# Patient Record
Sex: MALE | Race: WHITE | NOT HISPANIC OR LATINO | URBAN - METROPOLITAN AREA
[De-identification: names, ages, dates, MRNs, and addresses within clinical notes are randomized per-mention and may not be internally consistent; named-entity substitution may affect disease eponyms.]

---

## 2023-01-29 PROBLEM — E11.69 HYPERLIPIDEMIA ASSOCIATED WITH TYPE 2 DIABETES MELLITUS (MULTI): Status: ACTIVE | Noted: 2023-01-29

## 2023-01-29 PROBLEM — I50.30 DIASTOLIC CHF (MULTI): Status: ACTIVE | Noted: 2023-01-29

## 2023-01-29 PROBLEM — J02.9 PHARYNGITIS: Status: ACTIVE | Noted: 2023-01-29

## 2023-01-29 PROBLEM — R05.9 COUGH: Status: ACTIVE | Noted: 2023-01-29

## 2023-01-29 PROBLEM — J45.909 ASTHMA (HHS-HCC): Status: ACTIVE | Noted: 2023-01-29

## 2023-01-29 PROBLEM — K52.9 COLITIS: Status: ACTIVE | Noted: 2023-01-29

## 2023-01-29 PROBLEM — R10.9 UNSPECIFIED ABDOMINAL PAIN: Status: ACTIVE | Noted: 2023-01-29

## 2023-01-29 PROBLEM — I10 HYPERTENSION: Status: ACTIVE | Noted: 2023-01-29

## 2023-01-29 PROBLEM — Z95.5 PRESENCE OF STENT IN CORONARY ARTERY: Status: ACTIVE | Noted: 2023-01-29

## 2023-01-29 PROBLEM — D50.8: Status: ACTIVE | Noted: 2023-01-29

## 2023-01-29 PROBLEM — E78.00 PURE HYPERCHOLESTEROLEMIA: Status: ACTIVE | Noted: 2023-01-29

## 2023-01-29 PROBLEM — I31.39 PERICARDIAL EFFUSION (HHS-HCC): Status: ACTIVE | Noted: 2023-01-29

## 2023-01-29 PROBLEM — F41.9 ANXIETY DISORDER: Status: ACTIVE | Noted: 2023-01-29

## 2023-01-29 PROBLEM — I25.10 ATHSCL HEART DISEASE OF NATIVE CORONARY ARTERY W/O ANG PCTRS: Status: ACTIVE | Noted: 2023-01-29

## 2023-01-29 PROBLEM — R09.1 PLEURISY: Status: ACTIVE | Noted: 2023-01-29

## 2023-01-29 PROBLEM — I31.9 PERICARDITIS (HHS-HCC): Status: ACTIVE | Noted: 2023-01-29

## 2023-01-29 PROBLEM — E78.5 HYPERLIPIDEMIA ASSOCIATED WITH TYPE 2 DIABETES MELLITUS (MULTI): Status: ACTIVE | Noted: 2023-01-29

## 2023-01-29 PROBLEM — E11.9 DIABETES (MULTI): Status: ACTIVE | Noted: 2023-01-29

## 2023-01-29 PROBLEM — J31.0 RHINITIS: Status: ACTIVE | Noted: 2023-01-29

## 2023-01-29 PROBLEM — I35.0 AS (AORTIC STENOSIS): Status: ACTIVE | Noted: 2023-01-29

## 2023-01-29 PROBLEM — E03.9 HYPOTHYROID: Status: ACTIVE | Noted: 2023-01-29

## 2023-01-29 PROBLEM — I73.9 PVD (PERIPHERAL VASCULAR DISEASE) (CMS-HCC): Status: ACTIVE | Noted: 2023-01-29

## 2023-01-29 RX ORDER — ATORVASTATIN CALCIUM 40 MG/1
1 TABLET, FILM COATED ORAL DAILY
COMMUNITY
Start: 2018-09-19 | End: 2023-04-26 | Stop reason: ALTCHOICE

## 2023-01-29 RX ORDER — LYSINE HCL 500 MG
TABLET ORAL
COMMUNITY
Start: 2019-12-02

## 2023-01-29 RX ORDER — AMLODIPINE BESYLATE 10 MG/1
1 TABLET ORAL DAILY
COMMUNITY
Start: 2018-09-19 | End: 2023-04-26 | Stop reason: SDUPTHER

## 2023-01-29 RX ORDER — HYDROCHLOROTHIAZIDE 25 MG/1
1 TABLET ORAL DAILY
COMMUNITY
Start: 2022-02-04 | End: 2023-04-26 | Stop reason: SDUPTHER

## 2023-01-29 RX ORDER — POTASSIUM CHLORIDE 1500 MG/1
TABLET, EXTENDED RELEASE ORAL
COMMUNITY

## 2023-01-29 RX ORDER — LOSARTAN POTASSIUM 50 MG/1
1 TABLET ORAL DAILY
COMMUNITY
Start: 2022-02-04

## 2023-01-29 RX ORDER — PSYLLIUM HUSK 3.4 G/12G
POWDER ORAL
COMMUNITY
Start: 2019-12-02 | End: 2023-10-09 | Stop reason: WASHOUT

## 2023-01-29 RX ORDER — NICOTINE POLACRILEX 2 MG
GUM BUCCAL
COMMUNITY
Start: 2019-12-02

## 2023-01-29 RX ORDER — MV-MN/OM3/DHA/EPA/FISH/LUT/ZEA 250-5-1 MG
CAPSULE ORAL
COMMUNITY
Start: 2019-12-02 | End: 2023-10-09 | Stop reason: WASHOUT

## 2023-01-29 RX ORDER — FAMOTIDINE 40 MG/1
1 TABLET, FILM COATED ORAL 2 TIMES DAILY
COMMUNITY
End: 2023-04-12

## 2023-01-29 RX ORDER — FINASTERIDE 5 MG/1
1 TABLET, FILM COATED ORAL DAILY
COMMUNITY
Start: 2020-10-13 | End: 2023-09-13 | Stop reason: SDUPTHER

## 2023-01-29 RX ORDER — GLUCOSAMINE/D3/BOSWELLIA SERRA 1500MG-400
TABLET ORAL
COMMUNITY
Start: 2019-12-02 | End: 2023-10-09 | Stop reason: WASHOUT

## 2023-01-29 RX ORDER — LEVOTHYROXINE SODIUM 50 UG/1
TABLET ORAL
COMMUNITY
End: 2023-10-09 | Stop reason: WASHOUT

## 2023-01-29 RX ORDER — COLCHICINE 0.6 MG/1
0.6 TABLET ORAL 2 TIMES DAILY
COMMUNITY
Start: 2022-09-16 | End: 2024-04-11 | Stop reason: WASHOUT

## 2023-01-29 RX ORDER — ASPIRIN 81 MG/1
1 TABLET ORAL DAILY
COMMUNITY

## 2023-01-29 RX ORDER — METOPROLOL TARTRATE 25 MG/1
1 TABLET, FILM COATED ORAL 2 TIMES DAILY
COMMUNITY
Start: 2013-12-19

## 2023-01-29 RX ORDER — PNV NO.95/FERROUS FUM/FOLIC AC 28MG-0.8MG
TABLET ORAL
COMMUNITY
Start: 2019-12-02

## 2023-01-29 RX ORDER — NITROGLYCERIN 0.4 MG/1
1 TABLET SUBLINGUAL
COMMUNITY
End: 2023-10-09 | Stop reason: SDUPTHER

## 2023-01-29 RX ORDER — ACETAMINOPHEN 500 MG
TABLET ORAL
COMMUNITY
Start: 2019-12-02 | End: 2023-10-09 | Stop reason: WASHOUT

## 2023-01-29 RX ORDER — DIPHENOXYLATE HYDROCHLORIDE AND ATROPINE SULFATE 2.5; .025 MG/1; MG/1
1 TABLET ORAL DAILY
COMMUNITY
Start: 2021-04-22 | End: 2023-03-13 | Stop reason: SDUPTHER

## 2023-01-29 RX ORDER — L.ACID,FERM,PLA,RHA/B.BIF,LONG 126 MG
TABLET, DELAYED AND EXTENDED RELEASE ORAL
COMMUNITY
Start: 2019-12-02 | End: 2023-10-09 | Stop reason: WASHOUT

## 2023-01-29 RX ORDER — GLIMEPIRIDE 1 MG/1
TABLET ORAL
COMMUNITY
Start: 2013-11-22

## 2023-03-13 ENCOUNTER — APPOINTMENT (OUTPATIENT)
Dept: LAB | Facility: LAB | Age: 83
End: 2023-03-13
Payer: MEDICARE

## 2023-03-13 ENCOUNTER — OFFICE VISIT (OUTPATIENT)
Dept: PRIMARY CARE | Facility: CLINIC | Age: 83
End: 2023-03-13
Payer: MEDICARE

## 2023-03-13 VITALS
DIASTOLIC BLOOD PRESSURE: 66 MMHG | HEART RATE: 74 BPM | BODY MASS INDEX: 29.4 KG/M2 | WEIGHT: 194 LBS | HEIGHT: 68 IN | SYSTOLIC BLOOD PRESSURE: 118 MMHG | OXYGEN SATURATION: 98 %

## 2023-03-13 DIAGNOSIS — I31.39 PERICARDIAL EFFUSION (HHS-HCC): Primary | ICD-10-CM

## 2023-03-13 DIAGNOSIS — E11.69 HYPERLIPIDEMIA ASSOCIATED WITH TYPE 2 DIABETES MELLITUS (MULTI): ICD-10-CM

## 2023-03-13 DIAGNOSIS — I73.9 PVD (PERIPHERAL VASCULAR DISEASE) (CMS-HCC): ICD-10-CM

## 2023-03-13 DIAGNOSIS — E78.5 HYPERLIPIDEMIA ASSOCIATED WITH TYPE 2 DIABETES MELLITUS (MULTI): ICD-10-CM

## 2023-03-13 DIAGNOSIS — I15.9 SECONDARY HYPERTENSION: ICD-10-CM

## 2023-03-13 DIAGNOSIS — I50.30 DIASTOLIC CONGESTIVE HEART FAILURE, UNSPECIFIED HF CHRONICITY (MULTI): ICD-10-CM

## 2023-03-13 DIAGNOSIS — K52.832 LYMPHOCYTIC COLITIS: ICD-10-CM

## 2023-03-13 DIAGNOSIS — E03.9 HYPOTHYROIDISM, UNSPECIFIED TYPE: ICD-10-CM

## 2023-03-13 PROBLEM — R05.9 COUGH: Status: RESOLVED | Noted: 2023-01-29 | Resolved: 2023-03-13

## 2023-03-13 PROBLEM — J45.909 ASTHMA (HHS-HCC): Status: RESOLVED | Noted: 2023-01-29 | Resolved: 2023-03-13

## 2023-03-13 PROBLEM — R09.1 PLEURISY: Status: RESOLVED | Noted: 2023-01-29 | Resolved: 2023-03-13

## 2023-03-13 PROCEDURE — 1036F TOBACCO NON-USER: CPT | Performed by: INTERNAL MEDICINE

## 2023-03-13 PROCEDURE — 3078F DIAST BP <80 MM HG: CPT | Performed by: INTERNAL MEDICINE

## 2023-03-13 PROCEDURE — 3074F SYST BP LT 130 MM HG: CPT | Performed by: INTERNAL MEDICINE

## 2023-03-13 PROCEDURE — 99214 OFFICE O/P EST MOD 30 MIN: CPT | Performed by: INTERNAL MEDICINE

## 2023-03-13 RX ORDER — DIPHENOXYLATE HYDROCHLORIDE AND ATROPINE SULFATE 2.5; .025 MG/1; MG/1
1 TABLET ORAL DAILY
Qty: 30 TABLET | Refills: 2 | Status: SHIPPED | OUTPATIENT
Start: 2023-03-13 | End: 2023-06-13 | Stop reason: SDUPTHER

## 2023-03-13 ASSESSMENT — PATIENT HEALTH QUESTIONNAIRE - PHQ9
1. LITTLE INTEREST OR PLEASURE IN DOING THINGS: NOT AT ALL
2. FEELING DOWN, DEPRESSED OR HOPELESS: NOT AT ALL
SUM OF ALL RESPONSES TO PHQ9 QUESTIONS 1 AND 2: 0

## 2023-03-13 ASSESSMENT — ENCOUNTER SYMPTOMS
OCCASIONAL FEELINGS OF UNSTEADINESS: 0
LOSS OF SENSATION IN FEET: 0
DEPRESSION: 0

## 2023-03-13 NOTE — PROGRESS NOTES
Subjective   Patient ID: Curtis Brown is a 82 y.o. male who presents for the following    MEDICARE WELLNESS 10/26/2022     Assessment/Plan   pericardial effusion: Patient no longer on colchicine and has follow up with dr montes      htn: stable, continue metoprolol. slightly elevated but will check at home. improved, continue on hctz to 25mg daily      hld: stable, continue statin. LDL < 70     dm2: very controlled, discussed treatment of low blood sugars     PVD: stable, continue statin and aspirin      diastolic CHF and severe aortic stenosis: stable, patient is very active. stable. getting echo with dr montes soon      lymphocytic colitis diarrhea: stable on diphenoxylate. increase #34         patient does not have children. needs to consider POA outs     I discussed patient's OARRS report as well as the potential concern for overdose on prescribed opioid, sleep aid, gabapentin/Lyrica, and/or benzodiazepines. I explained that Overdose Risk Scores >460, require a Narcan prescription and places the patient at risk for potential death.      Patient understands that the risk of death can occur when using opioid, benzodiazepines, gabapentin, Lyrica, sleep aids, and illegal drugs. The risk of death especially increases when using the above medications and or illegal drugs in combination. I have reviewed with the patient and the patient is in agreement with the terms of the  Controlled Substance Agreement.      At this point in time, patient is in compliance with the above, and has no signs of dependence or addiction to the above prescribed medications.    HPI   male cad s/p stent 11/2013, PVD, htn, hld comes for the following       HX PERICARDITIS AND PERICARDIAL EFFUSION sEPTEMBER 2022: this has since resolved. and he follows with cardiology regularly for this     lymphocytic colitis (biopsy + May 18, 2021)diarrhea: patient is on diphenoxylate/atropine for his diarrhea on a regular basis. he was seen by   Brain (who has just recently retired). he is controlled on dipheoxylate-atropine. he does on occasion during the month require an additional dose. patient continues to do well on his current management      diastolic chf: Patient denies any chest pain, angina or exertional dyspnea. patient denies any swelling to lower extremities. Patient follows with cardiology on a regular basis     echo from feb 2020 ef 60% with moderate to severe aortic valve stenosis and mild tr.      Diabetes Type 2: patient denies any low blood sugars. Patient is following with opthalmology on a yearly basis. Patient is taking glimepiride a1c 5s typically. patient following with dr christina.      hypothyroid: patient denies any hypo or hypothyroid symptoms. patient denies any palpitations, diarrhea or constipation     HLD: Patient denies any muscle aches and is tolerating statin therapy     pvd: denies any previous strokes, no headaches. no claudication     spinal stenosis: pain intermittent, in gluteal region     social: patient denies any smoking, drug or alcohol abuse     surgical history: left hip replacement 11/2017     COLONOSCOPY MAY 2021 AT [facility] with 5 year follow up      HOSPITALIZATIONS   September 3 to September 7, 2022 @[facility] . Patient came in originally to [facility] for shortness of breath and chest pain. Patient was found to have acute pericarditis along with moderate-sized pericardial effusion. He was placed on colchicine twice a day and his symptoms dramatically improved. Cardiology as well as endocrinology was following him and he was discharged on colchicine.     OARRS:  Baudilio Gonzalez,  on 3/13/2023  3:45 PM  I have personally reviewed the OARRS report for Curtis Brown. I have considered the risks of abuse, dependence, addiction and diversion    Is the patient prescribed a combination of a benzodiazepine and opioid?  Yes, I feel it is clincially indicated to  continue the medication and have discussed with the patient risks/benefits/alternatives.    Last Urine Drug Screen / ordered today: Yes  Recent Results (from the past [id number] hour(s))   OPIATE/OPIOID/BENZO PRESCRIPTION COMPLIANCE    Collection Time: 03/09/22 11:03 AM   Result Value Ref Range    DRUG SCREEN COMMENT URINE SEE BELOW     Creatine, Urine 135.3 mg/dL    Amphetamine Screen, Urine PRESUMPTIVE NEGATIVE NEGATIVE    Barbiturate Screen, Urine PRESUMPTIVE NEGATIVE NEGATIVE    Cannabinoid Screen, Urine PRESUMPTIVE NEGATIVE NEGATIVE    Cocaine Screen, Urine PRESUMPTIVE NEGATIVE NEGATIVE    PCP Screen, Urine PRESUMPTIVE NEGATIVE NEGATIVE    7-Aminoclonazepam <25 Cutoff <25 ng/mL    Alpha-Hydroxyalprazolam <25 Cutoff <25 ng/mL    Alpha-Hydroxymidazolam <25 Cutoff <25 ng/mL    Alprazolam <25 Cutoff <25 ng/mL    Chlordiazepoxide <25 Cutoff <25 ng/mL    Clonazepam <25 Cutoff <25 ng/mL    Diazepam <25 Cutoff <25 ng/mL    Lorazepam <25 Cutoff <25 ng/mL    Midazolam <25 Cutoff <25 ng/mL    Nordiazepam <25 Cutoff <25 ng/mL    Oxazepam <25 Cutoff <25 ng/mL    Temazepam <25 Cutoff <25 ng/mL    Zolpidem <25 Cutoff <25 ng/mL    Zolpidem Metabolite (ZCA) <25 Cutoff <25 ng/mL    6-Acetylmorphine <25 Cutoff <25 ng/mL    Codeine <50 Cutoff <50 ng/mL    Hydrocodone <25 Cutoff <25 ng/mL    Hydromorphone <25 Cutoff <25 ng/mL    Morphine Urine <50 Cutoff <50 ng/mL    Norhydrocodone <25 Cutoff <25 ng/mL    Noroxycodone <25 Cutoff <25 ng/mL    Oxycodone <25 Cutoff <25 ng/mL    Oxymorphone <25 Cutoff <25 ng/mL    Tramadol <50 Cutoff <50 ng/mL    O-Desmethyltramadol <50 Cutoff <50 ng/mL    Fentanyl <2.5 Cutoff<2.5 ng/mL    Norfentanyl <2.5 Cutoff<2.5 ng/mL    METHADONE CONFIRMATION,URINE <25 Cutoff <25 ng/mL    EDDP <25 Cutoff <25 ng/mL     Results are as expected.     Controlled Substance Agreement:  Date of the Last Agreement:   3/13/2023     Reviewed Controlled Substance Agreement including but not limited to the benefits, risks, and  "alternatives to treatment with a Controlled Substance medication(s).    Antidiarrheal:   What is the patient's goal of therapy?  Improve diarrhea   Is this being achieved with current treatment? yes  Is the patient currently prescribed an opioid, and/or benzodiazepine?   Yes, I feel it is clincially indicated to continue the medication and have discussed with the patient risks/benefits/alternatives.    Activities of Daily Living:   Is your overall impression that this patient is benefiting (symptom reduction outweighs side effects) from antidiarrheal therapy? No     1. Physical Functioning: Better  2. Family Relationship: Better  3. Social Relationship: Better  4. Mood: Better  5. Sleep Patterns: Better  6. Overall Function: Better      Visit Vitals  /66   Pulse 74   Ht 1.727 m (5' 8\")   Wt 88 kg (194 lb)   SpO2 98%   BMI 29.50 kg/m²   Smoking Status Never   BSA 2.05 m²     PHYSICAL EXAM       REVIEW OF SYSTEMS       Allergies   Allergen Reactions    Penicillins Unknown       Current Outpatient Medications   Medication Sig Dispense Refill    amLODIPine (Norvasc) 10 mg tablet Take 1 tablet (10 mg) by mouth 1 (one) time each day.      ascorbic acid/bioflavonoids (AYO-C PO) Take by mouth.      aspirin 81 mg EC tablet Take 1 tablet (81 mg) by mouth 1 (one) time each day.      atorvastatin (Lipitor) 40 mg tablet Take 1 tablet (40 mg) by mouth 1 (one) time each day.      biotin 1 mg capsule Take by mouth.      calcium carbonate-vit D3-min 600 mg calcium- 400 unit tablet Take by mouth.      cholecalciferol (Vitamin D-3) 50 mcg (2,000 unit) capsule Take by mouth.      coenzyme Q-10 300 mg capsule capsule Take by mouth.      colchicine 0.6 mg tablet Take 1 tablet (0.6 mg) by mouth in the morning and 1 tablet (0.6 mg) in the evening.      cyanocobalamin (Vitamin B-12) 100 mcg tablet Take by mouth.      diphenoxylate-atropine (Lomotil) 2.5-0.025 mg tablet Take 1 tablet by mouth 1 (one) time each day. With additional " tablet in evening if needed      famotidine (Pepcid) 40 mg tablet Take 1 tablet (40 mg) by mouth in the morning and at bedtime.      finasteride (Proscar) 5 mg tablet Take 1 tablet (5 mg) by mouth 1 (one) time each day.      glimepiride (Amaryl) 1 mg tablet Take by mouth.      glucosamine-D3-Boswellia serr (Osteo Bi-Flex, 5-Loxin,) 1,500-400-100 mg-unit-mg tablet Take by mouth.      hydroCHLOROthiazide (HYDRODiuril) 25 mg tablet Take 1 tablet (25 mg) by mouth 1 (one) time each day.      L.acid,ferm,aleksander,rha-B.bif,long (Controlled Delivery Probiotic) 126 mg (2 billion cell) tablet,delayed and ext.release Take by mouth.      levothyroxine (Synthroid, Levoxyl) 50 mcg tablet Take by mouth.      losartan (Cozaar) 50 mg tablet Take 1 tablet (50 mg) by mouth 1 (one) time each day.      metoprolol tartrate (Lopressor) 25 mg tablet Take 1 tablet (25 mg) by mouth in the morning and at bedtime.      multivit-min/folic/vit K/lycop (ONE-A-DAY MEN'S MULTIVITAMIN ORAL) Take by mouth. VitaCraves Oral Tablet Chewable      hy-xk-gj2-dha-epa-fish-lut-livan (Ocuvite Adult 50 Plus) 250 mg (90 mg-160 mg) capsule Take by mouth.      nitroglycerin (Nitrostat) 0.4 mg SL tablet Place 1 tablet (0.4 mg) under the tongue. Under the tongue as needed for chest pain      potassium chloride CR (K-Tab) 20 mEq ER tablet Take by mouth.      psyllium (Metamucil, with sugar,) 3.4 gram packet Take by mouth.       No current facility-administered medications for this visit.       Objective     No visits with results within 4 Month(s) from this visit.   Latest known visit with results is:   Legacy Encounter on 03/09/2022   Component Date Value Ref Range Status    DRUG SCREEN COMMENT URINE 03/09/2022 SEE BELOW   Final    Creatine, Urine 03/09/2022 135.3  mg/dL Final    Amphetamine Screen, Urine 03/09/2022 PRESUMPTIVE NEGATIVE  NEGATIVE Final    Barbiturate Screen, Urine 03/09/2022 PRESUMPTIVE NEGATIVE  NEGATIVE Final    Cannabinoid Screen, Urine 03/09/2022  PRESUMPTIVE NEGATIVE  NEGATIVE Final    Cocaine Screen, Urine 03/09/2022 PRESUMPTIVE NEGATIVE  NEGATIVE Final    PCP Screen, Urine 03/09/2022 PRESUMPTIVE NEGATIVE  NEGATIVE Final    7-Aminoclonazepam 03/09/2022 <25  Cutoff <25 ng/mL Final    Alpha-Hydroxyalprazolam 03/09/2022 <25  Cutoff <25 ng/mL Final    Alpha-Hydroxymidazolam 03/09/2022 <25  Cutoff <25 ng/mL Final    Alprazolam 03/09/2022 <25  Cutoff <25 ng/mL Final    Chlordiazepoxide 03/09/2022 <25  Cutoff <25 ng/mL Final    Clonazepam 03/09/2022 <25  Cutoff <25 ng/mL Final    Diazepam 03/09/2022 <25  Cutoff <25 ng/mL Final    Lorazepam 03/09/2022 <25  Cutoff <25 ng/mL Final    Midazolam 03/09/2022 <25  Cutoff <25 ng/mL Final    Nordiazepam 03/09/2022 <25  Cutoff <25 ng/mL Final    Oxazepam 03/09/2022 <25  Cutoff <25 ng/mL Final    Temazepam 03/09/2022 <25  Cutoff <25 ng/mL Final    Zolpidem 03/09/2022 <25  Cutoff <25 ng/mL Final    Zolpidem Metabolite (ZCA) 03/09/2022 <25  Cutoff <25 ng/mL Final    6-Acetylmorphine 03/09/2022 <25  Cutoff <25 ng/mL Final    Codeine 03/09/2022 <50  Cutoff <50 ng/mL Final    Hydrocodone 03/09/2022 <25  Cutoff <25 ng/mL Final    Hydromorphone 03/09/2022 <25  Cutoff <25 ng/mL Final    Morphine Urine 03/09/2022 <50  Cutoff <50 ng/mL Final    Norhydrocodone 03/09/2022 <25  Cutoff <25 ng/mL Final    Noroxycodone 03/09/2022 <25  Cutoff <25 ng/mL Final    Oxycodone 03/09/2022 <25  Cutoff <25 ng/mL Final    Oxymorphone 03/09/2022 <25  Cutoff <25 ng/mL Final    Tramadol 03/09/2022 <50  Cutoff <50 ng/mL Final    O-Desmethyltramadol 03/09/2022 <50  Cutoff <50 ng/mL Final    Fentanyl 03/09/2022 <2.5  Cutoff<2.5 ng/mL Final    Norfentanyl 03/09/2022 <2.5  Cutoff<2.5 ng/mL Final    METHADONE CONFIRMATION,URINE 03/09/2022 <25  Cutoff <25 ng/mL Final    EDDP 03/09/2022 <25  Cutoff <25 ng/mL Final       Radiology: Reviewed imaging in powerchart.  No results found.    Family History   Problem Relation Name Age of Onset    Stroke Mother      Heart  failure Mother      Other (hepatic cirrhosis) Father      Parkinsonism Brother       Social History     Socioeconomic History    Marital status:      Spouse name: None    Number of children: None    Years of education: None    Highest education level: None   Occupational History    None   Tobacco Use    Smoking status: Never    Smokeless tobacco: Never   Vaping Use    Vaping status: None   Substance and Sexual Activity    Alcohol use: Never    Drug use: Never    Sexual activity: None   Other Topics Concern    None   Social History Narrative    None     Social Determinants of Health     Financial Resource Strain: Not on file   Food Insecurity: Not on file   Transportation Needs: Not on file   Physical Activity: Not on file   Stress: Not on file   Social Connections: Not on file   Intimate Partner Violence: Not on file   Housing Stability: Not on file     Past Medical History:   Diagnosis Date    Abnormal result of other cardiovascular function study     Abnormal nuclear stress test    Acute ischemic heart disease, unspecified (CMS/HCC)     ACS (acute coronary syndrome)    Atherosclerotic heart disease of native coronary artery without angina pectoris     Arteriosclerotic coronary artery disease    Chronic sinusitis, unspecified     Chronic congestion of paranasal sinus    Coronary angioplasty status     History of percutaneous coronary intervention    Encounter for preprocedural cardiovascular examination     Pre-operative cardiovascular examination    Encounter for screening for malignant neoplasm of prostate     Screening PSA (prostate specific antigen)    Lyme disease, unspecified 12/02/2013    Lyme disease    Other cervical disc displacement, unspecified cervical region     Herniated cervical disc without myelopathy    Other specified postprocedural states     History of carpal tunnel surgery    Other systemic sclerosis (CMS/HCC)     Cutaneous scleroderma    Personal history of malignant neoplasm,  unspecified     History of malignant neoplasm    Personal history of other diseases of the circulatory system     History of hypertension    Personal history of other diseases of the circulatory system     History of abnormal electrocardiography    Personal history of other diseases of the digestive system     History of gastroesophageal reflux (GERD)    Personal history of other diseases of the musculoskeletal system and connective tissue     History of low back pain    Personal history of other diseases of the musculoskeletal system and connective tissue     History of spinal stenosis    Personal history of other diseases of the musculoskeletal system and connective tissue     History of arthritis    Personal history of other endocrine, nutritional and metabolic disease     History of hypothyroidism    Personal history of other endocrine, nutritional and metabolic disease     History of hyperlipidemia    Personal history of other endocrine, nutritional and metabolic disease     History of type 2 diabetes mellitus    Personal history of other endocrine, nutritional and metabolic disease     History of hypoglycemia    Personal history of other specified conditions     History of chest pain    Polyp of stomach and duodenum     Gastric polyp     Past Surgical History:   Procedure Laterality Date    CORONARY ANGIOPLASTY WITH STENT PLACEMENT  07/27/2018    Cath Placement Of Stent 1    HAND SURGERY  07/27/2018    Hand Surgery                                                                                                                                                          OTHER SURGICAL HISTORY  07/27/2018    Surgery    OTHER SURGICAL HISTORY  07/27/2018    Total Hip Replacement Left       Charting was completed using voice recognition technology and may include unintended errors.

## 2023-03-16 LAB
6-ACETYLMORPHINE: <25 NG/ML
7-AMINOCLONAZEPAM: <25 NG/ML
ALPHA-HYDROXYALPRAZOLAM: <25 NG/ML
ALPHA-HYDROXYMIDAZOLAM: <25 NG/ML
ALPRAZOLAM: <25 NG/ML
AMPHETAMINE (PRESENCE) IN URINE BY SCREEN METHOD: NORMAL
BARBITURATES PRESENCE IN URINE BY SCREEN METHOD: NORMAL
CANNABINOIDS IN URINE BY SCREEN METHOD: NORMAL
CHLORDIAZEPOXIDE: <25 NG/ML
CLONAZEPAM: <25 NG/ML
COCAINE (PRESENCE) IN URINE BY SCREEN METHOD: NORMAL
CODEINE: <50 NG/ML
CREATINE, URINE FOR DRUG: 127.8 MG/DL
DIAZEPAM: <25 NG/ML
DRUG SCREEN COMMENT URINE: NORMAL
EDDP: <25 NG/ML
FENTANYL CONFIRMATION, URINE: <2.5 NG/ML
HYDROCODONE: <25 NG/ML
HYDROMORPHONE: <25 NG/ML
LORAZEPAM: <25 NG/ML
METHADONE CONFIRMATION,URINE: <25 NG/ML
MIDAZOLAM: <25 NG/ML
MORPHINE URINE: <50 NG/ML
NORDIAZEPAM: <25 NG/ML
NORFENTANYL: <2.5 NG/ML
NORHYDROCODONE: <25 NG/ML
NOROXYCODONE: <25 NG/ML
O-DESMETHYLTRAMADOL: <50 NG/ML
OXAZEPAM: <25 NG/ML
OXYCODONE: <25 NG/ML
OXYMORPHONE: <25 NG/ML
PHENCYCLIDINE (PRESENCE) IN URINE BY SCREEN METHOD: NORMAL
TEMAZEPAM: <25 NG/ML
TRAMADOL: <50 NG/ML
ZOLPIDEM METABOLITE (ZCA): <25 NG/ML
ZOLPIDEM: <25 NG/ML

## 2023-04-12 DIAGNOSIS — K21.9 GASTROESOPHAGEAL REFLUX DISEASE, UNSPECIFIED WHETHER ESOPHAGITIS PRESENT: ICD-10-CM

## 2023-04-12 RX ORDER — FAMOTIDINE 40 MG/1
TABLET, FILM COATED ORAL
Qty: 180 TABLET | Refills: 3 | Status: SHIPPED | OUTPATIENT
Start: 2023-04-12 | End: 2023-04-26 | Stop reason: SDUPTHER

## 2023-04-26 ENCOUNTER — OFFICE VISIT (OUTPATIENT)
Dept: PRIMARY CARE | Facility: CLINIC | Age: 83
End: 2023-04-26
Payer: MEDICARE

## 2023-04-26 VITALS
OXYGEN SATURATION: 96 % | WEIGHT: 193.6 LBS | SYSTOLIC BLOOD PRESSURE: 126 MMHG | BODY MASS INDEX: 29.44 KG/M2 | HEART RATE: 63 BPM | DIASTOLIC BLOOD PRESSURE: 76 MMHG

## 2023-04-26 DIAGNOSIS — E78.5 HYPERLIPIDEMIA ASSOCIATED WITH TYPE 2 DIABETES MELLITUS (MULTI): ICD-10-CM

## 2023-04-26 DIAGNOSIS — K21.9 GASTROESOPHAGEAL REFLUX DISEASE, UNSPECIFIED WHETHER ESOPHAGITIS PRESENT: ICD-10-CM

## 2023-04-26 DIAGNOSIS — I15.9 SECONDARY HYPERTENSION: Primary | ICD-10-CM

## 2023-04-26 DIAGNOSIS — E11.69 HYPERLIPIDEMIA ASSOCIATED WITH TYPE 2 DIABETES MELLITUS (MULTI): ICD-10-CM

## 2023-04-26 PROCEDURE — 99214 OFFICE O/P EST MOD 30 MIN: CPT | Performed by: INTERNAL MEDICINE

## 2023-04-26 PROCEDURE — 3074F SYST BP LT 130 MM HG: CPT | Performed by: INTERNAL MEDICINE

## 2023-04-26 PROCEDURE — 3078F DIAST BP <80 MM HG: CPT | Performed by: INTERNAL MEDICINE

## 2023-04-26 PROCEDURE — 1036F TOBACCO NON-USER: CPT | Performed by: INTERNAL MEDICINE

## 2023-04-26 RX ORDER — HYDROCHLOROTHIAZIDE 25 MG/1
25 TABLET ORAL DAILY
Qty: 90 TABLET | Refills: 3 | Status: SHIPPED | OUTPATIENT
Start: 2023-04-26 | End: 2023-10-25 | Stop reason: SDUPTHER

## 2023-04-26 RX ORDER — FAMOTIDINE 40 MG/1
40 TABLET, FILM COATED ORAL 2 TIMES DAILY
Qty: 180 TABLET | Refills: 3 | Status: SHIPPED | OUTPATIENT
Start: 2023-04-26 | End: 2023-10-25 | Stop reason: SDUPTHER

## 2023-04-26 RX ORDER — AMLODIPINE BESYLATE 10 MG/1
10 TABLET ORAL DAILY
Qty: 90 TABLET | Refills: 3 | Status: SHIPPED | OUTPATIENT
Start: 2023-04-26 | End: 2023-10-25 | Stop reason: SDUPTHER

## 2023-04-26 RX ORDER — ROSUVASTATIN CALCIUM 20 MG/1
20 TABLET, COATED ORAL DAILY
Qty: 90 TABLET | Refills: 3 | Status: SHIPPED | OUTPATIENT
Start: 2023-04-26 | End: 2023-04-26 | Stop reason: SDUPTHER

## 2023-04-26 RX ORDER — FAMOTIDINE 40 MG/1
40 TABLET, FILM COATED ORAL 2 TIMES DAILY
Qty: 180 TABLET | Refills: 3 | Status: SHIPPED | OUTPATIENT
Start: 2023-04-26 | End: 2023-04-26 | Stop reason: SDUPTHER

## 2023-04-26 RX ORDER — ROSUVASTATIN CALCIUM 20 MG/1
20 TABLET, COATED ORAL DAILY
Qty: 90 TABLET | Refills: 3 | Status: SHIPPED | OUTPATIENT
Start: 2023-04-26 | End: 2023-10-09 | Stop reason: WASHOUT

## 2023-04-26 RX ORDER — AMLODIPINE BESYLATE 10 MG/1
10 TABLET ORAL DAILY
Qty: 90 TABLET | Refills: 3 | Status: SHIPPED | OUTPATIENT
Start: 2023-04-26 | End: 2023-04-26 | Stop reason: SDUPTHER

## 2023-04-26 RX ORDER — NAPROXEN SODIUM 220 MG/1
TABLET ORAL
COMMUNITY
End: 2023-10-09 | Stop reason: WASHOUT

## 2023-04-26 RX ORDER — HYDROCHLOROTHIAZIDE 25 MG/1
25 TABLET ORAL DAILY
Qty: 90 TABLET | Refills: 3 | Status: SHIPPED | OUTPATIENT
Start: 2023-04-26 | End: 2023-04-26 | Stop reason: SDUPTHER

## 2023-04-26 NOTE — PROGRESS NOTES
Subjective   Patient ID: Curtis Brown is a 82 y.o. male who presents for the following    MEDICARE WELLNESS 10/26/2022     Assessment/Plan   pericardial effusion: Patient no longer on colchicine and has follow up with dr montes      htn: stable, continue metoprolol. slightly elevated but will check at home. improved, continue on hctz to 25mg daily , amlodipine 10mg daily.      hld: stable, continue statin. LDL < 70. Patient wants to change Lipitor to Crestor per patient's request. (Patient tolerating medication at this time, but prefers to try crestor)      dm2: very controlled, discussed treatment of low blood sugars. Patient follows with dr christina     PVD: stable, continue statin and aspirin      diastolic CHF and severe aortic stenosis: stable, patient is very active. stable. getting echo with dr montes soon      lymphocytic colitis diarrhea: stable on diphenoxylate. increase #34       patient does not have children. needs to consider POA outs         HPI   male cad s/p stent 11/2013, PVD, htn, hld comes for the following       HX PERICARDITIS AND PERICARDIAL EFFUSION sEPTEMBER 2022: this has since resolved. and he follows with cardiology regularly for this     lymphocytic colitis (biopsy + May 18, 2021)diarrhea: patient is on diphenoxylate/atropine for his diarrhea on a regular basis. he was seen by Dr De La Paz (who has just recently retired). he is controlled on dipheoxylate-atropine. he does on occasion during the month require an additional dose. patient continues to do well on his current management      diastolic chf: Patient denies any chest pain, angina or exertional dyspnea. patient denies any swelling to lower extremities. Patient follows with cardiology on a regular basis     echo from feb 2020 ef 60% with moderate to severe aortic valve stenosis and mild tr.      Diabetes Type 2: patient denies any low blood sugars. Patient is following with opthalmology on a yearly basis. Patient is taking  glimepiride a1c 5s typically. patient following with dr christina.      hypothyroid: patient denies any hypo or hypothyroid symptoms. patient denies any palpitations, diarrhea or constipation     HLD: Patient denies any muscle aches and is tolerating statin therapy     pvd: denies any previous strokes, no headaches. no claudication     spinal stenosis: pain intermittent, in gluteal region     social: patient denies any smoking, drug or alcohol abuse     surgical history: left hip replacement 11/2017     [facility]COPY MAY 2021 [facility] with 5 year follow up      HOSPITALIZATIONS   September 3 to September 7, 2022 @[facility] . Patient came in originally to [facility] for shortness of breath and chest pain. Patient was found to have acute pericarditis along with moderate-sized pericardial effusion. He was placed on colchicine twice a day and his symptoms dramatically improved. Cardiology as well as endocrinology was following him and he was discharged on colchicine.     OARRS:  No data recorded  I have personally reviewed the OARRS report for Curtis Brown. I have considered the risks of abuse, dependence, addiction and diversion    Is the patient prescribed a combination of a benzodiazepine and opioid?  Yes, I feel it is clincially indicated to continue the medication and have discussed with the patient risks/benefits/alternatives.    Last Urine Drug Screen / ordered today: Yes  Recent Results (from the past [id number] hour(s))   OPIATE/OPIOID/BENZO PRESCRIPTION COMPLIANCE    Collection Time: 03/13/23  4:02 PM   Result Value Ref Range    DRUG SCREEN COMMENT URINE SEE BELOW     Creatine, Urine 127.8 mg/dL    Amphetamine Screen, Urine PRESUMPTIVE NEGATIVE NEGATIVE    Barbiturate Screen, Urine PRESUMPTIVE NEGATIVE NEGATIVE    Cannabinoid Screen, Urine PRESUMPTIVE NEGATIVE NEGATIVE    Cocaine Screen, Urine PRESUMPTIVE NEGATIVE NEGATIVE    PCP Screen, Urine PRESUMPTIVE NEGATIVE  NEGATIVE    7-Aminoclonazepam <25 Cutoff <25 ng/mL    Alpha-Hydroxyalprazolam <25 Cutoff <25 ng/mL    Alpha-Hydroxymidazolam <25 Cutoff <25 ng/mL    Alprazolam <25 Cutoff <25 ng/mL    Chlordiazepoxide <25 Cutoff <25 ng/mL    Clonazepam <25 Cutoff <25 ng/mL    Diazepam <25 Cutoff <25 ng/mL    Lorazepam <25 Cutoff <25 ng/mL    Midazolam <25 Cutoff <25 ng/mL    Nordiazepam <25 Cutoff <25 ng/mL    Oxazepam <25 Cutoff <25 ng/mL    Temazepam <25 Cutoff <25 ng/mL    Zolpidem <25 Cutoff <25 ng/mL    Zolpidem Metabolite (ZCA) <25 Cutoff <25 ng/mL    6-Acetylmorphine <25 Cutoff <25 ng/mL    Codeine <50 Cutoff <50 ng/mL    Hydrocodone <25 Cutoff <25 ng/mL    Hydromorphone <25 Cutoff <25 ng/mL    Morphine Urine <50 Cutoff <50 ng/mL    Norhydrocodone <25 Cutoff <25 ng/mL    Noroxycodone <25 Cutoff <25 ng/mL    Oxycodone <25 Cutoff <25 ng/mL    Oxymorphone <25 Cutoff <25 ng/mL    Tramadol <50 Cutoff <50 ng/mL    O-Desmethyltramadol <50 Cutoff <50 ng/mL    Fentanyl <2.5 Cutoff<2.5 ng/mL    Norfentanyl <2.5 Cutoff<2.5 ng/mL    METHADONE CONFIRMATION,URINE <25 Cutoff <25 ng/mL    EDDP <25 Cutoff <25 ng/mL     Results are as expected.     Controlled Substance Agreement:  Date of the Last Agreement:   3/13/2023     Reviewed Controlled Substance Agreement including but not limited to the benefits, risks, and alternatives to treatment with a Controlled Substance medication(s).    Antidiarrheal:   What is the patient's goal of therapy?  Improve diarrhea   Is this being achieved with current treatment? yes  Is the patient currently prescribed an opioid, and/or benzodiazepine?   Yes, I feel it is clincially indicated to continue the medication and have discussed with the patient risks/benefits/alternatives.    Activities of Daily Living:   Is your overall impression that this patient is benefiting (symptom reduction outweighs side effects) from antidiarrheal therapy? No     1. Physical Functioning: Better  2. Family Relationship:  Better  3. Social Relationship: Better  4. Mood: Better  5. Sleep Patterns: Better  6. Overall Function: Better      Visit Vitals  /76   Pulse 63   Wt 87.8 kg (193 lb 9.6 oz)   SpO2 96%   BMI 29.44 kg/m²   Smoking Status Never   BSA 2.05 m²     PHYSICAL EXAM       REVIEW OF SYSTEMS       Allergies   Allergen Reactions    Penicillins Unknown    Valacyclovir Rash       Current Outpatient Medications   Medication Sig Dispense Refill    amLODIPine (Norvasc) 10 mg tablet Take 1 tablet (10 mg) by mouth once daily.      ascorbic acid/bioflavonoids (AYO-C PO) Take by mouth.      aspirin 81 mg EC tablet Take 1 tablet (81 mg) by mouth once daily.      atorvastatin (Lipitor) 40 mg tablet Take 1 tablet (40 mg) by mouth once daily.      biotin 1 mg capsule Take by mouth.      calcium carbonate-vit D3-min 600 mg calcium- 400 unit tablet Take by mouth.      cholecalciferol (Vitamin D-3) 50 mcg (2,000 unit) capsule Take by mouth.      coenzyme Q-10 300 mg capsule capsule Take by mouth.      cyanocobalamin (Vitamin B-12) 100 mcg tablet Take by mouth.      diphenoxylate-atropine (Lomotil) 2.5-0.025 mg tablet Take 1 tablet by mouth once daily. With additional tablet in evening if needed 30 tablet 2    famotidine (Pepcid) 40 mg tablet TAKE 1 TABLET BY MOUTH  TWICE DAILY 180 tablet 3    finasteride (Proscar) 5 mg tablet Take 1 tablet (5 mg) by mouth once daily.      glimepiride (Amaryl) 1 mg tablet Take by mouth.      glucosamine-D3-Boswellia serr 1,500-400-100 mg-unit-mg tablet Take by mouth.      hydroCHLOROthiazide (HYDRODiuril) 25 mg tablet Take 1 tablet (25 mg) by mouth once daily.      L.acid,ferm,aleksander,rha-B.bif,long (Controlled Delivery Probiotic) 126 mg (2 billion cell) tablet,delayed and ext.release Take by mouth.      levothyroxine (Synthroid, Levoxyl) 50 mcg tablet Take by mouth.      losartan (Cozaar) 50 mg tablet Take 1 tablet (50 mg) by mouth once daily.      metoprolol tartrate (Lopressor) 25 mg tablet Take 1  tablet (25 mg) by mouth 2 times a day.      multivit-min/folic/vit K/lycop (ONE-A-DAY MEN'S MULTIVITAMIN ORAL) Take by mouth. VitaCraves Oral Tablet Chewable      lo-nm-xv1-dha-epa-fish-lut-livan (Ocuvite Adult 50 Plus) 250 mg (90 mg-160 mg) capsule Take by mouth.      nitroglycerin (Nitrostat) 0.4 mg SL tablet Place 1 tablet (0.4 mg) under the tongue. Under the tongue as needed for chest pain      omega 3-dha-epa-fish oil (Fish OiL) 1,200 (144-216) mg capsule Take by mouth.      potassium chloride CR (K-Tab) 20 mEq ER tablet Take by mouth.      psyllium (Metamucil, with sugar,) 3.4 gram packet Take by mouth.      colchicine 0.6 mg tablet Take 1 tablet (0.6 mg) by mouth twice a day.       No current facility-administered medications for this visit.       Objective     Orders Only on 03/13/2023   Component Date Value Ref Range Status    DRUG SCREEN COMMENT URINE 03/13/2023 SEE BELOW   Final    Creatine, Urine 03/13/2023 127.8  mg/dL Final    Amphetamine Screen, Urine 03/13/2023 PRESUMPTIVE NEGATIVE  NEGATIVE Final    Barbiturate Screen, Urine 03/13/2023 PRESUMPTIVE NEGATIVE  NEGATIVE Final    Cannabinoid Screen, Urine 03/13/2023 PRESUMPTIVE NEGATIVE  NEGATIVE Final    Cocaine Screen, Urine 03/13/2023 PRESUMPTIVE NEGATIVE  NEGATIVE Final    PCP Screen, Urine 03/13/2023 PRESUMPTIVE NEGATIVE  NEGATIVE Final    7-Aminoclonazepam 03/13/2023 <25  Cutoff <25 ng/mL Final    Alpha-Hydroxyalprazolam 03/13/2023 <25  Cutoff <25 ng/mL Final    Alpha-Hydroxymidazolam 03/13/2023 <25  Cutoff <25 ng/mL Final    Alprazolam 03/13/2023 <25  Cutoff <25 ng/mL Final    Chlordiazepoxide 03/13/2023 <25  Cutoff <25 ng/mL Final    Clonazepam 03/13/2023 <25  Cutoff <25 ng/mL Final    Diazepam 03/13/2023 <25  Cutoff <25 ng/mL Final    Lorazepam 03/13/2023 <25  Cutoff <25 ng/mL Final    Midazolam 03/13/2023 <25  Cutoff <25 ng/mL Final    Nordiazepam 03/13/2023 <25  Cutoff <25 ng/mL Final    Oxazepam 03/13/2023 <25  Cutoff <25 ng/mL Final     Temazepam 03/13/2023 <25  Cutoff <25 ng/mL Final    Zolpidem 03/13/2023 <25  Cutoff <25 ng/mL Final    Zolpidem Metabolite (ZCA) 03/13/2023 <25  Cutoff <25 ng/mL Final    6-Acetylmorphine 03/13/2023 <25  Cutoff <25 ng/mL Final    Codeine 03/13/2023 <50  Cutoff <50 ng/mL Final    Hydrocodone 03/13/2023 <25  Cutoff <25 ng/mL Final    Hydromorphone 03/13/2023 <25  Cutoff <25 ng/mL Final    Morphine Urine 03/13/2023 <50  Cutoff <50 ng/mL Final    Norhydrocodone 03/13/2023 <25  Cutoff <25 ng/mL Final    Noroxycodone 03/13/2023 <25  Cutoff <25 ng/mL Final    Oxycodone 03/13/2023 <25  Cutoff <25 ng/mL Final    Oxymorphone 03/13/2023 <25  Cutoff <25 ng/mL Final    Tramadol 03/13/2023 <50  Cutoff <50 ng/mL Final    O-Desmethyltramadol 03/13/2023 <50  Cutoff <50 ng/mL Final    Fentanyl 03/13/2023 <2.5  Cutoff<2.5 ng/mL Final    Norfentanyl 03/13/2023 <2.5  Cutoff<2.5 ng/mL Final    METHADONE CONFIRMATION,URINE 03/13/2023 <25  Cutoff <25 ng/mL Final    EDDP 03/13/2023 <25  Cutoff <25 ng/mL Final       Radiology: Reviewed imaging in powerchart.  No results found.    Family History   Problem Relation Name Age of Onset    Stroke Mother      Heart failure Mother      Other (hepatic cirrhosis) Father      Parkinsonism Brother       Social History     Socioeconomic History    Marital status:      Spouse name: None    Number of children: None    Years of education: None    Highest education level: None   Occupational History    None   Tobacco Use    Smoking status: Never    Smokeless tobacco: Never   Vaping Use    Vaping status: None   Substance and Sexual Activity    Alcohol use: Never    Drug use: Never    Sexual activity: None   Other Topics Concern    None   Social History Narrative    None     Social Determinants of Health     Financial Resource Strain: Not on file   Food Insecurity: Not on file   Transportation Needs: Not on file   Physical Activity: Not on file   Stress: Not on file   Social Connections: Not on file    Intimate Partner Violence: Not on file   Housing Stability: Not on file     Past Medical History:   Diagnosis Date    Abnormal result of other cardiovascular function study     Abnormal nuclear stress test    Acute ischemic heart disease, unspecified (CMS/HCC)     ACS (acute coronary syndrome)    Atherosclerotic heart disease of native coronary artery without angina pectoris     Arteriosclerotic coronary artery disease    Chronic sinusitis, unspecified     Chronic congestion of paranasal sinus    Coronary angioplasty status     History of percutaneous coronary intervention    Encounter for preprocedural cardiovascular examination     Pre-operative cardiovascular examination    Encounter for screening for malignant neoplasm of prostate     Screening PSA (prostate specific antigen)    Lyme disease, unspecified 12/02/2013    Lyme disease    Other cervical disc displacement, unspecified cervical region     Herniated cervical disc without myelopathy    Other specified postprocedural states     History of carpal tunnel surgery    Other systemic sclerosis (CMS/HCC)     Cutaneous scleroderma    Personal history of malignant neoplasm, unspecified     History of malignant neoplasm    Personal history of other diseases of the circulatory system     History of hypertension    Personal history of other diseases of the circulatory system     History of abnormal electrocardiography    Personal history of other diseases of the digestive system     History of gastroesophageal reflux (GERD)    Personal history of other diseases of the musculoskeletal system and connective tissue     History of low back pain    Personal history of other diseases of the musculoskeletal system and connective tissue     History of spinal stenosis    Personal history of other diseases of the musculoskeletal system and connective tissue     History of arthritis    Personal history of other endocrine, nutritional and metabolic disease     History of  hypothyroidism    Personal history of other endocrine, nutritional and metabolic disease     History of hyperlipidemia    Personal history of other endocrine, nutritional and metabolic disease     History of type 2 diabetes mellitus    Personal history of other endocrine, nutritional and metabolic disease     History of hypoglycemia    Personal history of other specified conditions     History of chest pain    Polyp of stomach and duodenum     Gastric polyp     Past Surgical History:   Procedure Laterality Date    CORONARY ANGIOPLASTY WITH STENT PLACEMENT  07/27/2018    Cath Placement Of Stent 1    HAND SURGERY  07/27/2018    Hand Surgery                                                                                                                                                          OTHER SURGICAL HISTORY  07/27/2018    Surgery    OTHER SURGICAL HISTORY  07/27/2018    Total Hip Replacement Left       Charting was completed using voice recognition technology and may include unintended errors.

## 2023-06-13 ENCOUNTER — OFFICE VISIT (OUTPATIENT)
Dept: PRIMARY CARE | Facility: CLINIC | Age: 83
End: 2023-06-13
Payer: MEDICARE

## 2023-06-13 VITALS
BODY MASS INDEX: 28.95 KG/M2 | HEIGHT: 68 IN | DIASTOLIC BLOOD PRESSURE: 80 MMHG | SYSTOLIC BLOOD PRESSURE: 140 MMHG | OXYGEN SATURATION: 96 % | HEART RATE: 71 BPM | WEIGHT: 191 LBS

## 2023-06-13 DIAGNOSIS — I15.9 SECONDARY HYPERTENSION: Primary | ICD-10-CM

## 2023-06-13 DIAGNOSIS — K52.832 LYMPHOCYTIC COLITIS: ICD-10-CM

## 2023-06-13 DIAGNOSIS — K52.9 COLITIS: ICD-10-CM

## 2023-06-13 DIAGNOSIS — E11.9 TYPE 2 DIABETES MELLITUS WITHOUT COMPLICATION, WITHOUT LONG-TERM CURRENT USE OF INSULIN (MULTI): ICD-10-CM

## 2023-06-13 PROBLEM — R10.9 UNSPECIFIED ABDOMINAL PAIN: Status: RESOLVED | Noted: 2023-01-29 | Resolved: 2023-06-13

## 2023-06-13 PROCEDURE — 99214 OFFICE O/P EST MOD 30 MIN: CPT | Performed by: INTERNAL MEDICINE

## 2023-06-13 PROCEDURE — 3079F DIAST BP 80-89 MM HG: CPT | Performed by: INTERNAL MEDICINE

## 2023-06-13 PROCEDURE — 1159F MED LIST DOCD IN RCRD: CPT | Performed by: INTERNAL MEDICINE

## 2023-06-13 PROCEDURE — 3077F SYST BP >= 140 MM HG: CPT | Performed by: INTERNAL MEDICINE

## 2023-06-13 PROCEDURE — 1036F TOBACCO NON-USER: CPT | Performed by: INTERNAL MEDICINE

## 2023-06-13 RX ORDER — DIPHENOXYLATE HYDROCHLORIDE AND ATROPINE SULFATE 2.5; .025 MG/1; MG/1
1 TABLET ORAL DAILY
Qty: 30 TABLET | Refills: 2 | Status: SHIPPED | OUTPATIENT
Start: 2023-06-13 | End: 2023-09-13 | Stop reason: SDUPTHER

## 2023-06-13 RX ORDER — PITAVASTATIN CALCIUM 2.09 MG/1
TABLET, FILM COATED ORAL
COMMUNITY
Start: 2023-06-09 | End: 2024-04-11 | Stop reason: WASHOUT

## 2023-06-13 ASSESSMENT — PATIENT HEALTH QUESTIONNAIRE - PHQ9
SUM OF ALL RESPONSES TO PHQ9 QUESTIONS 1 AND 2: 0
1. LITTLE INTEREST OR PLEASURE IN DOING THINGS: NOT AT ALL
2. FEELING DOWN, DEPRESSED OR HOPELESS: NOT AT ALL

## 2023-06-13 NOTE — PROGRESS NOTES
Subjective   Patient ID: Curtis Brown is a 82 y.o. male who presents for the following    Controlled refill     MEDICARE WELLNESS 10/26/2022     Assessment/Plan   pericardial effusion: Patient no longer on colchicine and has follow up with dr montes     Hypothyroid: tsh wnl      htn: stable, continue metoprolol. slightly elevated but will check at home. improved, continue on hctz to 25mg daily , amlodipine 10mg daily.      hld: stable, continue statin. LDL < 70. Patient wants to change Lipitor to Crestor per patient's request. (Patient went to Dr Rodrigues and will get for another cholesterol medication)     dm2: very controlled, discussed treatment of low blood sugar. Patient follows with dr rodrigues, A1c 5.7 , 5 mg tablet Amaryl daily . Watch for low sugars. Follow up with endo      PVD: stable, continue statin and aspirin      diastolic CHF and severe aortic stenosis: stable, patient is very active. stable. getting echo with dr montes soon      lymphocytic colitis diarrhea: stable on diphenoxylate. increase #34       patient does not have children. needs to consider POA outs         hpi     male cad s/p stent 11/2013, PVD, htn, hld comes for the following       HX PERICARDITIS AND PERICARDIAL EFFUSION sEPTEMBER 2022: this has since resolved. and he follows with cardiology regularly for this     lymphocytic colitis (biopsy + May 18, 2021)diarrhea: patient is on diphenoxylate/atropine for his diarrhea on a regular basis. he was seen by Dr De La Paz (who has just recently retired). he is controlled on dipheoxylate-atropine. he does on occasion during the month require an additional dose. patient continues to do well on his current management      diastolic chf: Patient denies any chest pain, angina or exertional dyspnea. patient denies any swelling to lower extremities. Patient follows with cardiology on a regular basis     echo from feb 2020 ef 60% with moderate to severe aortic valve stenosis and mild tr.       Diabetes Type 2: patient denies any low blood sugars. Patient is following with opthalmology on a yearly basis. Patient is taking glimepiride a1c 5s typically. patient following with dr christina.      hypothyroid: patient denies any hypo or hypothyroid symptoms. patient denies any palpitations, diarrhea or constipation     HLD: Patient denies any muscle aches and is tolerating statin therapy     pvd: denies any previous strokes, no headaches. no claudication     spinal stenosis: pain intermittent, in gluteal region     social: patient denies any smoking, drug or alcohol abuse     surgical history: left hip replacement 11/2017     COL[facility]OSCOPY MAY 2021 AT [facility] with 5 year follow up      HOSPITALIZATIONS   September 3 to September 7, 2022 @[facility] . Patient came in originally to [facility] [place] for shortness of breath and chest pain. Patient was found to have acute pericarditis along with moderate-sized pericardial effusion. He was placed on colchicine twice a day and his symptoms dramatically improved. Cardiology as well as endocrinology was following him and he was discharged on colchicine.     OARRS:  No data recorded  I have personally reviewed the OARRS report for Curtis Brown. I have considered the risks of abuse, dependence, addiction and diversion    Is the patient prescribed a combination of a benzodiazepine and opioid?  Yes, I feel it is clincially indicated to continue the medication and have discussed with the patient risks/benefits/alternatives.    Last Urine Drug Screen / ordered today: Yes  Recent Results (from the past [id number] hour(s))   OPIATE/OPIOID/BENZO PRESCRIPTION COMPLIANCE    Collection Time: 03/13/23  4:02 PM   Result Value Ref Range    DRUG SCREEN COMMENT URINE SEE BELOW     Creatine, Urine 127.8 mg/dL    Amphetamine Screen, Urine PRESUMPTIVE NEGATIVE NEGATIVE    Barbiturate Screen, Urine PRESUMPTIVE NEGATIVE NEGATIVE    Cannabinoid Screen, Urine  PRESUMPTIVE NEGATIVE NEGATIVE    Cocaine Screen, Urine PRESUMPTIVE NEGATIVE NEGATIVE    PCP Screen, Urine PRESUMPTIVE NEGATIVE NEGATIVE    7-Aminoclonazepam <25 Cutoff <25 ng/mL    Alpha-Hydroxyalprazolam <25 Cutoff <25 ng/mL    Alpha-Hydroxymidazolam <25 Cutoff <25 ng/mL    Alprazolam <25 Cutoff <25 ng/mL    Chlordiazepoxide <25 Cutoff <25 ng/mL    Clonazepam <25 Cutoff <25 ng/mL    Diazepam <25 Cutoff <25 ng/mL    Lorazepam <25 Cutoff <25 ng/mL    Midazolam <25 Cutoff <25 ng/mL    Nordiazepam <25 Cutoff <25 ng/mL    Oxazepam <25 Cutoff <25 ng/mL    Temazepam <25 Cutoff <25 ng/mL    Zolpidem <25 Cutoff <25 ng/mL    Zolpidem Metabolite (ZCA) <25 Cutoff <25 ng/mL    6-Acetylmorphine <25 Cutoff <25 ng/mL    Codeine <50 Cutoff <50 ng/mL    Hydrocodone <25 Cutoff <25 ng/mL    Hydromorphone <25 Cutoff <25 ng/mL    Morphine Urine <50 Cutoff <50 ng/mL    Norhydrocodone <25 Cutoff <25 ng/mL    Noroxycodone <25 Cutoff <25 ng/mL    Oxycodone <25 Cutoff <25 ng/mL    Oxymorphone <25 Cutoff <25 ng/mL    Tramadol <50 Cutoff <50 ng/mL    O-Desmethyltramadol <50 Cutoff <50 ng/mL    Fentanyl <2.5 Cutoff<2.5 ng/mL    Norfentanyl <2.5 Cutoff<2.5 ng/mL    METHADONE CONFIRMATION,URINE <25 Cutoff <25 ng/mL    EDDP <25 Cutoff <25 ng/mL     Results are as expected.     Controlled Substance Agreement:  Date of the Last Agreement:   3/13/2023     Reviewed Controlled Substance Agreement including but not limited to the benefits, risks, and alternatives to treatment with a Controlled Substance medication(s).    Antidiarrheal:   What is the patient's goal of therapy?  Improve diarrhea   Is this being achieved with current treatment? yes  Is the patient currently prescribed an opioid, and/or benzodiazepine?   Yes, I feel it is clincially indicated to continue the medication and have discussed with the patient risks/benefits/alternatives.    Activities of Daily Living:   Is your overall impression that this patient is benefiting (symptom  "reduction outweighs side effects) from antidiarrheal therapy? No     1. Physical Functioning: Better  2. Family Relationship: Better  3. Social Relationship: Better  4. Mood: Better  5. Sleep Patterns: Better  6. Overall Function: Better      Visit Vitals  /80   Pulse 71   Ht 1.727 m (5' 8\")   Wt 86.6 kg (191 lb)   SpO2 96%   BMI 29.04 kg/m²   Smoking Status Never   BSA 2.04 m²     PHYSICAL EXAM   General appearance: Alert and in no acute distress. speech is clear and coherent  No head trauma  Neck: no carotid bruits or thyromegaly. no lymphadenopathy   Respiratory : No respiratory distress, normal respiratory rhythm and effort. Clear bilateral breath sounds. No wheezing or rhonchi.   Cardiovascular: heart rate regular, S1, S2. no murmurs. no Lower extremity edema  Skin inspection: Normal skin color and pigmentation, normal skin turgor and no visible rash, induration, or cellulitis  MSK: 5/5 strength upper and lower extremities without gait abnormalities. no loss of muscle mass   Neuro: 2-12 CN grossly intact.  no slurred speech. no lateralizing deficit  Psychiatric Orientation: Oriented to person, place, and time. no depression, homicidal or suicidal thoughts, normal affect     REVIEW OF SYSTEMS     Constitutional: not feeling tired and no fever, chills or sweats. Denies weight loss  no headache  Cardiovascular: no exertional chest pain, no palpitations, no lower extremity edema and no intermittent leg claudication.   Lungs: Denies shortness of breath, exertional dyspnea, wheezing  Gastrointestinal: no change in bowel habits, no diarrhea, no nausea, no vomiting and no abdominal pain. Denies Melena, brbpr or dark stool  Musculoskeletal: no myalgias, no muscle weakness and no limb swelling.   Skin: no rashes, no change in skin color and pigmentation and no skin lumps.   Neurological: no headaches, no seizures, no numbness, no lateralizing deficits and no fainting.   Psychiatric: no depression and no anxiety. "   Urine: denies polyuria, hematuria, dysuria      Allergies   Allergen Reactions    Penicillins Unknown    Valacyclovir Rash       Current Outpatient Medications   Medication Sig Dispense Refill    amLODIPine (Norvasc) 10 mg tablet Take 1 tablet (10 mg) by mouth once daily. 90 tablet 3    ascorbic acid/bioflavonoids (AYO-C PO) Take by mouth.      aspirin 81 mg EC tablet Take 1 tablet (81 mg) by mouth once daily.      biotin 1 mg capsule Take by mouth.      calcium carbonate-vit D3-min 600 mg calcium- 400 unit tablet Take by mouth.      cholecalciferol (Vitamin D-3) 50 mcg (2,000 unit) capsule Take by mouth.      coenzyme Q-10 300 mg capsule capsule Take by mouth.      colchicine 0.6 mg tablet Take 1 tablet (0.6 mg) by mouth twice a day.      cyanocobalamin (Vitamin B-12) 100 mcg tablet Take by mouth.      diphenoxylate-atropine (Lomotil) 2.5-0.025 mg tablet Take 1 tablet by mouth once daily. With additional tablet in evening if needed 30 tablet 2    famotidine (Pepcid) 40 mg tablet Take 1 tablet (40 mg) by mouth 2 times a day. 180 tablet 3    finasteride (Proscar) 5 mg tablet Take 1 tablet (5 mg) by mouth once daily.      glimepiride (Amaryl) 1 mg tablet Take by mouth.      glucosamine-D3-Boswellia serr 1,500-400-100 mg-unit-mg tablet Take by mouth.      hydroCHLOROthiazide (HYDRODiuril) 25 mg tablet Take 1 tablet (25 mg) by mouth once daily. 90 tablet 3    L.acid,ferm,aleksander,rha-B.bif,long (Controlled Delivery Probiotic) 126 mg (2 billion cell) tablet,delayed and ext.release Take by mouth.      levothyroxine (Synthroid, Levoxyl) 50 mcg tablet Take by mouth.      Livalo 2 mg tablet       losartan (Cozaar) 50 mg tablet Take 1 tablet (50 mg) by mouth once daily.      metoprolol tartrate (Lopressor) 25 mg tablet Take 1 tablet (25 mg) by mouth 2 times a day.      multivit-min/folic/vit K/lycop (ONE-A-DAY MEN'S MULTIVITAMIN ORAL) Take by mouth. VitaCraves Oral Tablet Chewable      qf-fz-ar8-dha-epa-fish-lut-livan (Ocuvite  Adult 50 Plus) 250 mg (90 mg-160 mg) capsule Take by mouth.      nitroglycerin (Nitrostat) 0.4 mg SL tablet Place 1 tablet (0.4 mg) under the tongue. Under the tongue as needed for chest pain      omega 3-dha-epa-fish oil (Fish OiL) 1,200 (144-216) mg capsule Take by mouth.      potassium chloride CR (K-Tab) 20 mEq ER tablet Take by mouth.      psyllium (Metamucil, with sugar,) 3.4 gram packet Take by mouth.      rosuvastatin (Crestor) 20 mg tablet Take 1 tablet (20 mg) by mouth once daily. (Patient not taking: Reported on 6/13/2023) 90 tablet 3     No current facility-administered medications for this visit.       Objective     Orders Only on 03/13/2023   Component Date Value Ref Range Status    DRUG SCREEN COMMENT URINE 03/13/2023 SEE BELOW   Final    Creatine, Urine 03/13/2023 127.8  mg/dL Final    Amphetamine Screen, Urine 03/13/2023 PRESUMPTIVE NEGATIVE  NEGATIVE Final    Barbiturate Screen, Urine 03/13/2023 PRESUMPTIVE NEGATIVE  NEGATIVE Final    Cannabinoid Screen, Urine 03/13/2023 PRESUMPTIVE NEGATIVE  NEGATIVE Final    Cocaine Screen, Urine 03/13/2023 PRESUMPTIVE NEGATIVE  NEGATIVE Final    PCP Screen, Urine 03/13/2023 PRESUMPTIVE NEGATIVE  NEGATIVE Final    7-Aminoclonazepam 03/13/2023 <25  Cutoff <25 ng/mL Final    Alpha-Hydroxyalprazolam 03/13/2023 <25  Cutoff <25 ng/mL Final    Alpha-Hydroxymidazolam 03/13/2023 <25  Cutoff <25 ng/mL Final    Alprazolam 03/13/2023 <25  Cutoff <25 ng/mL Final    Chlordiazepoxide 03/13/2023 <25  Cutoff <25 ng/mL Final    Clonazepam 03/13/2023 <25  Cutoff <25 ng/mL Final    Diazepam 03/13/2023 <25  Cutoff <25 ng/mL Final    Lorazepam 03/13/2023 <25  Cutoff <25 ng/mL Final    Midazolam 03/13/2023 <25  Cutoff <25 ng/mL Final    Nordiazepam 03/13/2023 <25  Cutoff <25 ng/mL Final    Oxazepam 03/13/2023 <25  Cutoff <25 ng/mL Final    Temazepam 03/13/2023 <25  Cutoff <25 ng/mL Final    Zolpidem 03/13/2023 <25  Cutoff <25 ng/mL Final    Zolpidem Metabolite (ZCA) 03/13/2023 <25   Cutoff <25 ng/mL Final    6-Acetylmorphine 03/13/2023 <25  Cutoff <25 ng/mL Final    Codeine 03/13/2023 <50  Cutoff <50 ng/mL Final    Hydrocodone 03/13/2023 <25  Cutoff <25 ng/mL Final    Hydromorphone 03/13/2023 <25  Cutoff <25 ng/mL Final    Morphine Urine 03/13/2023 <50  Cutoff <50 ng/mL Final    Norhydrocodone 03/13/2023 <25  Cutoff <25 ng/mL Final    Noroxycodone 03/13/2023 <25  Cutoff <25 ng/mL Final    Oxycodone 03/13/2023 <25  Cutoff <25 ng/mL Final    Oxymorphone 03/13/2023 <25  Cutoff <25 ng/mL Final    Tramadol 03/13/2023 <50  Cutoff <50 ng/mL Final    O-Desmethyltramadol 03/13/2023 <50  Cutoff <50 ng/mL Final    Fentanyl 03/13/2023 <2.5  Cutoff<2.5 ng/mL Final    Norfentanyl 03/13/2023 <2.5  Cutoff<2.5 ng/mL Final    METHADONE CONFIRMATION,URINE 03/13/2023 <25  Cutoff <25 ng/mL Final    EDDP 03/13/2023 <25  Cutoff <25 ng/mL Final       Radiology: Reviewed imaging in powerchart.  No results found.    Family History   Problem Relation Name Age of Onset    Stroke Mother      Heart failure Mother      Other (hepatic cirrhosis) Father      Parkinsonism Brother       Social History     Socioeconomic History    Marital status:      Spouse name: None    Number of children: None    Years of education: None    Highest education level: None   Occupational History    None   Tobacco Use    Smoking status: Never    Smokeless tobacco: Never   Substance and Sexual Activity    Alcohol use: Never    Drug use: Never    Sexual activity: None   Other Topics Concern    None   Social History Narrative    None     Social Determinants of Health     Financial Resource Strain: Not on file   Food Insecurity: Not on file   Transportation Needs: Not on file   Physical Activity: Not on file   Stress: Not on file   Social Connections: Not on file   Intimate Partner Violence: Not on file   Housing Stability: Not on file     Past Medical History:   Diagnosis Date    Abnormal result of other cardiovascular function study      Abnormal nuclear stress test    Acute ischemic heart disease, unspecified (CMS/HCC)     ACS (acute coronary syndrome)    Atherosclerotic heart disease of native coronary artery without angina pectoris     Arteriosclerotic coronary artery disease    Chronic sinusitis, unspecified     Chronic congestion of paranasal sinus    Coronary angioplasty status     History of percutaneous coronary intervention    Encounter for preprocedural cardiovascular examination     Pre-operative cardiovascular examination    Encounter for screening for malignant neoplasm of prostate     Screening PSA (prostate specific antigen)    Lyme disease, unspecified 12/02/2013    Lyme disease    Other cervical disc displacement, unspecified cervical region     Herniated cervical disc without myelopathy    Other specified postprocedural states     History of carpal tunnel surgery    Other systemic sclerosis (CMS/HCC)     Cutaneous scleroderma    Personal history of malignant neoplasm, unspecified     History of malignant neoplasm    Personal history of other diseases of the circulatory system     History of hypertension    Personal history of other diseases of the circulatory system     History of abnormal electrocardiography    Personal history of other diseases of the digestive system     History of gastroesophageal reflux (GERD)    Personal history of other diseases of the musculoskeletal system and connective tissue     History of low back pain    Personal history of other diseases of the musculoskeletal system and connective tissue     History of spinal stenosis    Personal history of other diseases of the musculoskeletal system and connective tissue     History of arthritis    Personal history of other endocrine, nutritional and metabolic disease     History of hypothyroidism    Personal history of other endocrine, nutritional and metabolic disease     History of hyperlipidemia    Personal history of other endocrine, nutritional and metabolic  disease     History of type 2 diabetes mellitus    Personal history of other endocrine, nutritional and metabolic disease     History of hypoglycemia    Personal history of other specified conditions     History of chest pain    Polyp of stomach and duodenum     Gastric polyp     Past Surgical History:   Procedure Laterality Date    CORONARY ANGIOPLASTY WITH STENT PLACEMENT  07/27/2018    Cath Placement Of Stent 1    HAND SURGERY  07/27/2018    Hand Surgery                                                                                                                                                          OTHER SURGICAL HISTORY  07/27/2018    Surgery    OTHER SURGICAL HISTORY  07/27/2018    Total Hip Replacement Left       Charting was completed using voice recognition technology and may include unintended errors.

## 2023-08-31 PROBLEM — K31.7 GASTRIC POLYP: Status: ACTIVE | Noted: 2017-02-03

## 2023-08-31 PROBLEM — K21.9 GASTROESOPHAGEAL REFLUX DISEASE WITHOUT ESOPHAGITIS: Status: ACTIVE | Noted: 2017-02-03

## 2023-08-31 PROBLEM — M15.9 PRIMARY OSTEOARTHRITIS INVOLVING MULTIPLE JOINTS: Status: ACTIVE | Noted: 2017-02-03

## 2023-08-31 PROBLEM — M16.9 OSTEOARTHRITIS OF HIP: Status: ACTIVE | Noted: 2023-08-31

## 2023-08-31 PROBLEM — K21.9 ACID REFLUX: Status: ACTIVE | Noted: 2023-08-31

## 2023-08-31 PROBLEM — M48.10 DISH (DIFFUSE IDIOPATHIC SKELETAL HYPEROSTOSIS): Status: ACTIVE | Noted: 2017-08-23

## 2023-08-31 PROBLEM — M15.0 PRIMARY OSTEOARTHRITIS INVOLVING MULTIPLE JOINTS: Status: ACTIVE | Noted: 2017-02-03

## 2023-08-31 PROBLEM — B35.1 ONYCHOMYCOSIS: Status: ACTIVE | Noted: 2023-08-31

## 2023-08-31 PROBLEM — D23.111: Status: ACTIVE | Noted: 2019-01-09

## 2023-08-31 RX ORDER — SODIUM CHLORIDE 0.65 %
2 AEROSOL, SPRAY (ML) NASAL 4 TIMES DAILY PRN
COMMUNITY
Start: 2013-09-27 | End: 2023-10-09 | Stop reason: WASHOUT

## 2023-08-31 RX ORDER — BLOOD SUGAR DIAGNOSTIC
STRIP MISCELLANEOUS
COMMUNITY
Start: 2023-04-11

## 2023-08-31 RX ORDER — MECLIZINE HYDROCHLORIDE 25 MG/1
25 TABLET ORAL 3 TIMES DAILY
COMMUNITY
Start: 2019-03-01 | End: 2023-10-09 | Stop reason: WASHOUT

## 2023-08-31 RX ORDER — SELENIUM 50 MCG
1 TABLET ORAL DAILY
COMMUNITY
Start: 2022-08-27

## 2023-08-31 RX ORDER — ATORVASTATIN CALCIUM 40 MG/1
40 TABLET, FILM COATED ORAL
COMMUNITY
Start: 2016-06-15 | End: 2023-10-25 | Stop reason: ALTCHOICE

## 2023-08-31 RX ORDER — CYCLOBENZAPRINE HCL 10 MG
10 TABLET ORAL 3 TIMES DAILY PRN
COMMUNITY
Start: 2022-08-27 | End: 2023-10-09 | Stop reason: WASHOUT

## 2023-08-31 RX ORDER — ALBUTEROL SULFATE 90 UG/1
2 AEROSOL, METERED RESPIRATORY (INHALATION) EVERY 4 HOURS PRN
COMMUNITY
Start: 2018-03-21 | End: 2023-10-09 | Stop reason: WASHOUT

## 2023-08-31 RX ORDER — ACETAMINOPHEN 500 MG
1 TABLET ORAL DAILY
COMMUNITY
Start: 2017-11-01 | End: 2024-04-11 | Stop reason: WASHOUT

## 2023-08-31 RX ORDER — PREDNISONE 5 MG/1
5 TABLET ORAL
COMMUNITY
Start: 2023-01-31 | End: 2023-10-09 | Stop reason: WASHOUT

## 2023-08-31 RX ORDER — LOSARTAN POTASSIUM AND HYDROCHLOROTHIAZIDE 12.5; 5 MG/1; MG/1
TABLET ORAL
COMMUNITY
Start: 2016-06-15 | End: 2023-10-09 | Stop reason: WASHOUT

## 2023-08-31 RX ORDER — BENZONATATE 200 MG/1
1 CAPSULE ORAL 3 TIMES DAILY PRN
COMMUNITY
Start: 2022-09-16 | End: 2023-10-09 | Stop reason: WASHOUT

## 2023-08-31 RX ORDER — LANCETS
1 EACH MISCELLANEOUS DAILY
COMMUNITY
Start: 2023-04-11

## 2023-08-31 RX ORDER — PSYLLIUM HUSK 3.4 G/5.8G
3.4 POWDER ORAL
COMMUNITY
Start: 2022-08-27 | End: 2023-10-09 | Stop reason: WASHOUT

## 2023-08-31 RX ORDER — NAPROXEN 500 MG/1
500 TABLET ORAL 2 TIMES DAILY PRN
COMMUNITY
Start: 2022-08-27 | End: 2023-10-09 | Stop reason: WASHOUT

## 2023-08-31 RX ORDER — CLINDAMYCIN HYDROCHLORIDE 300 MG/1
300 CAPSULE ORAL
COMMUNITY
Start: 2022-11-01

## 2023-09-13 ENCOUNTER — OFFICE VISIT (OUTPATIENT)
Dept: PRIMARY CARE | Facility: CLINIC | Age: 83
End: 2023-09-13
Payer: MEDICARE

## 2023-09-13 VITALS
BODY MASS INDEX: 29.13 KG/M2 | WEIGHT: 192.2 LBS | TEMPERATURE: 98.9 F | HEIGHT: 68 IN | DIASTOLIC BLOOD PRESSURE: 60 MMHG | OXYGEN SATURATION: 97 % | HEART RATE: 73 BPM | SYSTOLIC BLOOD PRESSURE: 118 MMHG

## 2023-09-13 DIAGNOSIS — R35.0 BENIGN PROSTATIC HYPERPLASIA WITH URINARY FREQUENCY: ICD-10-CM

## 2023-09-13 DIAGNOSIS — I50.32 CHRONIC DIASTOLIC CONGESTIVE HEART FAILURE (MULTI): ICD-10-CM

## 2023-09-13 DIAGNOSIS — N40.1 BENIGN PROSTATIC HYPERPLASIA WITH URINARY FREQUENCY: ICD-10-CM

## 2023-09-13 DIAGNOSIS — E11.9 TYPE 2 DIABETES MELLITUS WITHOUT COMPLICATION, WITHOUT LONG-TERM CURRENT USE OF INSULIN (MULTI): ICD-10-CM

## 2023-09-13 DIAGNOSIS — K52.832 LYMPHOCYTIC COLITIS: Primary | ICD-10-CM

## 2023-09-13 DIAGNOSIS — I73.9 PVD (PERIPHERAL VASCULAR DISEASE) (CMS-HCC): ICD-10-CM

## 2023-09-13 DIAGNOSIS — K21.9 GASTROESOPHAGEAL REFLUX DISEASE, UNSPECIFIED WHETHER ESOPHAGITIS PRESENT: ICD-10-CM

## 2023-09-13 PROCEDURE — 3074F SYST BP LT 130 MM HG: CPT | Performed by: INTERNAL MEDICINE

## 2023-09-13 PROCEDURE — 99214 OFFICE O/P EST MOD 30 MIN: CPT | Performed by: INTERNAL MEDICINE

## 2023-09-13 PROCEDURE — 1159F MED LIST DOCD IN RCRD: CPT | Performed by: INTERNAL MEDICINE

## 2023-09-13 PROCEDURE — 1036F TOBACCO NON-USER: CPT | Performed by: INTERNAL MEDICINE

## 2023-09-13 PROCEDURE — 3078F DIAST BP <80 MM HG: CPT | Performed by: INTERNAL MEDICINE

## 2023-09-13 RX ORDER — DIPHENOXYLATE HYDROCHLORIDE AND ATROPINE SULFATE 2.5; .025 MG/1; MG/1
1 TABLET ORAL DAILY
Qty: 30 TABLET | Refills: 2 | Status: SHIPPED | OUTPATIENT
Start: 2023-09-13 | End: 2023-12-13 | Stop reason: SDUPTHER

## 2023-09-13 RX ORDER — PITAVASTATIN CALCIUM 2.09 MG/1
TABLET, FILM COATED ORAL
Qty: 30 TABLET | Refills: 0 | Status: CANCELLED | OUTPATIENT
Start: 2023-09-13

## 2023-09-13 RX ORDER — FINASTERIDE 5 MG/1
5 TABLET, FILM COATED ORAL DAILY
Qty: 90 TABLET | Refills: 3 | Status: SHIPPED | OUTPATIENT
Start: 2023-09-13 | End: 2023-10-25 | Stop reason: SDUPTHER

## 2023-09-13 NOTE — ASSESSMENT & PLAN NOTE
very controlled, discussed treatment of low blood sugar. Patient follows with dr christina, A1c 5.7 , 5 mg tablet Amaryl daily . Watch for low sugars. Follow up with endo

## 2023-09-13 NOTE — ASSESSMENT & PLAN NOTE
[facility]  [place] [address] [facility], [address] [facility]  Drs: Bekah Valderrama, Peggy Cameron    Plan:  1. Re-order MRI after speaking the patient    2. Continue IV antimicrobials per ID    3. Nurses to do daily dressing changes on both feet. 4. I spoke with physical therapy and they will do cam boot on left foot and scooter on the right lower extremity. S/  Patient says he no longer has foot pain or leg pain, nausea or vomiting. \    O/  Alert & oriented x 3, in NAD  Constitutional:wnl    Patient is status post right foot: incision and drainage, resection of infected bone (about 1/3 of 1st metartarsal and total metatarsal phalangeal joint, bone biopsy, secondary closure of heel ulcer). + decreased right foot arch redness compared to yesterday, however there is continued, but not as vibrant redness dorsal medial right foot. Negative drainage, +calor, negative foul odor. + sutures intact submet 1 and heel. + ulcer proximal to submet 1 with iodoform packing. Left foot submet 1 ulceration with maceration about 1.5-2cm x 1.5-2cm and 3mm depth. No apparent signs of infection left foot. Results for Megan Gutierrez (MRN [id number]) as of 3/27/2018 16:26   Ref.  Range 3/27/2018 06:35 3/27/2018 07:20   GLUCOSE,FAST - POC Latest Ref Range: 70 - 110 mg/dL  131 (H)   Sodium Latest Ref Range: 136 - 145 mmol/L 145    Potassium Latest Ref Range: 3.5 - 5.5 mmol/L 3.7    Chloride Latest Ref Range: 100 - 108 mmol/L 110 (H)    CO2 Latest Ref Range: 21 - 32 mmol/L 26    Anion gap Latest Ref Range: 3.0 - 18 mmol/L 9    Glucose Latest Ref Range: 74 - 99 mg/dL 136 (H)    BUN Latest Ref Range: 7.0 - 18 MG/DL 9    Creatinine Latest Ref Range: 0.6 - 1.3 MG/DL 0.82    BUN/Creatinine ratio Latest Ref Range: 12 - 20   11 (L) stable, patient is very active. stable. getting echo with dr montes soon

## 2023-09-13 NOTE — PROGRESS NOTES
Subjective   Patient ID: Curtis Brown is a 82 y.o. male who presents for the following    Controlled refill     MEDICARE WELLNESS 10/26/2022     Assessment/Plan   lymphocytic colitis diarrhea: stable on diphenoxylate. increase #30     pericardial effusion: Patient no longer on colchicine and has follow up with dr montes     Hypothyroid: tsh wnl      htn: stable, continue metoprolol. slightly elevated but will check at home. improved, continue on hctz to 25mg daily , amlodipine 10mg daily.      hld: stable, continue statin. LDL < 70. Patient wants to change Lipitor to Crestor per patient's request. (Patient went to Dr Rodrigues and will get for another cholesterol medication)     dm2: very controlled, discussed treatment of low blood sugar. Patient follows with dr rodrigues, A1c 5.7 , 5 mg tablet Amaryl daily . Watch for low sugars. Follow up with endo      PVD: stable, continue statin and aspirin      diastolic CHF and severe aortic stenosis: stable, patient is very active. stable. getting echo with dr montes soon        BPH: patient is taking finasteride. He denies any complaints and he needs a refill       patient does not have children. needs to consider POA outs         hpi  male cad s/p stent 11/2013, PVD, htn, hld comes for the following     HX PERICARDITIS AND PERICARDIAL EFFUSION sEPTEMBER 2022: this has since resolved. and he follows with cardiology regularly for this     lymphocytic colitis (biopsy + May 18, 2021)diarrhea: patient is on diphenoxylate/atropine for his diarrhea on a regular basis. he was seen by Dr De La Paz (who has just recently retired). he is controlled on dipheoxylate-atropine. he does on occasion during the month require an additional dose. patient continues to do well on his current management      diastolic chf: Patient denies any chest pain, angina or exertional dyspnea. patient denies any swelling to lower extremities. Patient follows with cardiology on a regular basis     echo from  feb 2020 ef 60% with moderate to severe aortic valve stenosis and mild tr.      Diabetes Type 2: patient denies any low blood sugars. Patient is following with opthalmology on a yearly basis. Patient is taking glimepiride a1c 5s typically. patient following with dr christina.      hypothyroid: patient denies any hypo or hypothyroid symptoms. patient denies any palpitations, diarrhea or constipation     HLD: Patient denies any muscle aches and is tolerating statin therapy     pvd: denies any previous strokes, no headaches. no claudication     spinal stenosis: pain intermittent, in gluteal region     social: patient denies any smoking, drug or alcohol abuse     surgical history: left hip replacement 11/2017     COL[facility]OSCOPY MAY 2021 AT [facility] with 5 year follow up      HOSPITALIZATIONS   September 3 to September 7, 2022 @[facility] . Patient came in originally to [facility] for shortness of breath and chest pain. Patient was found to have acute pericarditis along with moderate-sized pericardial effusion. He was placed on colchicine twice a day and his symptoms dramatically improved. Cardiology as well as endocrinology was following him and he was discharged on colchicine.     OARRS:  No data recorded  I have personally reviewed the OARRS report for Curtis Brown. I have considered the risks of abuse, dependence, addiction and diversion    Is the patient prescribed a combination of a benzodiazepine and opioid?  Yes, I feel it is clincially indicated to continue the medication and have discussed with the patient risks/benefits/alternatives.    Last Urine Drug Screen / ordered today: Yes  Recent Results (from the past 8760 hour(s))   OPIATE/OPIOID/BENZO PRESCRIPTION COMPLIANCE    Collection Time: 03/13/23  4:02 PM   Result Value Ref Range    DRUG SCREEN COMMENT URINE SEE BELOW     Creatine, Urine 127.8 mg/dL    Amphetamine Screen, Urine PRESUMPTIVE NEGATIVE NEGATIVE     Barbiturate Screen, Urine PRESUMPTIVE NEGATIVE NEGATIVE    Cannabinoid Screen, Urine PRESUMPTIVE NEGATIVE NEGATIVE    Cocaine Screen, Urine PRESUMPTIVE NEGATIVE NEGATIVE    PCP Screen, Urine PRESUMPTIVE NEGATIVE NEGATIVE    7-Aminoclonazepam <25 Cutoff <25 ng/mL    Alpha-Hydroxyalprazolam <25 Cutoff <25 ng/mL    Alpha-Hydroxymidazolam <25 Cutoff <25 ng/mL    Alprazolam <25 Cutoff <25 ng/mL    Chlordiazepoxide <25 Cutoff <25 ng/mL    Clonazepam <25 Cutoff <25 ng/mL    Diazepam <25 Cutoff <25 ng/mL    Lorazepam <25 Cutoff <25 ng/mL    Midazolam <25 Cutoff <25 ng/mL    Nordiazepam <25 Cutoff <25 ng/mL    Oxazepam <25 Cutoff <25 ng/mL    Temazepam <25 Cutoff <25 ng/mL    Zolpidem <25 Cutoff <25 ng/mL    Zolpidem Metabolite (ZCA) <25 Cutoff <25 ng/mL    6-Acetylmorphine <25 Cutoff <25 ng/mL    Codeine <50 Cutoff <50 ng/mL    Hydrocodone <25 Cutoff <25 ng/mL    Hydromorphone <25 Cutoff <25 ng/mL    Morphine Urine <50 Cutoff <50 ng/mL    Norhydrocodone <25 Cutoff <25 ng/mL    Noroxycodone <25 Cutoff <25 ng/mL    Oxycodone <25 Cutoff <25 ng/mL    Oxymorphone <25 Cutoff <25 ng/mL    Tramadol <50 Cutoff <50 ng/mL    O-Desmethyltramadol <50 Cutoff <50 ng/mL    Fentanyl <2.5 Cutoff<2.5 ng/mL    Norfentanyl <2.5 Cutoff<2.5 ng/mL    METHADONE CONFIRMATION,URINE <25 Cutoff <25 ng/mL    EDDP <25 Cutoff <25 ng/mL     Results are as expected.     Controlled Substance Agreement:  Date of the Last Agreement:   3/13/2023     Reviewed Controlled Substance Agreement including but not limited to the benefits, risks, and alternatives to treatment with a Controlled Substance medication(s).    Antidiarrheal:   What is the patient's goal of therapy?  Improve diarrhea   Is this being achieved with current treatment? yes  Is the patient currently prescribed an opioid, and/or benzodiazepine?   Yes, I feel it is clincially indicated to continue the medication and have discussed with the patient risks/benefits/alternatives.    Activities of Daily  "Living:   Is your overall impression that this patient is benefiting (symptom reduction outweighs side effects) from antidiarrheal therapy? No     1. Physical Functioning: Better  2. Family Relationship: Better  3. Social Relationship: Better  4. Mood: Better  5. Sleep Patterns: Better  6. Overall Function: Better      Visit Vitals  /60   Pulse 73   Temp 37.2 °C (98.9 °F)   Ht 1.727 m (5' 8\")   Wt 87.2 kg (192 lb 3.2 oz)   SpO2 97%   BMI 29.22 kg/m²   Smoking Status Never   BSA 2.05 m²     PHYSICAL EXAM   General appearance: Alert and in no acute distress. speech is clear and coherent  No head trauma  Neck: no carotid bruits or thyromegaly. no lymphadenopathy   Respiratory : No respiratory distress, normal respiratory rhythm and effort. Clear bilateral breath sounds. No wheezing or rhonchi.   Cardiovascular: heart rate regular, S1, S2. no murmurs. no Lower extremity edema  Skin inspection: Normal skin color and pigmentation, normal skin turgor and no visible rash, induration, or cellulitis  MSK: 5/5 strength upper and lower extremities without gait abnormalities. no loss of muscle mass   Neuro: 2-12 CN grossly intact.  no slurred speech. no lateralizing deficit  Psychiatric Orientation: Oriented to person, place, and time. no depression, homicidal or suicidal thoughts, normal affect     REVIEW OF SYSTEMS     Constitutional: not feeling tired and no fever, chills or sweats. Denies weight loss  no headache  Cardiovascular: no exertional chest pain, no palpitations, no lower extremity edema and no intermittent leg claudication.   Lungs: Denies shortness of breath, exertional dyspnea, wheezing  Gastrointestinal: no change in bowel habits, no diarrhea, no nausea, no vomiting and no abdominal pain. Denies Melena, brbpr or dark stool  Musculoskeletal: no myalgias, no muscle weakness and no limb swelling.   Skin: no rashes, no change in skin color and pigmentation and no skin lumps.   Neurological: no headaches, no " seizures, no numbness, no lateralizing deficits and no fainting.   Psychiatric: no depression and no anxiety.   Urine: denies polyuria, hematuria, dysuria      Allergies   Allergen Reactions    Finasteride Unknown    Morphine Unknown    Penicillins Unknown    Valacyclovir Rash       Current Outpatient Medications   Medication Sig Dispense Refill    Accu-Chek Pippa Plus test strp strip USE TO TEST ONCE DAILY.      Accu-Chek Fastclix Lancet Drum misc 1 Lancet early in the morning..      albuterol 90 mcg/actuation inhaler Inhale 2 puffs every 4 hours if needed.      amLODIPine (Norvasc) 10 mg tablet Take 1 tablet (10 mg) by mouth once daily. 90 tablet 3    ascorbic acid/bioflavonoids (AYO-C PO) Take by mouth.      aspirin 81 mg EC tablet Take 1 tablet (81 mg) by mouth once daily.      atorvastatin (Lipitor) 40 mg tablet 1 tablet (40 mg).      benzonatate (Tessalon) 200 mg capsule Take 1 capsule (200 mg) by mouth 3 times a day as needed.      biotin 1 mg capsule Take by mouth.      calcium carbonate-vit D3-min 600 mg calcium- 400 unit tablet Take by mouth.      calcium carbonate-vitamin D3 (Caltrate with Vitamin D3) 600 mg-20 mcg (800 unit) tablet Take 1 tablet by mouth once daily. With breakfast      cholecalciferol (Vitamin D-3) 50 mcg (2,000 unit) capsule Take by mouth.      clindamycin (Cleocin) 300 mg capsule Take 1 capsule (300 mg) by mouth. 1 HOUR BEFORE PROCEDURE AND 6 HOURS AFTER PROCEDURE AS DIRECTED      coenzyme Q-10 300 mg capsule capsule Take by mouth.      colchicine 0.6 mg tablet Take 1 tablet (0.6 mg) by mouth twice a day.      cyanocobalamin (Vitamin B-12) 100 mcg tablet Take by mouth.      cyclobenzaprine (Flexeril) 10 mg tablet Take 1 tablet (10 mg) by mouth 3 times a day as needed.      diphenoxylate-atropine (Lomotil) 2.5-0.025 mg tablet Take 1 tablet by mouth once daily. With additional tablet in evening if needed 30 tablet 2    eye vitamin supplement (Ocuvite Eye Health Formula) capsule Take 1  tablet by mouth once daily.      famotidine (Pepcid) 40 mg tablet Take 1 tablet (40 mg) by mouth 2 times a day. 180 tablet 3    finasteride (Proscar) 5 mg tablet Take 1 tablet (5 mg) by mouth once daily.      glimepiride (Amaryl) 1 mg tablet Take by mouth.      glucosamine-D3-Boswellia serr 1,500-400-100 mg-unit-mg tablet Take by mouth.      glucosamine/chondr staley A sod (OSTEO BI-FLEX ORAL) Take 2 tablets by mouth once daily with a meal.      hydroCHLOROthiazide (HYDRODiuril) 25 mg tablet Take 1 tablet (25 mg) by mouth once daily. 90 tablet 3    L.acid,ferm,aleksander,rha-B.bif,long (Controlled Delivery Probiotic) 126 mg (2 billion cell) tablet,delayed and ext.release Take by mouth.      lactobacillus acidophilus capsule Take 1 capsule by mouth once daily. With breakfast      levothyroxine (Synthroid, Levoxyl) 50 mcg tablet Take by mouth.      Livalo 2 mg tablet       losartan (Cozaar) 50 mg tablet Take 1 tablet (50 mg) by mouth once daily.      losartan-hydrochlorothiazide (Hyzaar) 50-12.5 mg tablet       meclizine (Antivert) 25 mg tablet Take 1 tablet (25 mg) by mouth 3 times a day.      metoprolol tartrate (Lopressor) 25 mg tablet Take 1 tablet (25 mg) by mouth 2 times a day.      multivit-min/folic/vit K/lycop (ONE-A-DAY MEN'S MULTIVITAMIN ORAL) Take by mouth. VitaCraves Oral Tablet Chewable      multivitamin capsule Take by mouth.      qj-oj-iy9-dha-epa-fish-lut-livan (Ocuvite Adult 50 Plus) 250 mg (90 mg-160 mg) capsule Take by mouth.      naproxen (Naprosyn) 500 mg tablet Take 1 tablet (500 mg) by mouth 2 times a day as needed.      nitroglycerin (Nitrostat) 0.4 mg SL tablet Place 1 tablet (0.4 mg) under the tongue. Under the tongue as needed for chest pain      omega 3-dha-epa-fish oil (Fish OiL) 1,200 (144-216) mg capsule Take by mouth.      potassium chloride CR (K-Tab) 20 mEq ER tablet Take by mouth.      predniSONE (Deltasone) 5 mg tablet 1 tablet (5 mg).      psyllium (Metamucil) powder Take by mouth.       psyllium (Metamucil, with sugar,) 3.4 gram packet Take by mouth.      psyllium husk, aspartame, (Metamucil Sugar-Free, aspart,) 3.4 gram/5.8 gram powder Take 3.4 g by mouth.      rosuvastatin (Crestor) 20 mg tablet Take 1 tablet (20 mg) by mouth once daily. 90 tablet 3    sodium chloride (Ocean Nasal) 0.65 % nasal spray Administer 2 sprays into each nostril 4 times a day as needed (dry nasal passages).      TURMERIC ORAL Take 500 mg by mouth once daily with a meal.      ubidecarenone/vitamin E (COENZYME Q10-VITAMIN E ORAL) Take by mouth.      vits A,C,E/lutein/minerals (OCUVITE WITH LUTEIN ORAL) Take 1 tablet by mouth once daily with a meal.       No current facility-administered medications for this visit.       Objective     No visits with results within 4 Month(s) from this visit.   Latest known visit with results is:   Orders Only on 03/13/2023   Component Date Value Ref Range Status    DRUG SCREEN COMMENT URINE 03/13/2023 SEE BELOW   Final    Creatine, Urine 03/13/2023 127.8  mg/dL Final    Amphetamine Screen, Urine 03/13/2023 PRESUMPTIVE NEGATIVE  NEGATIVE Final    Barbiturate Screen, Urine 03/13/2023 PRESUMPTIVE NEGATIVE  NEGATIVE Final    Cannabinoid Screen, Urine 03/13/2023 PRESUMPTIVE NEGATIVE  NEGATIVE Final    Cocaine Screen, Urine 03/13/2023 PRESUMPTIVE NEGATIVE  NEGATIVE Final    PCP Screen, Urine 03/13/2023 PRESUMPTIVE NEGATIVE  NEGATIVE Final    7-Aminoclonazepam 03/13/2023 <25  Cutoff <25 ng/mL Final    Alpha-Hydroxyalprazolam 03/13/2023 <25  Cutoff <25 ng/mL Final    Alpha-Hydroxymidazolam 03/13/2023 <25  Cutoff <25 ng/mL Final    Alprazolam 03/13/2023 <25  Cutoff <25 ng/mL Final    Chlordiazepoxide 03/13/2023 <25  Cutoff <25 ng/mL Final    Clonazepam 03/13/2023 <25  Cutoff <25 ng/mL Final    Diazepam 03/13/2023 <25  Cutoff <25 ng/mL Final    Lorazepam 03/13/2023 <25  Cutoff <25 ng/mL Final    Midazolam 03/13/2023 <25  Cutoff <25 ng/mL Final    Nordiazepam 03/13/2023 <25  Cutoff <25 ng/mL Final     Oxazepam 03/13/2023 <25  Cutoff <25 ng/mL Final    Temazepam 03/13/2023 <25  Cutoff <25 ng/mL Final    Zolpidem 03/13/2023 <25  Cutoff <25 ng/mL Final    Zolpidem Metabolite (ZCA) 03/13/2023 <25  Cutoff <25 ng/mL Final    6-Acetylmorphine 03/13/2023 <25  Cutoff <25 ng/mL Final    Codeine 03/13/2023 <50  Cutoff <50 ng/mL Final    Hydrocodone 03/13/2023 <25  Cutoff <25 ng/mL Final    Hydromorphone 03/13/2023 <25  Cutoff <25 ng/mL Final    Morphine Urine 03/13/2023 <50  Cutoff <50 ng/mL Final    Norhydrocodone 03/13/2023 <25  Cutoff <25 ng/mL Final    Noroxycodone 03/13/2023 <25  Cutoff <25 ng/mL Final    Oxycodone 03/13/2023 <25  Cutoff <25 ng/mL Final    Oxymorphone 03/13/2023 <25  Cutoff <25 ng/mL Final    Tramadol 03/13/2023 <50  Cutoff <50 ng/mL Final    O-Desmethyltramadol 03/13/2023 <50  Cutoff <50 ng/mL Final    Fentanyl 03/13/2023 <2.5  Cutoff<2.5 ng/mL Final    Norfentanyl 03/13/2023 <2.5  Cutoff<2.5 ng/mL Final    METHADONE CONFIRMATION,URINE 03/13/2023 <25  Cutoff <25 ng/mL Final    EDDP 03/13/2023 <25  Cutoff <25 ng/mL Final       Radiology: Reviewed imaging in powerchart.  No results found.    Family History   Problem Relation Name Age of Onset    Stroke Mother      Heart failure Mother      Other (hepatic cirrhosis) Father      Parkinsonism Brother       Social History     Socioeconomic History    Marital status:      Spouse name: None    Number of children: None    Years of education: None    Highest education level: None   Occupational History    None   Tobacco Use    Smoking status: Never    Smokeless tobacco: Never   Substance and Sexual Activity    Alcohol use: Never    Drug use: Never    Sexual activity: None   Other Topics Concern    None   Social History Narrative    None     Social Determinants of Health     Financial Resource Strain: Not on file   Food Insecurity: Not on file   Transportation Needs: Not on file   Physical Activity: Not on file   Stress: Not on file   Social Connections:  Not on file   Intimate Partner Violence: Not on file   Housing Stability: Not on file     Past Medical History:   Diagnosis Date    Abnormal result of other cardiovascular function study     Abnormal nuclear stress test    Acute ischemic heart disease, unspecified (CMS/HCC)     ACS (acute coronary syndrome)    Atherosclerotic heart disease of native coronary artery without angina pectoris     Arteriosclerotic coronary artery disease    Chronic sinusitis, unspecified     Chronic congestion of paranasal sinus    Coronary angioplasty status     History of percutaneous coronary intervention    Encounter for preprocedural cardiovascular examination     Pre-operative cardiovascular examination    Encounter for screening for malignant neoplasm of prostate     Screening PSA (prostate specific antigen)    Lyme disease, unspecified 12/02/2013    Lyme disease    Other cervical disc displacement, unspecified cervical region     Herniated cervical disc without myelopathy    Other specified postprocedural states     History of carpal tunnel surgery    Other systemic sclerosis (CMS/HCC)     Cutaneous scleroderma    Personal history of malignant neoplasm, unspecified     History of malignant neoplasm    Personal history of other diseases of the circulatory system     History of hypertension    Personal history of other diseases of the circulatory system     History of abnormal electrocardiography    Personal history of other diseases of the digestive system     History of gastroesophageal reflux (GERD)    Personal history of other diseases of the musculoskeletal system and connective tissue     History of low back pain    Personal history of other diseases of the musculoskeletal system and connective tissue     History of spinal stenosis    Personal history of other diseases of the musculoskeletal system and connective tissue     History of arthritis    Personal history of other endocrine, nutritional and metabolic disease      "History of hypothyroidism    Personal history of other endocrine, nutritional and metabolic disease     History of hyperlipidemia    Personal history of other endocrine, nutritional and metabolic disease     History of type 2 diabetes mellitus    Personal history of other endocrine, nutritional and metabolic disease     History of hypoglycemia    Personal history of other specified conditions     History of chest pain    Polyp of stomach and duodenum     Gastric polyp     Past Surgical History:   Procedure Laterality Date    CORONARY ANGIOPLASTY WITH STENT PLACEMENT  07/27/2018    Cath Placement Of Stent 1    HAND SURGERY  07/27/2018    Hand Surgery                                                                                                                                                          OTHER SURGICAL HISTORY  07/27/2018    Surgery    OTHER SURGICAL HISTORY  07/27/2018    Total Hip Replacement Left       Charting was completed using voice recognition technology and may include unintended errors.     Subjective   Patient ID: Curtis Brown is a 82 y.o. male who presents for the following    Assessment/Plan       HPI      Visit Vitals  /60   Pulse 73   Temp 37.2 °C (98.9 °F)   Ht 1.727 m (5' 8\")   Wt 87.2 kg (192 lb 3.2 oz)   SpO2 97%   BMI 29.22 kg/m²   Smoking Status Never   BSA 2.05 m²     PHYSICAL EXAM       REVIEW OF SYSTEMS       Allergies   Allergen Reactions    Finasteride Unknown    Morphine Unknown    Penicillins Unknown    Valacyclovir Rash       Current Outpatient Medications   Medication Sig Dispense Refill    Accu-Chek Pippa Plus test strp strip USE TO TEST ONCE DAILY.      Accu-Chek Fastclix Lancet Drum misc 1 Lancet early in the morning..      albuterol 90 mcg/actuation inhaler Inhale 2 puffs every 4 hours if needed.      amLODIPine (Norvasc) 10 mg tablet Take 1 tablet (10 mg) by mouth once daily. 90 tablet 3    ascorbic acid/bioflavonoids (AYO-C PO) Take by mouth.      aspirin " 81 mg EC tablet Take 1 tablet (81 mg) by mouth once daily.      atorvastatin (Lipitor) 40 mg tablet 1 tablet (40 mg).      benzonatate (Tessalon) 200 mg capsule Take 1 capsule (200 mg) by mouth 3 times a day as needed.      biotin 1 mg capsule Take by mouth.      calcium carbonate-vit D3-min 600 mg calcium- 400 unit tablet Take by mouth.      calcium carbonate-vitamin D3 (Caltrate with Vitamin D3) 600 mg-20 mcg (800 unit) tablet Take 1 tablet by mouth once daily. With breakfast      cholecalciferol (Vitamin D-3) 50 mcg (2,000 unit) capsule Take by mouth.      clindamycin (Cleocin) 300 mg capsule Take 1 capsule (300 mg) by mouth. 1 HOUR BEFORE PROCEDURE AND 6 HOURS AFTER PROCEDURE AS DIRECTED      coenzyme Q-10 300 mg capsule capsule Take by mouth.      colchicine 0.6 mg tablet Take 1 tablet (0.6 mg) by mouth twice a day.      cyanocobalamin (Vitamin B-12) 100 mcg tablet Take by mouth.      cyclobenzaprine (Flexeril) 10 mg tablet Take 1 tablet (10 mg) by mouth 3 times a day as needed.      diphenoxylate-atropine (Lomotil) 2.5-0.025 mg tablet Take 1 tablet by mouth once daily. With additional tablet in evening if needed 30 tablet 2    eye vitamin supplement (Ocuvite Eye Health Formula) capsule Take 1 tablet by mouth once daily.      famotidine (Pepcid) 40 mg tablet Take 1 tablet (40 mg) by mouth 2 times a day. 180 tablet 3    finasteride (Proscar) 5 mg tablet Take 1 tablet (5 mg) by mouth once daily.      glimepiride (Amaryl) 1 mg tablet Take by mouth.      glucosamine-D3-Boswellia serr 1,500-400-100 mg-unit-mg tablet Take by mouth.      glucosamine/chondr staley A sod (OSTEO BI-FLEX ORAL) Take 2 tablets by mouth once daily with a meal.      hydroCHLOROthiazide (HYDRODiuril) 25 mg tablet Take 1 tablet (25 mg) by mouth once daily. 90 tablet 3    L.acid,ferm,aleksander,rha-B.bif,long (Controlled Delivery Probiotic) 126 mg (2 billion cell) tablet,delayed and ext.release Take by mouth.      lactobacillus acidophilus capsule Take 1  capsule by mouth once daily. With breakfast      levothyroxine (Synthroid, Levoxyl) 50 mcg tablet Take by mouth.      Livalo 2 mg tablet       losartan (Cozaar) 50 mg tablet Take 1 tablet (50 mg) by mouth once daily.      losartan-hydrochlorothiazide (Hyzaar) 50-12.5 mg tablet       meclizine (Antivert) 25 mg tablet Take 1 tablet (25 mg) by mouth 3 times a day.      metoprolol tartrate (Lopressor) 25 mg tablet Take 1 tablet (25 mg) by mouth 2 times a day.      multivit-min/folic/vit K/lycop (ONE-A-DAY MEN'S MULTIVITAMIN ORAL) Take by mouth. VitaCraves Oral Tablet Chewable      multivitamin capsule Take by mouth.      hm-gd-tq2-dha-epa-fish-lut-livan (Ocuvite Adult 50 Plus) 250 mg (90 mg-160 mg) capsule Take by mouth.      naproxen (Naprosyn) 500 mg tablet Take 1 tablet (500 mg) by mouth 2 times a day as needed.      nitroglycerin (Nitrostat) 0.4 mg SL tablet Place 1 tablet (0.4 mg) under the tongue. Under the tongue as needed for chest pain      omega 3-dha-epa-fish oil (Fish OiL) 1,200 (144-216) mg capsule Take by mouth.      potassium chloride CR (K-Tab) 20 mEq ER tablet Take by mouth.      predniSONE (Deltasone) 5 mg tablet 1 tablet (5 mg).      psyllium (Metamucil) powder Take by mouth.      psyllium (Metamucil, with sugar,) 3.4 gram packet Take by mouth.      psyllium husk, aspartame, (Metamucil Sugar-Free, aspart,) 3.4 gram/5.8 gram powder Take 3.4 g by mouth.      rosuvastatin (Crestor) 20 mg tablet Take 1 tablet (20 mg) by mouth once daily. 90 tablet 3    sodium chloride (Ocean Nasal) 0.65 % nasal spray Administer 2 sprays into each nostril 4 times a day as needed (dry nasal passages).      TURMERIC ORAL Take 500 mg by mouth once daily with a meal.      ubidecarenone/vitamin E (COENZYME Q10-VITAMIN E ORAL) Take by mouth.      vits A,C,E/lutein/minerals (OCUVITE WITH LUTEIN ORAL) Take 1 tablet by mouth once daily with a meal.       No current facility-administered medications for this visit.       Objective      No visits with results within 4 Month(s) from this visit.   Latest known visit with results is:   Orders Only on 03/13/2023   Component Date Value Ref Range Status    DRUG SCREEN COMMENT URINE 03/13/2023 SEE BELOW   Final    Creatine, Urine 03/13/2023 127.8  mg/dL Final    Amphetamine Screen, Urine 03/13/2023 PRESUMPTIVE NEGATIVE  NEGATIVE Final    Barbiturate Screen, Urine 03/13/2023 PRESUMPTIVE NEGATIVE  NEGATIVE Final    Cannabinoid Screen, Urine 03/13/2023 PRESUMPTIVE NEGATIVE  NEGATIVE Final    Cocaine Screen, Urine 03/13/2023 PRESUMPTIVE NEGATIVE  NEGATIVE Final    PCP Screen, Urine 03/13/2023 PRESUMPTIVE NEGATIVE  NEGATIVE Final    7-Aminoclonazepam 03/13/2023 <25  Cutoff <25 ng/mL Final    Alpha-Hydroxyalprazolam 03/13/2023 <25  Cutoff <25 ng/mL Final    Alpha-Hydroxymidazolam 03/13/2023 <25  Cutoff <25 ng/mL Final    Alprazolam 03/13/2023 <25  Cutoff <25 ng/mL Final    Chlordiazepoxide 03/13/2023 <25  Cutoff <25 ng/mL Final    Clonazepam 03/13/2023 <25  Cutoff <25 ng/mL Final    Diazepam 03/13/2023 <25  Cutoff <25 ng/mL Final    Lorazepam 03/13/2023 <25  Cutoff <25 ng/mL Final    Midazolam 03/13/2023 <25  Cutoff <25 ng/mL Final    Nordiazepam 03/13/2023 <25  Cutoff <25 ng/mL Final    Oxazepam 03/13/2023 <25  Cutoff <25 ng/mL Final    Temazepam 03/13/2023 <25  Cutoff <25 ng/mL Final    Zolpidem 03/13/2023 <25  Cutoff <25 ng/mL Final    Zolpidem Metabolite (ZCA) 03/13/2023 <25  Cutoff <25 ng/mL Final    6-Acetylmorphine 03/13/2023 <25  Cutoff <25 ng/mL Final    Codeine 03/13/2023 <50  Cutoff <50 ng/mL Final    Hydrocodone 03/13/2023 <25  Cutoff <25 ng/mL Final    Hydromorphone 03/13/2023 <25  Cutoff <25 ng/mL Final    Morphine Urine 03/13/2023 <50  Cutoff <50 ng/mL Final    Norhydrocodone 03/13/2023 <25  Cutoff <25 ng/mL Final    Noroxycodone 03/13/2023 <25  Cutoff <25 ng/mL Final    Oxycodone 03/13/2023 <25  Cutoff <25 ng/mL Final    Oxymorphone 03/13/2023 <25  Cutoff <25 ng/mL Final    Tramadol  03/13/2023 <50  Cutoff <50 ng/mL Final    O-Desmethyltramadol 03/13/2023 <50  Cutoff <50 ng/mL Final    Fentanyl 03/13/2023 <2.5  Cutoff<2.5 ng/mL Final    Norfentanyl 03/13/2023 <2.5  Cutoff<2.5 ng/mL Final    METHADONE CONFIRMATION,URINE 03/13/2023 <25  Cutoff <25 ng/mL Final    EDDP 03/13/2023 <25  Cutoff <25 ng/mL Final       Radiology: Reviewed imaging in powerchart.  No results found.    Family History   Problem Relation Name Age of Onset    Stroke Mother      Heart failure Mother      Other (hepatic cirrhosis) Father      Parkinsonism Brother       Social History     Socioeconomic History    Marital status:      Spouse name: None    Number of children: None    Years of education: None    Highest education level: None   Occupational History    None   Tobacco Use    Smoking status: Never    Smokeless tobacco: Never   Substance and Sexual Activity    Alcohol use: Never    Drug use: Never    Sexual activity: None   Other Topics Concern    None   Social History Narrative    None     Social Determinants of Health     Financial Resource Strain: Not on file   Food Insecurity: Not on file   Transportation Needs: Not on file   Physical Activity: Not on file   Stress: Not on file   Social Connections: Not on file   Intimate Partner Violence: Not on file   Housing Stability: Not on file     Past Medical History:   Diagnosis Date    Abnormal result of other cardiovascular function study     Abnormal nuclear stress test    Acute ischemic heart disease, unspecified (CMS/HCC)     ACS (acute coronary syndrome)    Atherosclerotic heart disease of native coronary artery without angina pectoris     Arteriosclerotic coronary artery disease    Chronic sinusitis, unspecified     Chronic congestion of paranasal sinus    Coronary angioplasty status     History of percutaneous coronary intervention    Encounter for preprocedural cardiovascular examination     Pre-operative cardiovascular examination    Encounter for screening  for malignant neoplasm of prostate     Screening PSA (prostate specific antigen)    Lyme disease, unspecified 12/02/2013    Lyme disease    Other cervical disc displacement, unspecified cervical region     Herniated cervical disc without myelopathy    Other specified postprocedural states     History of carpal tunnel surgery    Other systemic sclerosis (CMS/HCC)     Cutaneous scleroderma    Personal history of malignant neoplasm, unspecified     History of malignant neoplasm    Personal history of other diseases of the circulatory system     History of hypertension    Personal history of other diseases of the circulatory system     History of abnormal electrocardiography    Personal history of other diseases of the digestive system     History of gastroesophageal reflux (GERD)    Personal history of other diseases of the musculoskeletal system and connective tissue     History of low back pain    Personal history of other diseases of the musculoskeletal system and connective tissue     History of spinal stenosis    Personal history of other diseases of the musculoskeletal system and connective tissue     History of arthritis    Personal history of other endocrine, nutritional and metabolic disease     History of hypothyroidism    Personal history of other endocrine, nutritional and metabolic disease     History of hyperlipidemia    Personal history of other endocrine, nutritional and metabolic disease     History of type 2 diabetes mellitus    Personal history of other endocrine, nutritional and metabolic disease     History of hypoglycemia    Personal history of other specified conditions     History of chest pain    Polyp of stomach and duodenum     Gastric polyp     Past Surgical History:   Procedure Laterality Date    CORONARY ANGIOPLASTY WITH STENT PLACEMENT  07/27/2018    Cath Placement Of Stent 1    HAND SURGERY  07/27/2018    Hand Surgery                                                                                                                                                           OTHER SURGICAL HISTORY  07/27/2018    Surgery    OTHER SURGICAL HISTORY  07/27/2018    Total Hip Replacement Left       Charting was completed using voice recognition technology and may include unintended errors.

## 2023-10-09 ENCOUNTER — OFFICE VISIT (OUTPATIENT)
Dept: CARDIOLOGY | Facility: CLINIC | Age: 83
End: 2023-10-09
Payer: MEDICARE

## 2023-10-09 ENCOUNTER — HOSPITAL ENCOUNTER (OUTPATIENT)
Dept: CARDIOLOGY | Facility: CLINIC | Age: 83
Discharge: HOME | End: 2023-10-09
Payer: MEDICARE

## 2023-10-09 VITALS
SYSTOLIC BLOOD PRESSURE: 144 MMHG | DIASTOLIC BLOOD PRESSURE: 70 MMHG | HEIGHT: 68 IN | HEART RATE: 68 BPM | OXYGEN SATURATION: 98 % | BODY MASS INDEX: 30.13 KG/M2 | WEIGHT: 198.8 LBS

## 2023-10-09 DIAGNOSIS — Z95.5 PRESENCE OF STENT IN CORONARY ARTERY: ICD-10-CM

## 2023-10-09 DIAGNOSIS — I25.10 ATHSCL HEART DISEASE OF NATIVE CORONARY ARTERY W/O ANG PCTRS: ICD-10-CM

## 2023-10-09 DIAGNOSIS — I31.9 PERICARDITIS, UNSPECIFIED CHRONICITY, UNSPECIFIED TYPE (HHS-HCC): ICD-10-CM

## 2023-10-09 DIAGNOSIS — I35.0 NONRHEUMATIC AORTIC (VALVE) STENOSIS: ICD-10-CM

## 2023-10-09 DIAGNOSIS — Z95.5 PRESENCE OF STENT IN CORONARY ARTERY: Primary | ICD-10-CM

## 2023-10-09 DIAGNOSIS — I10 PRIMARY HYPERTENSION: ICD-10-CM

## 2023-10-09 DIAGNOSIS — I35.0 NONRHEUMATIC AORTIC VALVE STENOSIS: Primary | ICD-10-CM

## 2023-10-09 DIAGNOSIS — E78.00 PURE HYPERCHOLESTEROLEMIA: ICD-10-CM

## 2023-10-09 LAB — EJECTION FRACTION APICAL 4 CHAMBER: 57.5

## 2023-10-09 PROCEDURE — 99215 OFFICE O/P EST HI 40 MIN: CPT | Performed by: INTERNAL MEDICINE

## 2023-10-09 PROCEDURE — 93306 TTE W/DOPPLER COMPLETE: CPT

## 2023-10-09 PROCEDURE — 1160F RVW MEDS BY RX/DR IN RCRD: CPT | Performed by: INTERNAL MEDICINE

## 2023-10-09 PROCEDURE — 3077F SYST BP >= 140 MM HG: CPT | Performed by: INTERNAL MEDICINE

## 2023-10-09 PROCEDURE — 3078F DIAST BP <80 MM HG: CPT | Performed by: INTERNAL MEDICINE

## 2023-10-09 PROCEDURE — 1159F MED LIST DOCD IN RCRD: CPT | Performed by: INTERNAL MEDICINE

## 2023-10-09 PROCEDURE — 93306 TTE W/DOPPLER COMPLETE: CPT | Performed by: INTERNAL MEDICINE

## 2023-10-09 PROCEDURE — 93000 ELECTROCARDIOGRAM COMPLETE: CPT | Performed by: INTERNAL MEDICINE

## 2023-10-09 PROCEDURE — 1036F TOBACCO NON-USER: CPT | Performed by: INTERNAL MEDICINE

## 2023-10-09 RX ORDER — NITROGLYCERIN 0.4 MG/1
0.4 TABLET SUBLINGUAL EVERY 5 MIN PRN
Qty: 25 TABLET | Refills: 2 | Status: SHIPPED | OUTPATIENT
Start: 2023-10-09

## 2023-10-09 ASSESSMENT — ENCOUNTER SYMPTOMS: CONSTITUTIONAL NEGATIVE: 1

## 2023-10-09 NOTE — PROGRESS NOTES
No dyspnea, no chest pain, no palpitiatons, no edema.  He has dry macular dgeneration      Review of Systems   Constitutional: Negative.         Physical Exam  Constitutional:       Appearance: Normal appearance.   HENT:      Head: Normocephalic.   Eyes:      Pupils: Pupils are equal, round, and reactive to light.   Cardiovascular:      Rate and Rhythm: Regular rhythm.      Heart sounds: Murmur heard.   Pulmonary:      Breath sounds: Normal breath sounds.   Musculoskeletal:         General: No swelling.          Problem List Items Addressed This Visit          Cardiac and Vasculature    AS (aortic stenosis) - Primary    Current Assessment & Plan     10/9/23 echocardiogram moderate to severe aortic stenosis, mild to moderate AI, LVEF=60-65%, TR with normal RVSP (reveiwed tdoay)         Athscl heart disease of native coronary artery w/o ang pctrs    Pericarditis    Current Assessment & Plan     Admitted [facility]  9/4-/7 22 resolved with treatment         Presence of stent in coronary artery    Pure hypercholesterolemia    Current Assessment & Plan     Remote PCI of RCA with 10/18/17 catheterizations showing patent stent         Hypertension

## 2023-10-25 ENCOUNTER — OFFICE VISIT (OUTPATIENT)
Dept: PRIMARY CARE | Facility: CLINIC | Age: 83
End: 2023-10-25
Payer: MEDICARE

## 2023-10-25 VITALS
HEART RATE: 82 BPM | HEIGHT: 68 IN | TEMPERATURE: 97 F | DIASTOLIC BLOOD PRESSURE: 58 MMHG | SYSTOLIC BLOOD PRESSURE: 108 MMHG | BODY MASS INDEX: 30.23 KG/M2 | OXYGEN SATURATION: 97 %

## 2023-10-25 DIAGNOSIS — R97.20 ELEVATED PROSTATE SPECIFIC ANTIGEN LESS THAN 10 NG/ML: ICD-10-CM

## 2023-10-25 DIAGNOSIS — Z00.00 WELLNESS EXAMINATION: Primary | ICD-10-CM

## 2023-10-25 DIAGNOSIS — I50.32 CHRONIC DIASTOLIC CONGESTIVE HEART FAILURE (MULTI): ICD-10-CM

## 2023-10-25 DIAGNOSIS — K21.9 GASTROESOPHAGEAL REFLUX DISEASE, UNSPECIFIED WHETHER ESOPHAGITIS PRESENT: ICD-10-CM

## 2023-10-25 DIAGNOSIS — I15.9 SECONDARY HYPERTENSION: ICD-10-CM

## 2023-10-25 DIAGNOSIS — N40.1 BENIGN PROSTATIC HYPERPLASIA WITH URINARY FREQUENCY: ICD-10-CM

## 2023-10-25 DIAGNOSIS — E11.9 TYPE 2 DIABETES MELLITUS WITHOUT COMPLICATION, WITHOUT LONG-TERM CURRENT USE OF INSULIN (MULTI): ICD-10-CM

## 2023-10-25 DIAGNOSIS — R35.0 BENIGN PROSTATIC HYPERPLASIA WITH URINARY FREQUENCY: ICD-10-CM

## 2023-10-25 PROCEDURE — 1170F FXNL STATUS ASSESSED: CPT | Performed by: INTERNAL MEDICINE

## 2023-10-25 PROCEDURE — 99214 OFFICE O/P EST MOD 30 MIN: CPT | Performed by: INTERNAL MEDICINE

## 2023-10-25 PROCEDURE — G0439 PPPS, SUBSEQ VISIT: HCPCS | Performed by: INTERNAL MEDICINE

## 2023-10-25 PROCEDURE — 1160F RVW MEDS BY RX/DR IN RCRD: CPT | Performed by: INTERNAL MEDICINE

## 2023-10-25 PROCEDURE — 3074F SYST BP LT 130 MM HG: CPT | Performed by: INTERNAL MEDICINE

## 2023-10-25 PROCEDURE — 1159F MED LIST DOCD IN RCRD: CPT | Performed by: INTERNAL MEDICINE

## 2023-10-25 PROCEDURE — 1036F TOBACCO NON-USER: CPT | Performed by: INTERNAL MEDICINE

## 2023-10-25 PROCEDURE — 3078F DIAST BP <80 MM HG: CPT | Performed by: INTERNAL MEDICINE

## 2023-10-25 RX ORDER — HYDROCHLOROTHIAZIDE 25 MG/1
25 TABLET ORAL DAILY
Qty: 90 TABLET | Refills: 3 | Status: SHIPPED | OUTPATIENT
Start: 2023-10-25 | End: 2023-10-27 | Stop reason: SDUPTHER

## 2023-10-25 RX ORDER — PRAVASTATIN SODIUM 20 MG/1
20 TABLET ORAL DAILY
COMMUNITY
End: 2024-04-11 | Stop reason: WASHOUT

## 2023-10-25 RX ORDER — PREDNISONE 10 MG/1
10 TABLET ORAL DAILY
COMMUNITY
Start: 2023-10-16 | End: 2024-04-11 | Stop reason: WASHOUT

## 2023-10-25 RX ORDER — FAMOTIDINE 40 MG/1
40 TABLET, FILM COATED ORAL 2 TIMES DAILY
Qty: 180 TABLET | Refills: 3 | Status: SHIPPED | OUTPATIENT
Start: 2023-10-25 | End: 2023-10-27 | Stop reason: SDUPTHER

## 2023-10-25 RX ORDER — AMLODIPINE BESYLATE 10 MG/1
10 TABLET ORAL DAILY
Qty: 90 TABLET | Refills: 3 | Status: SHIPPED | OUTPATIENT
Start: 2023-10-25 | End: 2023-10-27 | Stop reason: SDUPTHER

## 2023-10-25 RX ORDER — FINASTERIDE 5 MG/1
5 TABLET, FILM COATED ORAL DAILY
Qty: 90 TABLET | Refills: 3 | Status: SHIPPED | OUTPATIENT
Start: 2023-10-25 | End: 2023-10-25 | Stop reason: SDUPTHER

## 2023-10-25 RX ORDER — FINASTERIDE 5 MG/1
5 TABLET, FILM COATED ORAL DAILY
Qty: 90 TABLET | Refills: 3 | Status: SHIPPED | OUTPATIENT
Start: 2023-10-25

## 2023-10-25 ASSESSMENT — PATIENT HEALTH QUESTIONNAIRE - PHQ9
2. FEELING DOWN, DEPRESSED OR HOPELESS: NOT AT ALL
1. LITTLE INTEREST OR PLEASURE IN DOING THINGS: NOT AT ALL
SUM OF ALL RESPONSES TO PHQ9 QUESTIONS 1 AND 2: 0

## 2023-10-25 ASSESSMENT — ACTIVITIES OF DAILY LIVING (ADL)
DOING_HOUSEWORK: INDEPENDENT
MANAGING_FINANCES: INDEPENDENT
DRESSING: INDEPENDENT
TAKING_MEDICATION: INDEPENDENT
BATHING: INDEPENDENT
GROCERY_SHOPPING: INDEPENDENT

## 2023-10-25 NOTE — PROGRESS NOTES
Subjective   Patient ID: Curtis Brown is a 82 y.o. male who presents for the following    MEDICARE WELLNESS 10/25/2023 and the following    Controlled refill 9/2023  Assessment/Plan   lymphocytic colitis diarrhea: stable on diphenoxylate. increase #30 (he just had this filled on 10/10. He iwll need to come back in a month    pericardial effusion: Patient no longer on colchicine and has follow up with dr montes     Hypothyroid: tsh wnl      htn: stable, continue metoprolol.  improved, continue on   hctz to 25mg daily ,   amlodipine 10mg daily.   Losartan 50mg daily      hld: stable, continue statin. LDL < 70. Patient wants to change Lipitor to Crestor per patient's request. (Patient went to Dr Rodrigues and will get pitavastatin)     Dry macular degeneration: stable following with Dr Andrew @ [facility]     dm2: very controlled, discussed treatment of low blood sugar. Patient follows with dr rodrigues, A1c 5.7 , 5 mg tablet Amaryl daily . Watch for low sugars. Follow up with endo      PVD: stable, continue statin and aspirin      diastolic CHF and severe aortic stenosis: stable, patient is very active. stable. getting echo q6hrs with dr montes soon      BPH: patient is taking finasteride. He denies any complaints and he needs a refill       patient does not have children. needs to consider POA outs      cmp A1c lipid panel ordered with Dr Rodrigues     hpi  male cad s/p stent 11/2013, PVD, htn, hld comes for the following     HX PERICARDITIS AND PERICARDIAL EFFUSION sEPTEMBER 2022: this has since resolved. and he follows with cardiology regularly for this     lymphocytic colitis (biopsy + May 18, 2021)diarrhea: patient is on diphenoxylate/atropine for his diarrhea on a regular basis. he was seen by Dr De La Paz (who has just recently retired). he is controlled on dipheoxylate-atropine. he does on occasion during the month require an additional dose. patient continues to do well on his current management      diastolic chf:  Patient denies any chest pain, angina or exertional dyspnea. patient denies any swelling to lower extremities. Patient follows with cardiology on a regular basis     echo from feb 2020 ef 60% with moderate to severe aortic valve stenosis and mild tr.      Diabetes Type 2: patient denies any low blood sugars. Patient is following with opthalmology on a yearly basis. Patient is taking glimepiride a1c 5s typically. patient following with dr christina.      hypothyroid: patient denies any hypo or hypothyroid symptoms. patient denies any palpitations, diarrhea or constipation     HLD: Patient denies any muscle aches and is tolerating statin therapy     pvd: denies any previous strokes, no headaches. no claudication     spinal stenosis: pain intermittent, in gluteal region     social: patient denies any smoking, drug or alcohol abuse     surgical history: left hip replacement 11/2017     COLONOSCOPY MAY 2021 AT [facility] with 5 year follow up      HOSPITALIZATIONS   September 3 to September 7, 2022 @[facility] . Patient came in originally to [facility] for shortness of breath and chest pain. Patient was found to have acute pericarditis along with moderate-sized pericardial effusion. He was placed on colchicine twice a day and his symptoms dramatically improved. Cardiology as well as endocrinology was following him and he was discharged on colchicine.     OARRS:  No data recorded  I have personally reviewed the OARRS report for Curtis Brown. I have considered the risks of abuse, dependence, addiction and diversion    Is the patient prescribed a combination of a benzodiazepine and opioid?  Yes, I feel it is clincially indicated to continue the medication and have discussed with the patient risks/benefits/alternatives.    Last Urine Drug Screen / ordered today: Yes  Recent Results (from the past 8760 hour(s))   OPIATE/OPIOID/BENZO PRESCRIPTION COMPLIANCE    Collection Time: 03/13/23   4:02 PM   Result Value Ref Range    DRUG SCREEN COMMENT URINE SEE BELOW     Creatine, Urine 127.8 mg/dL    Amphetamine Screen, Urine PRESUMPTIVE NEGATIVE NEGATIVE    Barbiturate Screen, Urine PRESUMPTIVE NEGATIVE NEGATIVE    Cannabinoid Screen, Urine PRESUMPTIVE NEGATIVE NEGATIVE    Cocaine Screen, Urine PRESUMPTIVE NEGATIVE NEGATIVE    PCP Screen, Urine PRESUMPTIVE NEGATIVE NEGATIVE    7-Aminoclonazepam <25 Cutoff <25 ng/mL    Alpha-Hydroxyalprazolam <25 Cutoff <25 ng/mL    Alpha-Hydroxymidazolam <25 Cutoff <25 ng/mL    Alprazolam <25 Cutoff <25 ng/mL    Chlordiazepoxide <25 Cutoff <25 ng/mL    Clonazepam <25 Cutoff <25 ng/mL    Diazepam <25 Cutoff <25 ng/mL    Lorazepam <25 Cutoff <25 ng/mL    Midazolam <25 Cutoff <25 ng/mL    Nordiazepam <25 Cutoff <25 ng/mL    Oxazepam <25 Cutoff <25 ng/mL    Temazepam <25 Cutoff <25 ng/mL    Zolpidem <25 Cutoff <25 ng/mL    Zolpidem Metabolite (ZCA) <25 Cutoff <25 ng/mL    6-Acetylmorphine <25 Cutoff <25 ng/mL    Codeine <50 Cutoff <50 ng/mL    Hydrocodone <25 Cutoff <25 ng/mL    Hydromorphone <25 Cutoff <25 ng/mL    Morphine Urine <50 Cutoff <50 ng/mL    Norhydrocodone <25 Cutoff <25 ng/mL    Noroxycodone <25 Cutoff <25 ng/mL    Oxycodone <25 Cutoff <25 ng/mL    Oxymorphone <25 Cutoff <25 ng/mL    Tramadol <50 Cutoff <50 ng/mL    O-Desmethyltramadol <50 Cutoff <50 ng/mL    Fentanyl <2.5 Cutoff<2.5 ng/mL    Norfentanyl <2.5 Cutoff<2.5 ng/mL    METHADONE CONFIRMATION,URINE <25 Cutoff <25 ng/mL    EDDP <25 Cutoff <25 ng/mL     Results are as expected.     Controlled Substance Agreement:  Date of the Last Agreement:   3/13/2023     Reviewed Controlled Substance Agreement including but not limited to the benefits, risks, and alternatives to treatment with a Controlled Substance medication(s).    Antidiarrheal:   What is the patient's goal of therapy?  Improve diarrhea   Is this being achieved with current treatment? yes  Is the patient currently prescribed an opioid, and/or  "benzodiazepine?   Yes, I feel it is clincially indicated to continue the medication and have discussed with the patient risks/benefits/alternatives.    Activities of Daily Living:   Is your overall impression that this patient is benefiting (symptom reduction outweighs side effects) from antidiarrheal therapy? No     1. Physical Functioning: Better  2. Family Relationship: Better  3. Social Relationship: Better  4. Mood: Better  5. Sleep Patterns: Better  6. Overall Function: Better      Visit Vitals  Pulse 82   Temp 36.1 °C (97 °F)   Ht 1.727 m (5' 8\")   SpO2 97%   BMI 30.23 kg/m²   Smoking Status Never   BSA 2.08 m²     PHYSICAL EXAM   General appearance: Alert and in no acute distress. speech is clear and coherent  No head trauma  Neck: no carotid bruits or thyromegaly. no lymphadenopathy   Respiratory : No respiratory distress, normal respiratory rhythm and effort. Clear bilateral breath sounds. No wheezing or rhonchi.   Cardiovascular: heart rate regular, S1, S2. no murmurs. no Lower extremity edema  Skin inspection: Normal skin color and pigmentation, normal skin turgor and no visible rash, induration, or cellulitis  MSK: 5/5 strength upper and lower extremities without gait abnormalities. no loss of muscle mass   Neuro: 2-12 CN grossly intact.  no slurred speech. no lateralizing deficit  Psychiatric Orientation: Oriented to person, place, and time. no depression, homicidal or suicidal thoughts, normal affect     REVIEW OF SYSTEMS     Constitutional: not feeling tired and no fever, chills or sweats. Denies weight loss  no headache  Cardiovascular: no exertional chest pain, no palpitations, no lower extremity edema and no intermittent leg claudication.   Lungs: Denies shortness of breath, exertional dyspnea, wheezing  Gastrointestinal: no change in bowel habits, no diarrhea, no nausea, no vomiting and no abdominal pain. Denies Melena, brbpr or dark stool  Musculoskeletal: no myalgias, no muscle weakness and no " limb swelling.   Skin: no rashes, no change in skin color and pigmentation and no skin lumps.   Neurological: no headaches, no seizures, no numbness, no lateralizing deficits and no fainting.   Psychiatric: no depression and no anxiety.   Urine: denies polyuria, hematuria, dysuria      Allergies   Allergen Reactions    Finasteride Unknown    Morphine Unknown    Penicillins Unknown    Valacyclovir Rash       Current Outpatient Medications   Medication Sig Dispense Refill    Accu-Chek Pippa Plus test strp strip USE TO TEST ONCE DAILY.      Accu-Chek Fastclix Lancet Drum misc 1 Lancet early in the morning..      amLODIPine (Norvasc) 10 mg tablet Take 1 tablet (10 mg) by mouth once daily. 90 tablet 3    aspirin 81 mg EC tablet Take 1 tablet (81 mg) by mouth once daily.      biotin 1 mg capsule Take by mouth.      calcium carbonate-vit D3-min 600 mg calcium- 400 unit tablet Take by mouth.      calcium carbonate-vitamin D3 (Caltrate with Vitamin D3) 600 mg-20 mcg (800 unit) tablet Take 1 tablet by mouth once daily. With breakfast      clindamycin (Cleocin) 300 mg capsule Take 1 capsule (300 mg) by mouth. 1 HOUR BEFORE PROCEDURE AND 6 HOURS AFTER PROCEDURE AS DIRECTED      coenzyme Q-10 300 mg capsule capsule Take by mouth.      colchicine 0.6 mg tablet Take 1 tablet (0.6 mg) by mouth twice a day.      cyanocobalamin (Vitamin B-12) 100 mcg tablet Take by mouth.      diphenoxylate-atropine (Lomotil) 2.5-0.025 mg tablet Take 1 tablet by mouth once daily. With additional tablet in evening if needed 30 tablet 2    eye vitamin supplement (Ocuvite Eye Health Formula) capsule Take 1 tablet by mouth once daily.      famotidine (Pepcid) 40 mg tablet Take 1 tablet (40 mg) by mouth 2 times a day. 180 tablet 3    finasteride (Proscar) 5 mg tablet Take 1 tablet (5 mg) by mouth once daily. 90 tablet 3    glimepiride (Amaryl) 1 mg tablet Take by mouth.      hydroCHLOROthiazide (HYDRODiuril) 25 mg tablet Take 1 tablet (25 mg) by mouth  once daily. 90 tablet 3    lactobacillus acidophilus capsule Take 1 capsule by mouth once daily. With breakfast      Livalo 2 mg tablet       losartan (Cozaar) 50 mg tablet Take 1 tablet (50 mg) by mouth once daily.      metoprolol tartrate (Lopressor) 25 mg tablet Take 1 tablet (25 mg) by mouth 2 times a day.      multivit-min/folic/vit K/lycop (ONE-A-DAY MEN'S MULTIVITAMIN ORAL) Take by mouth. VitaCraves Oral Tablet Chewable      multivitamin capsule Take by mouth.      nitroglycerin (Nitrostat) 0.4 mg SL tablet Place 1 tablet (0.4 mg) under the tongue every 5 minutes if needed for chest pain. Under the tongue as needed for chest pain 25 tablet 2    potassium chloride CR (K-Tab) 20 mEq ER tablet Take by mouth.      pravastatin (Pravachol) 20 mg tablet Take 1 tablet (20 mg) by mouth once daily.      predniSONE (Deltasone) 10 mg tablet Take 1 tablet (10 mg) by mouth once daily.      psyllium (Metamucil) powder Take by mouth.      ubidecarenone/vitamin E (COENZYME Q10-VITAMIN E ORAL) Take by mouth.      vits A,C,E/lutein/minerals (OCUVITE WITH LUTEIN ORAL) Take 1 tablet by mouth once daily with a meal.       No current facility-administered medications for this visit.       Objective     Hospital Outpatient Visit on 10/09/2023   Component Date Value Ref Range Status    LV A4C EF 10/09/2023 57.5   Final       Radiology: Reviewed imaging in powerchart.  No results found.    Family History   Problem Relation Name Age of Onset    Stroke Mother      Heart failure Mother      Other (hepatic cirrhosis) Father      Parkinsonism Brother       Social History     Socioeconomic History    Marital status:      Spouse name: None    Number of children: None    Years of education: None    Highest education level: None   Occupational History    None   Tobacco Use    Smoking status: Never    Smokeless tobacco: Never   Substance and Sexual Activity    Alcohol use: Never    Drug use: Never    Sexual activity: None   Other Topics  Concern    None   Social History Narrative    None     Social Determinants of Health     Financial Resource Strain: Not on file   Food Insecurity: Not on file   Transportation Needs: Not on file   Physical Activity: Not on file   Stress: Not on file   Social Connections: Not on file   Intimate Partner Violence: Not on file   Housing Stability: Not on file     Past Medical History:   Diagnosis Date    Abnormal result of other cardiovascular function study     Abnormal nuclear stress test    Acute ischemic heart disease, unspecified (CMS/HCC)     ACS (acute coronary syndrome)    Atherosclerotic heart disease of native coronary artery without angina pectoris     Arteriosclerotic coronary artery disease    Chronic sinusitis, unspecified     Chronic congestion of paranasal sinus    Coronary angioplasty status     History of percutaneous coronary intervention    Encounter for preprocedural cardiovascular examination     Pre-operative cardiovascular examination    Encounter for screening for malignant neoplasm of prostate     Screening PSA (prostate specific antigen)    Lyme disease, unspecified 12/02/2013    Lyme disease    Other cervical disc displacement, unspecified cervical region     Herniated cervical disc without myelopathy    Other specified postprocedural states     History of carpal tunnel surgery    Other systemic sclerosis (CMS/HCC)     Cutaneous scleroderma    Personal history of malignant neoplasm, unspecified     History of malignant neoplasm    Personal history of other diseases of the circulatory system     History of hypertension    Personal history of other diseases of the circulatory system     History of abnormal electrocardiography    Personal history of other diseases of the digestive system     History of gastroesophageal reflux (GERD)    Personal history of other diseases of the musculoskeletal system and connective tissue     History of low back pain    Personal history of other diseases of the  "musculoskeletal system and connective tissue     History of spinal stenosis    Personal history of other diseases of the musculoskeletal system and connective tissue     History of arthritis    Personal history of other endocrine, nutritional and metabolic disease     History of hypothyroidism    Personal history of other endocrine, nutritional and metabolic disease     History of hyperlipidemia    Personal history of other endocrine, nutritional and metabolic disease     History of type 2 diabetes mellitus    Personal history of other endocrine, nutritional and metabolic disease     History of hypoglycemia    Personal history of other specified conditions     History of chest pain    Polyp of stomach and duodenum     Gastric polyp     Past Surgical History:   Procedure Laterality Date    CORONARY ANGIOPLASTY WITH STENT PLACEMENT  07/27/2018    Cath Placement Of Stent 1    HAND SURGERY  07/27/2018    Hand Surgery                                                                                                                                                          OTHER SURGICAL HISTORY  07/27/2018    Surgery    OTHER SURGICAL HISTORY  07/27/2018    Total Hip Replacement Left       Charting was completed using voice recognition technology and may include unintended errors.     Subjective   Patient ID: Curtis Brown is a 82 y.o. male who presents for the following    Assessment/Plan       HPI      Visit Vitals  Pulse 82   Temp 36.1 °C (97 °F)   Ht 1.727 m (5' 8\")   SpO2 97%   BMI 30.23 kg/m²   Smoking Status Never   BSA 2.08 m²     PHYSICAL EXAM       REVIEW OF SYSTEMS       Allergies   Allergen Reactions    Finasteride Unknown    Morphine Unknown    Penicillins Unknown    Valacyclovir Rash       Current Outpatient Medications   Medication Sig Dispense Refill    Accu-Chek Pippa Plus test strp strip USE TO TEST ONCE DAILY.      Accu-Chek Fastclix Lancet Drum misc 1 Lancet early in the morning..      amLODIPine " (Norvasc) 10 mg tablet Take 1 tablet (10 mg) by mouth once daily. 90 tablet 3    aspirin 81 mg EC tablet Take 1 tablet (81 mg) by mouth once daily.      biotin 1 mg capsule Take by mouth.      calcium carbonate-vit D3-min 600 mg calcium- 400 unit tablet Take by mouth.      calcium carbonate-vitamin D3 (Caltrate with Vitamin D3) 600 mg-20 mcg (800 unit) tablet Take 1 tablet by mouth once daily. With breakfast      clindamycin (Cleocin) 300 mg capsule Take 1 capsule (300 mg) by mouth. 1 HOUR BEFORE PROCEDURE AND 6 HOURS AFTER PROCEDURE AS DIRECTED      coenzyme Q-10 300 mg capsule capsule Take by mouth.      colchicine 0.6 mg tablet Take 1 tablet (0.6 mg) by mouth twice a day.      cyanocobalamin (Vitamin B-12) 100 mcg tablet Take by mouth.      diphenoxylate-atropine (Lomotil) 2.5-0.025 mg tablet Take 1 tablet by mouth once daily. With additional tablet in evening if needed 30 tablet 2    eye vitamin supplement (Ocuvite Eye Health Formula) capsule Take 1 tablet by mouth once daily.      famotidine (Pepcid) 40 mg tablet Take 1 tablet (40 mg) by mouth 2 times a day. 180 tablet 3    finasteride (Proscar) 5 mg tablet Take 1 tablet (5 mg) by mouth once daily. 90 tablet 3    glimepiride (Amaryl) 1 mg tablet Take by mouth.      hydroCHLOROthiazide (HYDRODiuril) 25 mg tablet Take 1 tablet (25 mg) by mouth once daily. 90 tablet 3    lactobacillus acidophilus capsule Take 1 capsule by mouth once daily. With breakfast      Livalo 2 mg tablet       losartan (Cozaar) 50 mg tablet Take 1 tablet (50 mg) by mouth once daily.      metoprolol tartrate (Lopressor) 25 mg tablet Take 1 tablet (25 mg) by mouth 2 times a day.      multivit-min/folic/vit K/lycop (ONE-A-DAY MEN'S MULTIVITAMIN ORAL) Take by mouth. VitaCraves Oral Tablet Chewable      multivitamin capsule Take by mouth.      nitroglycerin (Nitrostat) 0.4 mg SL tablet Place 1 tablet (0.4 mg) under the tongue every 5 minutes if needed for chest pain. Under the tongue as needed  for chest pain 25 tablet 2    potassium chloride CR (K-Tab) 20 mEq ER tablet Take by mouth.      pravastatin (Pravachol) 20 mg tablet Take 1 tablet (20 mg) by mouth once daily.      predniSONE (Deltasone) 10 mg tablet Take 1 tablet (10 mg) by mouth once daily.      psyllium (Metamucil) powder Take by mouth.      ubidecarenone/vitamin E (COENZYME Q10-VITAMIN E ORAL) Take by mouth.      vits A,C,E/lutein/minerals (OCUVITE WITH LUTEIN ORAL) Take 1 tablet by mouth once daily with a meal.       No current facility-administered medications for this visit.       Objective     Hospital Outpatient Visit on 10/09/2023   Component Date Value Ref Range Status    LV A4C EF 10/09/2023 57.5   Final       Radiology: Reviewed imaging in powerchart.  No results found.    Family History   Problem Relation Name Age of Onset    Stroke Mother      Heart failure Mother      Other (hepatic cirrhosis) Father      Parkinsonism Brother       Social History     Socioeconomic History    Marital status:      Spouse name: None    Number of children: None    Years of education: None    Highest education level: None   Occupational History    None   Tobacco Use    Smoking status: Never    Smokeless tobacco: Never   Substance and Sexual Activity    Alcohol use: Never    Drug use: Never    Sexual activity: None   Other Topics Concern    None   Social History Narrative    None     Social Determinants of Health     Financial Resource Strain: Not on file   Food Insecurity: Not on file   Transportation Needs: Not on file   Physical Activity: Not on file   Stress: Not on file   Social Connections: Not on file   Intimate Partner Violence: Not on file   Housing Stability: Not on file     Past Medical History:   Diagnosis Date    Abnormal result of other cardiovascular function study     Abnormal nuclear stress test    Acute ischemic heart disease, unspecified (CMS/HCC)     ACS (acute coronary syndrome)    Atherosclerotic heart disease of native  coronary artery without angina pectoris     Arteriosclerotic coronary artery disease    Chronic sinusitis, unspecified     Chronic congestion of paranasal sinus    Coronary angioplasty status     History of percutaneous coronary intervention    Encounter for preprocedural cardiovascular examination     Pre-operative cardiovascular examination    Encounter for screening for malignant neoplasm of prostate     Screening PSA (prostate specific antigen)    Lyme disease, unspecified 12/02/2013    Lyme disease    Other cervical disc displacement, unspecified cervical region     Herniated cervical disc without myelopathy    Other specified postprocedural states     History of carpal tunnel surgery    Other systemic sclerosis (CMS/HCC)     Cutaneous scleroderma    Personal history of malignant neoplasm, unspecified     History of malignant neoplasm    Personal history of other diseases of the circulatory system     History of hypertension    Personal history of other diseases of the circulatory system     History of abnormal electrocardiography    Personal history of other diseases of the digestive system     History of gastroesophageal reflux (GERD)    Personal history of other diseases of the musculoskeletal system and connective tissue     History of low back pain    Personal history of other diseases of the musculoskeletal system and connective tissue     History of spinal stenosis    Personal history of other diseases of the musculoskeletal system and connective tissue     History of arthritis    Personal history of other endocrine, nutritional and metabolic disease     History of hypothyroidism    Personal history of other endocrine, nutritional and metabolic disease     History of hyperlipidemia    Personal history of other endocrine, nutritional and metabolic disease     History of type 2 diabetes mellitus    Personal history of other endocrine, nutritional and metabolic disease     History of hypoglycemia     Personal history of other specified conditions     History of chest pain    Polyp of stomach and duodenum     Gastric polyp     Past Surgical History:   Procedure Laterality Date    CORONARY ANGIOPLASTY WITH STENT PLACEMENT  07/27/2018    Cath Placement Of Stent 1    HAND SURGERY  07/27/2018    Hand Surgery                                                                                                                                                          OTHER SURGICAL HISTORY  07/27/2018    Surgery    OTHER SURGICAL HISTORY  07/27/2018    Total Hip Replacement Left       Charting was completed using voice recognition technology and may include unintended errors.

## 2023-10-27 DIAGNOSIS — I15.9 SECONDARY HYPERTENSION: ICD-10-CM

## 2023-10-27 DIAGNOSIS — K21.9 GASTROESOPHAGEAL REFLUX DISEASE, UNSPECIFIED WHETHER ESOPHAGITIS PRESENT: ICD-10-CM

## 2023-10-27 LAB — NON-UH HIE PROSTATIC SPECIFIC ANTIGEN: 6.2 NG/ML (ref 0–4)

## 2023-10-27 RX ORDER — AMLODIPINE BESYLATE 10 MG/1
10 TABLET ORAL DAILY
Qty: 90 TABLET | Refills: 3 | Status: SHIPPED | OUTPATIENT
Start: 2023-10-27

## 2023-10-27 RX ORDER — HYDROCHLOROTHIAZIDE 25 MG/1
25 TABLET ORAL DAILY
Qty: 90 TABLET | Refills: 3 | Status: SHIPPED | OUTPATIENT
Start: 2023-10-27

## 2023-10-27 RX ORDER — FAMOTIDINE 40 MG/1
40 TABLET, FILM COATED ORAL 2 TIMES DAILY
Qty: 180 TABLET | Refills: 3 | Status: SHIPPED | OUTPATIENT
Start: 2023-10-27

## 2023-10-30 ENCOUNTER — TELEPHONE (OUTPATIENT)
Dept: PRIMARY CARE | Facility: CLINIC | Age: 83
End: 2023-10-30
Payer: MEDICARE

## 2023-10-30 NOTE — TELEPHONE ENCOUNTER
I left a general msg for pt to call us back  He has an elevated PSA  Dr Gonzalez wants him to see a urologist  Dr Tripathi's office is [phone]

## 2023-10-30 NOTE — TELEPHONE ENCOUNTER
----- Message from Baudilio Gonzalez DO sent at 10/27/2023  4:34 PM EDT -----  Prostate cancer screen is elevated, recommend follow up with urology for next steps.

## 2023-10-30 NOTE — TELEPHONE ENCOUNTER
PT is on prednisone and believes this is why the PSA is elevated.      He is down to his last pills.

## 2023-10-31 NOTE — TELEPHONE ENCOUNTER
I guess you let PT know Prostate cancer screen is elevated, recommend follow up with urology for next steps.     And his response is what I let you know.  He wasn't going to follow up with urology.

## 2023-11-06 ENCOUNTER — DOCUMENTATION (OUTPATIENT)
Dept: CARE COORDINATION | Facility: CLINIC | Age: 83
End: 2023-11-06
Payer: MEDICARE

## 2023-11-06 ENCOUNTER — TELEPHONE (OUTPATIENT)
Dept: PRIMARY CARE | Facility: CLINIC | Age: 83
End: 2023-11-06
Payer: MEDICARE

## 2023-11-06 NOTE — TELEPHONE ENCOUNTER
TCM, discharged on 11/03/23 , patient did receive their discharge medications. Patient does not require immediate attention of the attending at this time and is scheduled for an appointment on 11/15/23

## 2023-11-15 ENCOUNTER — LAB (OUTPATIENT)
Dept: LAB | Facility: LAB | Age: 83
End: 2023-11-15
Payer: MEDICARE

## 2023-11-15 ENCOUNTER — OFFICE VISIT (OUTPATIENT)
Dept: PRIMARY CARE | Facility: CLINIC | Age: 83
End: 2023-11-15
Payer: MEDICARE

## 2023-11-15 VITALS
DIASTOLIC BLOOD PRESSURE: 68 MMHG | SYSTOLIC BLOOD PRESSURE: 128 MMHG | HEART RATE: 82 BPM | BODY MASS INDEX: 28.43 KG/M2 | OXYGEN SATURATION: 98 % | WEIGHT: 187.6 LBS | TEMPERATURE: 96.8 F | HEIGHT: 68 IN

## 2023-11-15 DIAGNOSIS — D64.9 ANEMIA, UNSPECIFIED TYPE: ICD-10-CM

## 2023-11-15 DIAGNOSIS — N17.9 AKI (ACUTE KIDNEY INJURY) (CMS-HCC): ICD-10-CM

## 2023-11-15 DIAGNOSIS — N10 ACUTE PYELONEPHRITIS: Primary | ICD-10-CM

## 2023-11-15 LAB
ANION GAP SERPL CALC-SCNC: 12 MMOL/L (ref 10–20)
BUN SERPL-MCNC: 21 MG/DL (ref 6–23)
CALCIUM SERPL-MCNC: 9.7 MG/DL (ref 8.6–10.6)
CHLORIDE SERPL-SCNC: 101 MMOL/L (ref 98–107)
CO2 SERPL-SCNC: 31 MMOL/L (ref 21–32)
CREAT SERPL-MCNC: 1.11 MG/DL (ref 0.5–1.3)
ERYTHROCYTE [DISTWIDTH] IN BLOOD BY AUTOMATED COUNT: 14.1 % (ref 11.5–14.5)
FERRITIN SERPL-MCNC: 516 NG/ML (ref 20–300)
FOLATE SERPL-MCNC: >24 NG/ML
GFR SERPL CREATININE-BSD FRML MDRD: 67 ML/MIN/1.73M*2
GLUCOSE SERPL-MCNC: 100 MG/DL (ref 74–99)
HCT VFR BLD AUTO: 43.5 % (ref 41–52)
HGB BLD-MCNC: 13.8 G/DL (ref 13.5–17.5)
IRON SATN MFR SERPL: 22 % (ref 25–45)
IRON SERPL-MCNC: 97 UG/DL (ref 35–150)
MCH RBC QN AUTO: 32.3 PG (ref 26–34)
MCHC RBC AUTO-ENTMCNC: 31.7 G/DL (ref 32–36)
MCV RBC AUTO: 102 FL (ref 80–100)
NRBC BLD-RTO: 0 /100 WBCS (ref 0–0)
PLATELET # BLD AUTO: 308 X10*3/UL (ref 150–450)
POTASSIUM SERPL-SCNC: 4.3 MMOL/L (ref 3.5–5.3)
PROT SERPL-MCNC: 7.1 G/DL (ref 6.4–8.2)
RBC # BLD AUTO: 4.27 X10*6/UL (ref 4.5–5.9)
SODIUM SERPL-SCNC: 140 MMOL/L (ref 136–145)
TIBC SERPL-MCNC: 448 UG/DL (ref 240–445)
UIBC SERPL-MCNC: 351 UG/DL (ref 110–370)
VIT B12 SERPL-MCNC: 1529 PG/ML (ref 211–911)
WBC # BLD AUTO: 7.3 X10*3/UL (ref 4.4–11.3)

## 2023-11-15 PROCEDURE — 86334 IMMUNOFIX E-PHORESIS SERUM: CPT

## 2023-11-15 PROCEDURE — 84165 PROTEIN E-PHORESIS SERUM: CPT | Performed by: INTERNAL MEDICINE

## 2023-11-15 PROCEDURE — 83550 IRON BINDING TEST: CPT

## 2023-11-15 PROCEDURE — 86320 SERUM IMMUNOELECTROPHORESIS: CPT | Performed by: INTERNAL MEDICINE

## 2023-11-15 PROCEDURE — 1036F TOBACCO NON-USER: CPT | Performed by: INTERNAL MEDICINE

## 2023-11-15 PROCEDURE — 1160F RVW MEDS BY RX/DR IN RCRD: CPT | Performed by: INTERNAL MEDICINE

## 2023-11-15 PROCEDURE — 84165 PROTEIN E-PHORESIS SERUM: CPT

## 2023-11-15 PROCEDURE — 86325 OTHER IMMUNOELECTROPHORESIS: CPT

## 2023-11-15 PROCEDURE — 3074F SYST BP LT 130 MM HG: CPT | Performed by: INTERNAL MEDICINE

## 2023-11-15 PROCEDURE — 85027 COMPLETE CBC AUTOMATED: CPT

## 2023-11-15 PROCEDURE — 84155 ASSAY OF PROTEIN SERUM: CPT

## 2023-11-15 PROCEDURE — 80048 BASIC METABOLIC PNL TOTAL CA: CPT

## 2023-11-15 PROCEDURE — 99496 TRANSJ CARE MGMT HIGH F2F 7D: CPT | Performed by: INTERNAL MEDICINE

## 2023-11-15 PROCEDURE — 84166 PROTEIN E-PHORESIS/URINE/CSF: CPT

## 2023-11-15 PROCEDURE — 82607 VITAMIN B-12: CPT

## 2023-11-15 PROCEDURE — 3078F DIAST BP <80 MM HG: CPT | Performed by: INTERNAL MEDICINE

## 2023-11-15 PROCEDURE — 82746 ASSAY OF FOLIC ACID SERUM: CPT

## 2023-11-15 PROCEDURE — 82728 ASSAY OF FERRITIN: CPT

## 2023-11-15 PROCEDURE — 84156 ASSAY OF PROTEIN URINE: CPT

## 2023-11-15 PROCEDURE — 83540 ASSAY OF IRON: CPT

## 2023-11-15 PROCEDURE — 1159F MED LIST DOCD IN RCRD: CPT | Performed by: INTERNAL MEDICINE

## 2023-11-15 PROCEDURE — 36415 COLL VENOUS BLD VENIPUNCTURE: CPT

## 2023-11-15 NOTE — PROGRESS NOTES
Subjective   Patient ID: Curtis Brown is a 82 y.o. male who presents for the following    TRANSITIONS OF CARE     MEDICARE WELLNESS 10/25/2023     Controlled refill 9/2023  Assessment/Plan   SEPSIS ACUTE PYELONEPHROSIS : resolved at this time    Slight anemia hgb trending 11-12s lately but 1 year ago his hgb was 15. He complains of fatigue.     ALEXIA: resolving check bmp    lymphocytic colitis diarrhea: stable on diphenoxylate. increase #30 (he just had this filled on 10/10. He iwll need to come back in a month    pericardial effusion: Patient no longer on colchicine and has follow up with dr montes     Hypothyroid: tsh wnl      htn: stable, continue metoprolol.  improved, continue on   hctz to 25mg daily ,   amlodipine 10mg daily.   Losartan 50mg daily      hld: stable, continue statin. LDL < 70. Patient wants to change Lipitor to Crestor per patient's request. (Patient went to Dr Rodrigues and will get pitavastatin)     Dry macular degeneration: stable following with Dr Andrew @ [facility]     dm2: very controlled, discussed treatment of low blood sugar. Patient follows with dr rodrigues, A1c 5.7 , 5 mg tablet Amaryl daily . Watch for low sugars. Follow up with endo      PVD: stable, continue statin and aspirin      diastolic CHF and severe aortic stenosis: stable, patient is very active. stable. getting echo q6hrs with dr montes soon      BPH: patient is taking finasteride. He denies any complaints and he needs a refill       patient does not have children. needs to consider POA outs      patient was recently seen in the hospital and patient's hospital record has been reviewed with the patient including, but not limited to, discharge summary, labs, imaging, consultation notes (if pertinent). Ambulatory medication list and discharge hospitalization list has been compared and updated for changes.      hpi  male cad s/p stent 11/2013, PVD, htn, hld comes for the following      @[facility] from October 30, 2023 to November 3,  2023 patient's was hospitalized for subjective fevers and sweats.  He was found to have a UTI and left pyelonephritis.  Patient was treated for E. coli growing in his urine sensitive to most antibiotics except ampicillin and Unasyn.  Patient was recommended to continue on Cipro 500 mg twice a day for an additional 10 days posthospitalization.  He is noted to have a left solid renal cyst.  An MRI of the abdomen was done showing pyelonephritis but also a 1.6 cm left renal lesion which is characteristic of a hemorrhagic cyst.  Patient's losartan was still on hold upon discharge.     HX PERICARDITIS AND PERICARDIAL EFFUSION sEPTEMBER 2022: this has since resolved. and he follows with cardiology regularly for this     lymphocytic colitis (biopsy + May 18, 2021)diarrhea: patient is on diphenoxylate/atropine for his diarrhea on a regular basis. he was seen by Dr De La Paz (who has just recently retired). he is controlled on dipheoxylate-atropine. he does on occasion during the month require an additional dose. patient continues to do well on his current management      diastolic chf: Patient denies any chest pain, angina or exertional dyspnea. patient denies any swelling to lower extremities. Patient follows with cardiology on a regular basis     echo from feb 2020 ef 60% with moderate to severe aortic valve stenosis and mild tr.      Diabetes Type 2: patient denies any low blood sugars. Patient is following with opthalmology on a yearly basis. Patient is taking glimepiride a1c 5s typically. patient following with dr christina.      hypothyroid: patient denies any hypo or hypothyroid symptoms. patient denies any palpitations, diarrhea or constipation     HLD: Patient denies any muscle aches and is tolerating statin therapy     pvd: denies any previous strokes, no headaches. no claudication     spinal stenosis: pain intermittent, in gluteal region     social: patient denies any smoking, drug or alcohol abuse     surgical  history: left hip replacement 11/2017     COLONOSCOPY MAY 2021 AT [facility] with 5 year follow up      HOSPITALIZATIONS   September 3 to September 7, 2022 @[facility] . Patient came in originally to [facility] for shortness of breath and chest pain. Patient was found to have acute pericarditis along with moderate-sized pericardial effusion. He was placed on colchicine twice a day and his symptoms dramatically improved. Cardiology as well as endocrinology was following him and he was discharged on colchicine.     OARRS:  No data recorded  I have personally reviewed the OARRS report for Curtis Brown. I have considered the risks of abuse, dependence, addiction and diversion    Is the patient prescribed a combination of a benzodiazepine and opioid?  Yes, I feel it is clincially indicated to continue the medication and have discussed with the patient risks/benefits/alternatives.    Last Urine Drug Screen / ordered today: Yes  Recent Results (from the past 8760 hour(s))   OPIATE/OPIOID/BENZO PRESCRIPTION COMPLIANCE    Collection Time: 03/13/23  4:02 PM   Result Value Ref Range    DRUG SCREEN COMMENT URINE SEE BELOW     Creatine, Urine 127.8 mg/dL    Amphetamine Screen, Urine PRESUMPTIVE NEGATIVE NEGATIVE    Barbiturate Screen, Urine PRESUMPTIVE NEGATIVE NEGATIVE    Cannabinoid Screen, Urine PRESUMPTIVE NEGATIVE NEGATIVE    Cocaine Screen, Urine PRESUMPTIVE NEGATIVE NEGATIVE    PCP Screen, Urine PRESUMPTIVE NEGATIVE NEGATIVE    7-Aminoclonazepam <25 Cutoff <25 ng/mL    Alpha-Hydroxyalprazolam <25 Cutoff <25 ng/mL    Alpha-Hydroxymidazolam <25 Cutoff <25 ng/mL    Alprazolam <25 Cutoff <25 ng/mL    Chlordiazepoxide <25 Cutoff <25 ng/mL    Clonazepam <25 Cutoff <25 ng/mL    Diazepam <25 Cutoff <25 ng/mL    Lorazepam <25 Cutoff <25 ng/mL    Midazolam <25 Cutoff <25 ng/mL    Nordiazepam <25 Cutoff <25 ng/mL    Oxazepam <25 Cutoff <25 ng/mL    Temazepam <25 Cutoff <25 ng/mL     "Zolpidem <25 Cutoff <25 ng/mL    Zolpidem Metabolite (ZCA) <25 Cutoff <25 ng/mL    6-Acetylmorphine <25 Cutoff <25 ng/mL    Codeine <50 Cutoff <50 ng/mL    Hydrocodone <25 Cutoff <25 ng/mL    Hydromorphone <25 Cutoff <25 ng/mL    Morphine Urine <50 Cutoff <50 ng/mL    Norhydrocodone <25 Cutoff <25 ng/mL    Noroxycodone <25 Cutoff <25 ng/mL    Oxycodone <25 Cutoff <25 ng/mL    Oxymorphone <25 Cutoff <25 ng/mL    Tramadol <50 Cutoff <50 ng/mL    O-Desmethyltramadol <50 Cutoff <50 ng/mL    Fentanyl <2.5 Cutoff<2.5 ng/mL    Norfentanyl <2.5 Cutoff<2.5 ng/mL    METHADONE CONFIRMATION,URINE <25 Cutoff <25 ng/mL    EDDP <25 Cutoff <25 ng/mL     Results are as expected.     Controlled Substance Agreement:  Date of the Last Agreement:   3/13/2023     Reviewed Controlled Substance Agreement including but not limited to the benefits, risks, and alternatives to treatment with a Controlled Substance medication(s).    Antidiarrheal:   What is the patient's goal of therapy?  Improve diarrhea   Is this being achieved with current treatment? yes  Is the patient currently prescribed an opioid, and/or benzodiazepine?   Yes, I feel it is clincially indicated to continue the medication and have discussed with the patient risks/benefits/alternatives.    Activities of Daily Living:   Is your overall impression that this patient is benefiting (symptom reduction outweighs side effects) from antidiarrheal therapy? No     1. Physical Functioning: Better  2. Family Relationship: Better  3. Social Relationship: Better  4. Mood: Better  5. Sleep Patterns: Better  6. Overall Function: Better      Visit Vitals  /68   Pulse 82   Temp 36 °C (96.8 °F)   Ht 1.727 m (5' 8\")   Wt 85.1 kg (187 lb 9.6 oz)   SpO2 98%   BMI 28.52 kg/m²   Smoking Status Never   BSA 2.02 m²     PHYSICAL EXAM   General appearance: Alert and in no acute distress. speech is clear and coherent  No head trauma  Neck: no carotid bruits or thyromegaly. no lymphadenopathy   " Respiratory : No respiratory distress, normal respiratory rhythm and effort. Clear bilateral breath sounds. No wheezing or rhonchi.   Cardiovascular: heart rate regular, S1, S2. no murmurs. no Lower extremity edema  Skin inspection: Normal skin color and pigmentation, normal skin turgor and no visible rash, induration, or cellulitis  MSK: 5/5 strength upper and lower extremities without gait abnormalities. no loss of muscle mass   Neuro: 2-12 CN grossly intact.  no slurred speech. no lateralizing deficit  Psychiatric Orientation: Oriented to person, place, and time. no depression, homicidal or suicidal thoughts, normal affect     REVIEW OF SYSTEMS     Constitutional: not feeling tired and no fever, chills or sweats. Denies weight loss  no headache  Cardiovascular: no exertional chest pain, no palpitations, no lower extremity edema and no intermittent leg claudication.   Lungs: Denies shortness of breath, exertional dyspnea, wheezing  Gastrointestinal: no change in bowel habits, no diarrhea, no nausea, no vomiting and no abdominal pain. Denies Melena, brbpr or dark stool  Musculoskeletal: no myalgias, no muscle weakness and no limb swelling.   Skin: no rashes, no change in skin color and pigmentation and no skin lumps.   Neurological: no headaches, no seizures, no numbness, no lateralizing deficits and no fainting.   Psychiatric: no depression and no anxiety.   Urine: denies polyuria, hematuria, dysuria      Allergies   Allergen Reactions    Finasteride Unknown    Morphine Unknown    Penicillins Unknown    Valacyclovir Rash       Current Outpatient Medications   Medication Sig Dispense Refill    Accu-Chek Pippa Plus test strp strip USE TO TEST ONCE DAILY.      Accu-Chek Fastclix Lancet Drum mis 1 Lancet early in the morning..      amLODIPine (Norvasc) 10 mg tablet Take 1 tablet (10 mg) by mouth once daily. 90 tablet 3    aspirin 81 mg EC tablet Take 1 tablet (81 mg) by mouth once daily.      biotin 1 mg capsule Take  by mouth.      calcium carbonate-vit D3-min 600 mg calcium- 400 unit tablet Take by mouth.      calcium carbonate-vitamin D3 (Caltrate with Vitamin D3) 600 mg-20 mcg (800 unit) tablet Take 1 tablet by mouth once daily. With breakfast      clindamycin (Cleocin) 300 mg capsule Take 1 capsule (300 mg) by mouth. 1 HOUR BEFORE PROCEDURE AND 6 HOURS AFTER PROCEDURE AS DIRECTED      coenzyme Q-10 300 mg capsule capsule Take by mouth.      colchicine 0.6 mg tablet Take 1 tablet (0.6 mg) by mouth twice a day.      cyanocobalamin (Vitamin B-12) 100 mcg tablet Take by mouth.      diphenoxylate-atropine (Lomotil) 2.5-0.025 mg tablet Take 1 tablet by mouth once daily. With additional tablet in evening if needed 30 tablet 2    eye vitamin supplement (Ocuvite Eye Health Formula) capsule Take 1 tablet by mouth once daily.      famotidine (Pepcid) 40 mg tablet Take 1 tablet (40 mg) by mouth 2 times a day. 180 tablet 3    finasteride (Proscar) 5 mg tablet Take 1 tablet (5 mg) by mouth once daily. 90 tablet 3    glimepiride (Amaryl) 1 mg tablet Take by mouth.      hydroCHLOROthiazide (HYDRODiuril) 25 mg tablet Take 1 tablet (25 mg) by mouth once daily. 90 tablet 3    lactobacillus acidophilus capsule Take 1 capsule by mouth once daily. With breakfast      Livalo 2 mg tablet       losartan (Cozaar) 50 mg tablet Take 1 tablet (50 mg) by mouth once daily.      metoprolol tartrate (Lopressor) 25 mg tablet Take 1 tablet (25 mg) by mouth 2 times a day.      multivit-min/folic/vit K/lycop (ONE-A-DAY MEN'S MULTIVITAMIN ORAL) Take by mouth. VitaCraves Oral Tablet Chewable      multivitamin capsule Take by mouth.      nitroglycerin (Nitrostat) 0.4 mg SL tablet Place 1 tablet (0.4 mg) under the tongue every 5 minutes if needed for chest pain. Under the tongue as needed for chest pain 25 tablet 2    potassium chloride CR (K-Tab) 20 mEq ER tablet Take by mouth.      pravastatin (Pravachol) 20 mg tablet Take 1 tablet (20 mg) by mouth once daily.       predniSONE (Deltasone) 10 mg tablet Take 1 tablet (10 mg) by mouth once daily.      psyllium (Metamucil) powder Take by mouth.      ubidecarenone/vitamin E (COENZYME Q10-VITAMIN E ORAL) Take by mouth.      vits A,C,E/lutein/minerals (OCUVITE WITH LUTEIN ORAL) Take 1 tablet by mouth once daily with a meal.       No current facility-administered medications for this visit.       Objective     Orders Only on 10/27/2023   Component Date Value Ref Range Status    [facility] HIE Prostatic Specific Anti* 10/27/2023 6.2 (H)  0.0 - 4.0 ng/mL Final   Hospital Outpatient Visit on 10/09/2023   Component Date Value Ref Range Status    LV A4C EF 10/09/2023 57.5   Final       Radiology: Reviewed imaging in powerchart.  No results found.    Family History   Problem Relation Name Age of Onset    Stroke Mother      Heart failure Mother      Other (hepatic cirrhosis) Father      Parkinsonism Brother       Social History     Socioeconomic History    Marital status:      Spouse name: None    Number of children: None    Years of education: None    Highest education level: None   Occupational History    None   Tobacco Use    Smoking status: Never    Smokeless tobacco: Never   Substance and Sexual Activity    Alcohol use: Never    Drug use: Never    Sexual activity: None   Other Topics Concern    None   Social History Narrative    None     Social Determinants of Health     Financial Resource Strain: Not on file   Food Insecurity: Not on file   Transportation Needs: Not on file   Physical Activity: Not on file   Stress: Not on file   Social Connections: Not on file   Intimate Partner Violence: Not on file   Housing Stability: Not on file     Past Medical History:   Diagnosis Date    Abnormal result of other cardiovascular function study     Abnormal nuclear stress test    Acute ischemic heart disease, unspecified (CMS/HCC)     ACS (acute coronary syndrome)    Atherosclerotic heart disease of native coronary artery without angina  pectoris     Arteriosclerotic coronary artery disease    Chronic sinusitis, unspecified     Chronic congestion of paranasal sinus    Coronary angioplasty status     History of percutaneous coronary intervention    Encounter for preprocedural cardiovascular examination     Pre-operative cardiovascular examination    Encounter for screening for malignant neoplasm of prostate     Screening PSA (prostate specific antigen)    Lyme disease, unspecified 12/02/2013    Lyme disease    Other cervical disc displacement, unspecified cervical region     Herniated cervical disc without myelopathy    Other specified postprocedural states     History of carpal tunnel surgery    Other systemic sclerosis (CMS/HCC)     Cutaneous scleroderma    Personal history of malignant neoplasm, unspecified     History of malignant neoplasm    Personal history of other diseases of the circulatory system     History of hypertension    Personal history of other diseases of the circulatory system     History of abnormal electrocardiography    Personal history of other diseases of the digestive system     History of gastroesophageal reflux (GERD)    Personal history of other diseases of the musculoskeletal system and connective tissue     History of low back pain    Personal history of other diseases of the musculoskeletal system and connective tissue     History of spinal stenosis    Personal history of other diseases of the musculoskeletal system and connective tissue     History of arthritis    Personal history of other endocrine, nutritional and metabolic disease     History of hypothyroidism    Personal history of other endocrine, nutritional and metabolic disease     History of hyperlipidemia    Personal history of other endocrine, nutritional and metabolic disease     History of type 2 diabetes mellitus    Personal history of other endocrine, nutritional and metabolic disease     History of hypoglycemia    Personal history of other specified  "conditions     History of chest pain    Polyp of stomach and duodenum     Gastric polyp     Past Surgical History:   Procedure Laterality Date    CORONARY ANGIOPLASTY WITH STENT PLACEMENT  07/27/2018    Cath Placement Of Stent 1    HAND SURGERY  07/27/2018    Hand Surgery                                                                                                                                                          OTHER SURGICAL HISTORY  07/27/2018    Surgery    OTHER SURGICAL HISTORY  07/27/2018    Total Hip Replacement Left       Charting was completed using voice recognition technology and may include unintended errors.     Subjective   Patient ID: Curtis Brown is a 82 y.o. male who presents for the following    Assessment/Plan       HPI      Visit Vitals  /68   Pulse 82   Temp 36 °C (96.8 °F)   Ht 1.727 m (5' 8\")   Wt 85.1 kg (187 lb 9.6 oz)   SpO2 98%   BMI 28.52 kg/m²   Smoking Status Never   BSA 2.02 m²     PHYSICAL EXAM       REVIEW OF SYSTEMS       Allergies   Allergen Reactions    Finasteride Unknown    Morphine Unknown    Penicillins Unknown    Valacyclovir Rash       Current Outpatient Medications   Medication Sig Dispense Refill    Accu-Chek Pippa Plus test strp strip USE TO TEST ONCE DAILY.      Accu-Chek Fastclix Lancet Drum misc 1 Lancet early in the morning..      amLODIPine (Norvasc) 10 mg tablet Take 1 tablet (10 mg) by mouth once daily. 90 tablet 3    aspirin 81 mg EC tablet Take 1 tablet (81 mg) by mouth once daily.      biotin 1 mg capsule Take by mouth.      calcium carbonate-vit D3-min 600 mg calcium- 400 unit tablet Take by mouth.      calcium carbonate-vitamin D3 (Caltrate with Vitamin D3) 600 mg-20 mcg (800 unit) tablet Take 1 tablet by mouth once daily. With breakfast      clindamycin (Cleocin) 300 mg capsule Take 1 capsule (300 mg) by mouth. 1 HOUR BEFORE PROCEDURE AND 6 HOURS AFTER PROCEDURE AS DIRECTED      coenzyme Q-10 300 mg capsule capsule Take by mouth.      " colchicine 0.6 mg tablet Take 1 tablet (0.6 mg) by mouth twice a day.      cyanocobalamin (Vitamin B-12) 100 mcg tablet Take by mouth.      diphenoxylate-atropine (Lomotil) 2.5-0.025 mg tablet Take 1 tablet by mouth once daily. With additional tablet in evening if needed 30 tablet 2    eye vitamin supplement (Ocuvite Eye Health Formula) capsule Take 1 tablet by mouth once daily.      famotidine (Pepcid) 40 mg tablet Take 1 tablet (40 mg) by mouth 2 times a day. 180 tablet 3    finasteride (Proscar) 5 mg tablet Take 1 tablet (5 mg) by mouth once daily. 90 tablet 3    glimepiride (Amaryl) 1 mg tablet Take by mouth.      hydroCHLOROthiazide (HYDRODiuril) 25 mg tablet Take 1 tablet (25 mg) by mouth once daily. 90 tablet 3    lactobacillus acidophilus capsule Take 1 capsule by mouth once daily. With breakfast      Livalo 2 mg tablet       losartan (Cozaar) 50 mg tablet Take 1 tablet (50 mg) by mouth once daily.      metoprolol tartrate (Lopressor) 25 mg tablet Take 1 tablet (25 mg) by mouth 2 times a day.      multivit-min/folic/vit K/lycop (ONE-A-DAY MEN'S MULTIVITAMIN ORAL) Take by mouth. VitaCraves Oral Tablet Chewable      multivitamin capsule Take by mouth.      nitroglycerin (Nitrostat) 0.4 mg SL tablet Place 1 tablet (0.4 mg) under the tongue every 5 minutes if needed for chest pain. Under the tongue as needed for chest pain 25 tablet 2    potassium chloride CR (K-Tab) 20 mEq ER tablet Take by mouth.      pravastatin (Pravachol) 20 mg tablet Take 1 tablet (20 mg) by mouth once daily.      predniSONE (Deltasone) 10 mg tablet Take 1 tablet (10 mg) by mouth once daily.      psyllium (Metamucil) powder Take by mouth.      ubidecarenone/vitamin E (COENZYME Q10-VITAMIN E ORAL) Take by mouth.      vits A,C,E/lutein/minerals (OCUVITE WITH LUTEIN ORAL) Take 1 tablet by mouth once daily with a meal.       No current facility-administered medications for this visit.       Objective     Orders Only on 10/27/2023   Component  Date Value Ref Range Status    [facility] HIE Prostatic Specific Anti* 10/27/2023 6.2 (H)  0.0 - 4.0 ng/mL Final   Hospital Outpatient Visit on 10/09/2023   Component Date Value Ref Range Status    LV A4C EF 10/09/2023 57.5   Final       Radiology: Reviewed imaging in powerchart.  No results found.    Family History   Problem Relation Name Age of Onset    Stroke Mother      Heart failure Mother      Other (hepatic cirrhosis) Father      Parkinsonism Brother       Social History     Socioeconomic History    Marital status:      Spouse name: None    Number of children: None    Years of education: None    Highest education level: None   Occupational History    None   Tobacco Use    Smoking status: Never    Smokeless tobacco: Never   Substance and Sexual Activity    Alcohol use: Never    Drug use: Never    Sexual activity: None   Other Topics Concern    None   Social History Narrative    None     Social Determinants of Health     Financial Resource Strain: Not on file   Food Insecurity: Not on file   Transportation Needs: Not on file   Physical Activity: Not on file   Stress: Not on file   Social Connections: Not on file   Intimate Partner Violence: Not on file   Housing Stability: Not on file     Past Medical History:   Diagnosis Date    Abnormal result of other cardiovascular function study     Abnormal nuclear stress test    Acute ischemic heart disease, unspecified (CMS/HCC)     ACS (acute coronary syndrome)    Atherosclerotic heart disease of native coronary artery without angina pectoris     Arteriosclerotic coronary artery disease    Chronic sinusitis, unspecified     Chronic congestion of paranasal sinus    Coronary angioplasty status     History of percutaneous coronary intervention    Encounter for preprocedural cardiovascular examination     Pre-operative cardiovascular examination    Encounter for screening for malignant neoplasm of prostate     Screening PSA (prostate specific antigen)    Lyme disease,  unspecified 12/02/2013    Lyme disease    Other cervical disc displacement, unspecified cervical region     Herniated cervical disc without myelopathy    Other specified postprocedural states     History of carpal tunnel surgery    Other systemic sclerosis (CMS/HCC)     Cutaneous scleroderma    Personal history of malignant neoplasm, unspecified     History of malignant neoplasm    Personal history of other diseases of the circulatory system     History of hypertension    Personal history of other diseases of the circulatory system     History of abnormal electrocardiography    Personal history of other diseases of the digestive system     History of gastroesophageal reflux (GERD)    Personal history of other diseases of the musculoskeletal system and connective tissue     History of low back pain    Personal history of other diseases of the musculoskeletal system and connective tissue     History of spinal stenosis    Personal history of other diseases of the musculoskeletal system and connective tissue     History of arthritis    Personal history of other endocrine, nutritional and metabolic disease     History of hypothyroidism    Personal history of other endocrine, nutritional and metabolic disease     History of hyperlipidemia    Personal history of other endocrine, nutritional and metabolic disease     History of type 2 diabetes mellitus    Personal history of other endocrine, nutritional and metabolic disease     History of hypoglycemia    Personal history of other specified conditions     History of chest pain    Polyp of stomach and duodenum     Gastric polyp     Past Surgical History:   Procedure Laterality Date    CORONARY ANGIOPLASTY WITH STENT PLACEMENT  07/27/2018    Cath Placement Of Stent 1    HAND SURGERY  07/27/2018    Hand Surgery                                                                                                                                                          OTHER SURGICAL  HISTORY  07/27/2018    Surgery    OTHER SURGICAL HISTORY  07/27/2018    Total Hip Replacement Left       Charting was completed using voice recognition technology and may include unintended errors.

## 2023-11-16 LAB — PROT UR-ACNC: 9 MG/DL (ref 5–25)

## 2023-11-17 ENCOUNTER — TELEPHONE (OUTPATIENT)
Dept: PRIMARY CARE | Facility: CLINIC | Age: 83
End: 2023-11-17
Payer: MEDICARE

## 2023-11-19 LAB
ALBUMIN: 4.1 G/DL (ref 3.4–5)
ALPHA 1 GLOBULIN: 0.3 G/DL (ref 0.2–0.6)
ALPHA 2 GLOBULIN: 1 G/DL (ref 0.4–1.1)
BETA GLOBULIN: 0.8 G/DL (ref 0.5–1.2)
GAMMA GLOBULIN: 0.9 G/DL (ref 0.5–1.4)
IMMUNOFIXATION COMMENT: NORMAL
PATH REVIEW - SERUM IMMUNOFIXATION: NORMAL
PATH REVIEW-SERUM PROTEIN ELECTROPHORESIS: NORMAL
PROTEIN ELECTROPHORESIS COMMENT: NORMAL

## 2023-11-20 LAB
ALBUMIN MFR UR ELPH: 55.8 %
ALPHA1 GLOB MFR UR ELPH: 12.7 %
ALPHA2 GLOB MFR UR ELPH: 8.3 %
B-GLOBULIN MFR UR ELPH: 16.4 %
GAMMA GLOB MFR UR ELPH: 6.8 %
IMMUNOFIXATION COMMENT: NORMAL
PATH REVIEW - URINE IMMUNOFIXATION: NORMAL
PATH REVIEW-URINE PROTEIN ELECTROPHORESIS: NORMAL
URINE ELECTROPHORESIS COMMENT: NORMAL

## 2023-12-13 ENCOUNTER — LAB (OUTPATIENT)
Dept: LAB | Facility: LAB | Age: 83
End: 2023-12-13
Payer: MEDICARE

## 2023-12-13 ENCOUNTER — OFFICE VISIT (OUTPATIENT)
Dept: PRIMARY CARE | Facility: CLINIC | Age: 83
End: 2023-12-13
Payer: MEDICARE

## 2023-12-13 VITALS
DIASTOLIC BLOOD PRESSURE: 73 MMHG | OXYGEN SATURATION: 98 % | HEART RATE: 66 BPM | BODY MASS INDEX: 28.95 KG/M2 | HEIGHT: 68 IN | WEIGHT: 191 LBS | SYSTOLIC BLOOD PRESSURE: 150 MMHG

## 2023-12-13 DIAGNOSIS — K21.9 GASTROESOPHAGEAL REFLUX DISEASE, UNSPECIFIED WHETHER ESOPHAGITIS PRESENT: ICD-10-CM

## 2023-12-13 DIAGNOSIS — N40.1 BENIGN PROSTATIC HYPERPLASIA WITH URINARY FREQUENCY: Primary | ICD-10-CM

## 2023-12-13 DIAGNOSIS — R35.0 BENIGN PROSTATIC HYPERPLASIA WITH URINARY FREQUENCY: ICD-10-CM

## 2023-12-13 DIAGNOSIS — E03.9 HYPOTHYROIDISM, UNSPECIFIED TYPE: ICD-10-CM

## 2023-12-13 DIAGNOSIS — N40.1 BENIGN PROSTATIC HYPERPLASIA WITH URINARY FREQUENCY: ICD-10-CM

## 2023-12-13 DIAGNOSIS — D64.9 ANEMIA, UNSPECIFIED TYPE: ICD-10-CM

## 2023-12-13 DIAGNOSIS — R35.0 BENIGN PROSTATIC HYPERPLASIA WITH URINARY FREQUENCY: Primary | ICD-10-CM

## 2023-12-13 LAB
ERYTHROCYTE [DISTWIDTH] IN BLOOD BY AUTOMATED COUNT: 15.1 % (ref 11.5–14.5)
HCT VFR BLD AUTO: 42.9 % (ref 41–52)
HGB BLD-MCNC: 13.7 G/DL (ref 13.5–17.5)
MCH RBC QN AUTO: 32.3 PG (ref 26–34)
MCHC RBC AUTO-ENTMCNC: 31.9 G/DL (ref 32–36)
MCV RBC AUTO: 101 FL (ref 80–100)
NRBC BLD-RTO: 0 /100 WBCS (ref 0–0)
PLATELET # BLD AUTO: 207 X10*3/UL (ref 150–450)
PROT SERPL-MCNC: 7.5 G/DL (ref 6.4–8.2)
PSA SERPL-MCNC: 0.65 NG/ML
RBC # BLD AUTO: 4.24 X10*6/UL (ref 4.5–5.9)
TSH SERPL-ACNC: 2.42 MIU/L (ref 0.44–3.98)
WBC # BLD AUTO: 6.9 X10*3/UL (ref 4.4–11.3)

## 2023-12-13 PROCEDURE — 84165 PROTEIN E-PHORESIS SERUM: CPT

## 2023-12-13 PROCEDURE — 36415 COLL VENOUS BLD VENIPUNCTURE: CPT

## 2023-12-13 PROCEDURE — 85027 COMPLETE CBC AUTOMATED: CPT

## 2023-12-13 PROCEDURE — 86320 SERUM IMMUNOELECTROPHORESIS: CPT | Performed by: INTERNAL MEDICINE

## 2023-12-13 PROCEDURE — 1160F RVW MEDS BY RX/DR IN RCRD: CPT | Performed by: INTERNAL MEDICINE

## 2023-12-13 PROCEDURE — 1036F TOBACCO NON-USER: CPT | Performed by: INTERNAL MEDICINE

## 2023-12-13 PROCEDURE — 3078F DIAST BP <80 MM HG: CPT | Performed by: INTERNAL MEDICINE

## 2023-12-13 PROCEDURE — 84165 PROTEIN E-PHORESIS SERUM: CPT | Performed by: INTERNAL MEDICINE

## 2023-12-13 PROCEDURE — 84155 ASSAY OF PROTEIN SERUM: CPT

## 2023-12-13 PROCEDURE — 84153 ASSAY OF PSA TOTAL: CPT

## 2023-12-13 PROCEDURE — 1159F MED LIST DOCD IN RCRD: CPT | Performed by: INTERNAL MEDICINE

## 2023-12-13 PROCEDURE — 84443 ASSAY THYROID STIM HORMONE: CPT

## 2023-12-13 PROCEDURE — 3077F SYST BP >= 140 MM HG: CPT | Performed by: INTERNAL MEDICINE

## 2023-12-13 PROCEDURE — 86334 IMMUNOFIX E-PHORESIS SERUM: CPT

## 2023-12-13 PROCEDURE — 99214 OFFICE O/P EST MOD 30 MIN: CPT | Performed by: INTERNAL MEDICINE

## 2023-12-13 RX ORDER — DIPHENOXYLATE HYDROCHLORIDE AND ATROPINE SULFATE 2.5; .025 MG/1; MG/1
1 TABLET ORAL DAILY
Qty: 30 TABLET | Refills: 2 | Status: SHIPPED | OUTPATIENT
Start: 2023-12-13 | End: 2024-03-04 | Stop reason: SDUPTHER

## 2023-12-13 NOTE — PROGRESS NOTES
Subjective   Patient ID: Curtis Brown is a 82 y.o. male who presents for the following     Controlled refill 12/13/2023 and chronic care follow up    MEDICARE WELLNESS 10/25/2023   Assessment/Plan   SEPSIS ACUTE PYELONEPHROSIS 10/2023: resolved at this time    Slight anemia hgb trending 11-12s lately but 1 year ago his hgb was 15. Iron/b12/folte wnl.   Check spep      PSA elevated 6s (October 2023, not normal)  will repeat psa today if still higher recommend follow up to urology     lymphocytic colitis diarrhea: stable on diphenoxylate. increase #30 (he just had this filled on 10/10. He iwll need to come back in a month    pericardial effusion: Patient no longer on colchicine and has follow up with dr montes     Hypothyroid: tsh wnl      htn: stable, continue metoprolol.  improved, continue on   hctz to 25mg daily ,   amlodipine 10mg daily.   Losartan 50mg daily      hld: stable, continue statin. LDL < 70. Patient wants to change Lipitor to Crestor per patient's request. (Patient went to Dr Rodrigues and will get pitavastatin)     Dry macular degeneration: stable following with Dr Andrew @ [facility]     dm2: very controlled, discussed treatment of low blood sugar. Patient follows with dr rodrigues, A1c 5.7 , 5 mg tablet Amaryl daily . Watch for low sugars. Follow up with endo      PVD: stable, continue statin and aspirin      diastolic CHF and severe aortic stenosis: stable, patient is very active. stable. getting echo q6hrs with dr montes soon      BPH: patient is taking finasteride. He denies any complaints and he needs a refill       patient does not have children. needs to consider POA outs       Colonoscopy may 2021 with dr jenkins       hpi  male cad s/p stent 11/2013, PVD, htn, hld comes for the following    HX PERICARDITIS AND PERICARDIAL EFFUSION sEPTEMBER 2022: this has since resolved. and he follows with cardiology regularly for this     lymphocytic colitis (biopsy + May 18, 2021)diarrhea: patient is on  diphenoxylate/atropine for his diarrhea on a regular basis. he was seen by Dr De La Paz (who has just recently retired). he is controlled on dipheoxylate-atropine. he does on occasion during the month require an additional dose. patient continues to do well on his current management      diastolic chf: Patient denies any chest pain, angina or exertional dyspnea. patient denies any swelling to lower extremities. Patient follows with cardiology on a regular basis     echo from feb 2020 ef 60% with moderate to severe aortic valve stenosis and mild tr.      Diabetes Type 2: patient denies any low blood sugars. Patient is following with opthalmology on a yearly basis. Patient is taking glimepiride a1c 5s typically. patient following with dr christina.      hypothyroid: patient denies any hypo or hypothyroid symptoms. patient denies any palpitations, diarrhea or constipation     HLD: Patient denies any muscle aches and is tolerating statin therapy     pvd: denies any previous strokes, no headaches. no claudication     spinal stenosis: pain intermittent, in gluteal region     social: patient denies any smoking, drug or alcohol abuse     surgical history: left hip replacement 11/2017     [facility]OSCOPY MAY 2021 [facility] with 5 year follow up      HOSPITALIZATIONS  @[facility] from October 30, 2023 to November 3, 2023 patient's was hospitalized for subjective fevers and sweats.  He was found to have a UTI and left pyelonephritis.  Patient was treated for E. coli growing in his urine sensitive to most antibiotics except ampicillin and Unasyn.  Patient was recommended to continue on Cipro 500 mg twice a day for an additional 10 days posthospitalization.  He is noted to have a left solid renal cyst.  An MRI of the abdomen was done showing pyelonephritis but also a 1.6 cm left renal lesion which is characteristic of a hemorrhagic cyst.  Patient's losartan was still on hold upon discharge.        September 3  to September 7, 2022 @[facility] . Patient came in originally to [facility] for shortness of breath and chest pain. Patient was found to have acute pericarditis along with moderate-sized pericardial effusion. He was placed on colchicine twice a day and his symptoms dramatically improved. Cardiology as well as endocrinology was following him and he was discharged on colchicine.     OARRS:  No data recorded  I have personally reviewed the OARRS report for Curtis Brown. I have considered the risks of abuse, dependence, addiction and diversion    Is the patient prescribed a combination of a benzodiazepine and opioid?  Yes, I feel it is clincially indicated to continue the medication and have discussed with the patient risks/benefits/alternatives.    Last Urine Drug Screen / ordered today: Yes  Recent Results (from the past 8760 hour(s))   OPIATE/OPIOID/BENZO PRESCRIPTION COMPLIANCE    Collection Time: 03/13/23  4:02 PM   Result Value Ref Range    DRUG SCREEN COMMENT URINE SEE BELOW     Creatine, Urine 127.8 mg/dL    Amphetamine Screen, Urine PRESUMPTIVE NEGATIVE NEGATIVE    Barbiturate Screen, Urine PRESUMPTIVE NEGATIVE NEGATIVE    Cannabinoid Screen, Urine PRESUMPTIVE NEGATIVE NEGATIVE    Cocaine Screen, Urine PRESUMPTIVE NEGATIVE NEGATIVE    PCP Screen, Urine PRESUMPTIVE NEGATIVE NEGATIVE    7-Aminoclonazepam <25 Cutoff <25 ng/mL    Alpha-Hydroxyalprazolam <25 Cutoff <25 ng/mL    Alpha-Hydroxymidazolam <25 Cutoff <25 ng/mL    Alprazolam <25 Cutoff <25 ng/mL    Chlordiazepoxide <25 Cutoff <25 ng/mL    Clonazepam <25 Cutoff <25 ng/mL    Diazepam <25 Cutoff <25 ng/mL    Lorazepam <25 Cutoff <25 ng/mL    Midazolam <25 Cutoff <25 ng/mL    Nordiazepam <25 Cutoff <25 ng/mL    Oxazepam <25 Cutoff <25 ng/mL    Temazepam <25 Cutoff <25 ng/mL    Zolpidem <25 Cutoff <25 ng/mL    Zolpidem Metabolite (ZCA) <25 Cutoff <25 ng/mL    6-Acetylmorphine <25 Cutoff <25 ng/mL    Codeine <50 Cutoff <50 ng/mL    Hydrocodone <25  "Cutoff <25 ng/mL    Hydromorphone <25 Cutoff <25 ng/mL    Morphine Urine <50 Cutoff <50 ng/mL    Norhydrocodone <25 Cutoff <25 ng/mL    Noroxycodone <25 Cutoff <25 ng/mL    Oxycodone <25 Cutoff <25 ng/mL    Oxymorphone <25 Cutoff <25 ng/mL    Tramadol <50 Cutoff <50 ng/mL    O-Desmethyltramadol <50 Cutoff <50 ng/mL    Fentanyl <2.5 Cutoff<2.5 ng/mL    Norfentanyl <2.5 Cutoff<2.5 ng/mL    METHADONE CONFIRMATION,URINE <25 Cutoff <25 ng/mL    EDDP <25 Cutoff <25 ng/mL     Results are as expected.     Controlled Substance Agreement:  Date of the Last Agreement:   3/13/2023     Reviewed Controlled Substance Agreement including but not limited to the benefits, risks, and alternatives to treatment with a Controlled Substance medication(s).    Antidiarrheal:   What is the patient's goal of therapy?  Improve diarrhea   Is this being achieved with current treatment? yes  Is the patient currently prescribed an opioid, and/or benzodiazepine?   Yes, I feel it is clincially indicated to continue the medication and have discussed with the patient risks/benefits/alternatives.    Activities of Daily Living:   Is your overall impression that this patient is benefiting (symptom reduction outweighs side effects) from antidiarrheal therapy? No     1. Physical Functioning: Better  2. Family Relationship: Better  3. Social Relationship: Better  4. Mood: Better  5. Sleep Patterns: Better  6. Overall Function: Better      Visit Vitals  /73   Pulse 66   Ht 1.727 m (5' 8\")   Wt 86.6 kg (191 lb)   SpO2 98% Comment: RA   BMI 29.04 kg/m²   Smoking Status Never   BSA 2.04 m²     PHYSICAL EXAM   General appearance: Alert and in no acute distress. speech is clear and coherent  No head trauma  Neck: no carotid bruits or thyromegaly. no lymphadenopathy   Respiratory : No respiratory distress, normal respiratory rhythm and effort. Clear bilateral breath sounds. No wheezing or rhonchi.   Cardiovascular: heart rate regular, S1, S2. no murmurs. no " Lower extremity edema  Skin inspection: Normal skin color and pigmentation, normal skin turgor and no visible rash, induration, or cellulitis  MSK: 5/5 strength upper and lower extremities without gait abnormalities. no loss of muscle mass   Neuro: 2-12 CN grossly intact.  no slurred speech. no lateralizing deficit  Psychiatric Orientation: Oriented to person, place, and time. no depression, homicidal or suicidal thoughts, normal affect     REVIEW OF SYSTEMS     Constitutional: not feeling tired and no fever, chills or sweats. Denies weight loss  no headache  Cardiovascular: no exertional chest pain, no palpitations, no lower extremity edema and no intermittent leg claudication.   Lungs: Denies shortness of breath, exertional dyspnea, wheezing  Gastrointestinal: no change in bowel habits, no diarrhea, no nausea, no vomiting and no abdominal pain. Denies Melena, brbpr or dark stool  Musculoskeletal: no myalgias, no muscle weakness and no limb swelling.   Skin: no rashes, no change in skin color and pigmentation and no skin lumps.   Neurological: no headaches, no seizures, no numbness, no lateralizing deficits and no fainting.   Psychiatric: no depression and no anxiety.   Urine: denies polyuria, hematuria, dysuria      Allergies   Allergen Reactions    Finasteride Unknown    Morphine Unknown    Penicillins Unknown    Valacyclovir Rash       Current Outpatient Medications   Medication Sig Dispense Refill    Accu-Chek Pippa Plus test strp strip USE TO TEST ONCE DAILY.      Accu-Chek Fastclix Lancet Drum misc 1 Lancet early in the morning..      amLODIPine (Norvasc) 10 mg tablet Take 1 tablet (10 mg) by mouth once daily. 90 tablet 3    aspirin 81 mg EC tablet Take 1 tablet (81 mg) by mouth once daily.      biotin 1 mg capsule Take by mouth.      calcium carbonate-vit D3-min 600 mg calcium- 400 unit tablet Take by mouth.      calcium carbonate-vitamin D3 (Caltrate with Vitamin D3) 600 mg-20 mcg (800 unit) tablet Take 1  tablet by mouth once daily. With breakfast      clindamycin (Cleocin) 300 mg capsule Take 1 capsule (300 mg) by mouth. 1 HOUR BEFORE PROCEDURE AND 6 HOURS AFTER PROCEDURE AS DIRECTED      coenzyme Q-10 300 mg capsule capsule Take by mouth.      colchicine 0.6 mg tablet Take 1 tablet (0.6 mg) by mouth twice a day.      cyanocobalamin (Vitamin B-12) 100 mcg tablet Take by mouth.      diphenoxylate-atropine (Lomotil) 2.5-0.025 mg tablet Take 1 tablet by mouth once daily. With additional tablet in evening if needed 30 tablet 2    eye vitamin supplement (Ocuvite Eye Health Formula) capsule Take 1 tablet by mouth once daily.      famotidine (Pepcid) 40 mg tablet Take 1 tablet (40 mg) by mouth 2 times a day. 180 tablet 3    finasteride (Proscar) 5 mg tablet Take 1 tablet (5 mg) by mouth once daily. 90 tablet 3    glimepiride (Amaryl) 1 mg tablet Take by mouth.      hydroCHLOROthiazide (HYDRODiuril) 25 mg tablet Take 1 tablet (25 mg) by mouth once daily. 90 tablet 3    lactobacillus acidophilus capsule Take 1 capsule by mouth once daily. With breakfast      Livalo 2 mg tablet       losartan (Cozaar) 50 mg tablet Take 1 tablet (50 mg) by mouth once daily.      metoprolol tartrate (Lopressor) 25 mg tablet Take 1 tablet (25 mg) by mouth 2 times a day.      multivit-min/folic/vit K/lycop (ONE-A-DAY MEN'S MULTIVITAMIN ORAL) Take by mouth. VitaCraves Oral Tablet Chewable      multivitamin capsule Take by mouth.      nitroglycerin (Nitrostat) 0.4 mg SL tablet Place 1 tablet (0.4 mg) under the tongue every 5 minutes if needed for chest pain. Under the tongue as needed for chest pain 25 tablet 2    potassium chloride CR (K-Tab) 20 mEq ER tablet Take by mouth.      pravastatin (Pravachol) 20 mg tablet Take 1 tablet (20 mg) by mouth once daily.      predniSONE (Deltasone) 10 mg tablet Take 1 tablet (10 mg) by mouth once daily.      psyllium (Metamucil) powder Take by mouth.      ubidecarenone/vitamin E (COENZYME Q10-VITAMIN E ORAL)  Take by mouth.      vits A,C,E/lutein/minerals (OCUVITE WITH LUTEIN ORAL) Take 1 tablet by mouth once daily with a meal.       No current facility-administered medications for this visit.       Objective     Lab on 11/15/2023   Component Date Value Ref Range Status    WBC 11/15/2023 7.3  4.4 - 11.3 x10*3/uL Final    nRBC 11/15/2023 0.0  0.0 - 0.0 /100 WBCs Final    RBC 11/15/2023 4.27 (L)  4.50 - 5.90 x10*6/uL Final    Hemoglobin 11/15/2023 13.8  13.5 - 17.5 g/dL Final    Hematocrit 11/15/2023 43.5  41.0 - 52.0 % Final    MCV 11/15/2023 102 (H)  80 - 100 fL Final    MCH 11/15/2023 32.3  26.0 - 34.0 pg Final    MCHC 11/15/2023 31.7 (L)  32.0 - 36.0 g/dL Final    RDW 11/15/2023 14.1  11.5 - 14.5 % Final    Platelets 11/15/2023 308  150 - 450 x10*3/uL Final    Ferritin 11/15/2023 516 (H)  20 - 300 ng/mL Final    Folate, Serum 11/15/2023 >24.0  >5.0 ng/mL Final    Iron 11/15/2023 97  35 - 150 ug/dL Final    UIBC 11/15/2023 351  110 - 370 ug/dL Final    TIBC 11/15/2023 448 (H)  240 - 445 ug/dL Final    % Saturation 11/15/2023 22 (L)  25 - 45 % Final    Vitamin B12 11/15/2023 1,529 (H)  211 - 911 pg/mL Final    Glucose 11/15/2023 100 (H)  74 - 99 mg/dL Final    Sodium 11/15/2023 140  136 - 145 mmol/L Final    Potassium 11/15/2023 4.3  3.5 - 5.3 mmol/L Final    Chloride 11/15/2023 101  98 - 107 mmol/L Final    Bicarbonate 11/15/2023 31  21 - 32 mmol/L Final    Anion Gap 11/15/2023 12  10 - 20 mmol/L Final    Urea Nitrogen 11/15/2023 21  6 - 23 mg/dL Final    Creatinine 11/15/2023 1.11  0.50 - 1.30 mg/dL Final    eGFR 11/15/2023 67  >60 mL/min/1.73m*2 Final    Calcium 11/15/2023 9.7  8.6 - 10.6 mg/dL Final    Total Protein 11/15/2023 7.1  6.4 - 8.2 g/dL Final    Albumin 11/15/2023 4.1  3.4 - 5.0 g/dL Final    Alpha 1 Globulin 11/15/2023 0.3  0.2 - 0.6 g/dL Final    Alpha 2 Globulin 11/15/2023 1.0  0.4 - 1.1 g/dL Final    Beta Globulin 11/15/2023 0.8  0.5 - 1.2 g/dL Final    Gamma 11/15/2023 0.9  0.5 - 1.4 g/dL Final     Protein Electrophoresis Comment 11/15/2023 Aberrant band detected. See immunofixation.   Final    Path Review - Serum Protein Electr* 11/15/2023 Reviewed and approved by MARISSA LINDER on 11/19/23 at 9:24 AM.      Final    Total Protein, Urine Random 11/15/2023 9  5 - 25 mg/dL Final    Albumin % 11/15/2023 55.8  % Final    Alpha 1 Globulin % 11/15/2023 12.7  % Final    Alpha 2 Globulin % 11/15/2023 8.3  % Final    Beta Globulin % 11/15/2023 16.4  % Final    Gamma Globulin % 11/15/2023 6.8  % Final    Urine Electrophoresis Comment 11/15/2023 Normal.      Final    Path Review-Urine Protein Electrop* 11/15/2023 Reviewed and approved by MARISSA LINDER on 11/20/23 at 7:33 PM.      Final    Immunofixation Comment 11/15/2023 Vague free lambda light chains in the gamma region; not definitively monoclonal.  Repeat testing is recommended after the resolution of any acute illness to monitor the evolution of this band.         Final    Path Review - Serum Immunofixation 11/15/2023 Reviewed and approved by MARISSA LINDER on 11/19/23 at 9:25 AM.      Final    Path Review - Urine Immunofixation 11/15/2023 Reviewed and approved by MARISSA LINDER on 11/20/23 at 7:33 PM.       Final    Immunofixation Comment 11/15/2023 No monoclonal protein detected by immunofixation.   Final   Orders Only on 10/27/2023   Component Date Value Ref Range Status    NON-[facility] HIE Prostatic Specific Anti* 10/27/2023 6.2 (H)  0.0 - 4.0 ng/mL Final   Hospital Outpatient Visit on 10/09/2023   Component Date Value Ref Range Status    LV A4C EF 10/09/2023 57.5   Final       Radiology: Reviewed imaging in powerchart.  No results found.    Family History   Problem Relation Name Age of Onset    Stroke Mother      Heart failure Mother      Other (hepatic cirrhosis) Father      Parkinsonism Brother       Social History     Socioeconomic History    Marital status:      Spouse name: Not on file    Number of children: Not on file    Years of education: Not on  file    Highest education level: Not on file   Occupational History    Not on file   Tobacco Use    Smoking status: Never    Smokeless tobacco: Never   Substance and Sexual Activity    Alcohol use: Never    Drug use: Never    Sexual activity: Not on file   Other Topics Concern    Not on file   Social History Narrative    Not on file     Social Determinants of Health     Financial Resource Strain: Not on file   Food Insecurity: Not on file   Transportation Needs: Not on file   Physical Activity: Not on file   Stress: Not on file   Social Connections: Not on file   Intimate Partner Violence: Not on file   Housing Stability: Not on file     Past Medical History:   Diagnosis Date    Abnormal result of other cardiovascular function study     Abnormal nuclear stress test    Acute ischemic heart disease, unspecified (CMS/HCC)     ACS (acute coronary syndrome)    Atherosclerotic heart disease of native coronary artery without angina pectoris     Arteriosclerotic coronary artery disease    Chronic sinusitis, unspecified     Chronic congestion of paranasal sinus    Coronary angioplasty status     History of percutaneous coronary intervention    Encounter for preprocedural cardiovascular examination     Pre-operative cardiovascular examination    Encounter for screening for malignant neoplasm of prostate     Screening PSA (prostate specific antigen)    Lyme disease, unspecified 12/02/2013    Lyme disease    Other cervical disc displacement, unspecified cervical region     Herniated cervical disc without myelopathy    Other specified postprocedural states     History of carpal tunnel surgery    Other systemic sclerosis (CMS/HCC)     Cutaneous scleroderma    Personal history of malignant neoplasm, unspecified     History of malignant neoplasm    Personal history of other diseases of the circulatory system     History of hypertension    Personal history of other diseases of the circulatory system     History of abnormal  "electrocardiography    Personal history of other diseases of the digestive system     History of gastroesophageal reflux (GERD)    Personal history of other diseases of the musculoskeletal system and connective tissue     History of low back pain    Personal history of other diseases of the musculoskeletal system and connective tissue     History of spinal stenosis    Personal history of other diseases of the musculoskeletal system and connective tissue     History of arthritis    Personal history of other endocrine, nutritional and metabolic disease     History of hypothyroidism    Personal history of other endocrine, nutritional and metabolic disease     History of hyperlipidemia    Personal history of other endocrine, nutritional and metabolic disease     History of type 2 diabetes mellitus    Personal history of other endocrine, nutritional and metabolic disease     History of hypoglycemia    Personal history of other specified conditions     History of chest pain    Polyp of stomach and duodenum     Gastric polyp     Past Surgical History:   Procedure Laterality Date    CORONARY ANGIOPLASTY WITH STENT PLACEMENT  07/27/2018    Cath Placement Of Stent 1    HAND SURGERY  07/27/2018    Hand Surgery                                                                                                                                                          OTHER SURGICAL HISTORY  07/27/2018    Surgery    OTHER SURGICAL HISTORY  07/27/2018    Total Hip Replacement Left       Charting was completed using voice recognition technology and may include unintended errors.     Subjective   Patient ID: Curtis Brown is a 82 y.o. male who presents for the following    Assessment/Plan       HPI      Visit Vitals  /73   Pulse 66   Ht 1.727 m (5' 8\")   Wt 86.6 kg (191 lb)   SpO2 98% Comment: RA   BMI 29.04 kg/m²   Smoking Status Never   BSA 2.04 m²     PHYSICAL EXAM       REVIEW OF SYSTEMS       Allergies   Allergen Reactions "    Finasteride Unknown    Morphine Unknown    Penicillins Unknown    Valacyclovir Rash       Current Outpatient Medications   Medication Sig Dispense Refill    Accu-Chek Pippa Plus test strp strip USE TO TEST ONCE DAILY.      Accu-Chek Fastclix Lancet Drum misc 1 Lancet early in the morning..      amLODIPine (Norvasc) 10 mg tablet Take 1 tablet (10 mg) by mouth once daily. 90 tablet 3    aspirin 81 mg EC tablet Take 1 tablet (81 mg) by mouth once daily.      biotin 1 mg capsule Take by mouth.      calcium carbonate-vit D3-min 600 mg calcium- 400 unit tablet Take by mouth.      calcium carbonate-vitamin D3 (Caltrate with Vitamin D3) 600 mg-20 mcg (800 unit) tablet Take 1 tablet by mouth once daily. With breakfast      clindamycin (Cleocin) 300 mg capsule Take 1 capsule (300 mg) by mouth. 1 HOUR BEFORE PROCEDURE AND 6 HOURS AFTER PROCEDURE AS DIRECTED      coenzyme Q-10 300 mg capsule capsule Take by mouth.      colchicine 0.6 mg tablet Take 1 tablet (0.6 mg) by mouth twice a day.      cyanocobalamin (Vitamin B-12) 100 mcg tablet Take by mouth.      diphenoxylate-atropine (Lomotil) 2.5-0.025 mg tablet Take 1 tablet by mouth once daily. With additional tablet in evening if needed 30 tablet 2    eye vitamin supplement (Ocuvite Eye Health Formula) capsule Take 1 tablet by mouth once daily.      famotidine (Pepcid) 40 mg tablet Take 1 tablet (40 mg) by mouth 2 times a day. 180 tablet 3    finasteride (Proscar) 5 mg tablet Take 1 tablet (5 mg) by mouth once daily. 90 tablet 3    glimepiride (Amaryl) 1 mg tablet Take by mouth.      hydroCHLOROthiazide (HYDRODiuril) 25 mg tablet Take 1 tablet (25 mg) by mouth once daily. 90 tablet 3    lactobacillus acidophilus capsule Take 1 capsule by mouth once daily. With breakfast      Livalo 2 mg tablet       losartan (Cozaar) 50 mg tablet Take 1 tablet (50 mg) by mouth once daily.      metoprolol tartrate (Lopressor) 25 mg tablet Take 1 tablet (25 mg) by mouth 2 times a day.       multivit-min/folic/vit K/lycop (ONE-A-DAY MEN'S MULTIVITAMIN ORAL) Take by mouth. VitaCraves Oral Tablet Chewable      multivitamin capsule Take by mouth.      nitroglycerin (Nitrostat) 0.4 mg SL tablet Place 1 tablet (0.4 mg) under the tongue every 5 minutes if needed for chest pain. Under the tongue as needed for chest pain 25 tablet 2    potassium chloride CR (K-Tab) 20 mEq ER tablet Take by mouth.      pravastatin (Pravachol) 20 mg tablet Take 1 tablet (20 mg) by mouth once daily.      predniSONE (Deltasone) 10 mg tablet Take 1 tablet (10 mg) by mouth once daily.      psyllium (Metamucil) powder Take by mouth.      ubidecarenone/vitamin E (COENZYME Q10-VITAMIN E ORAL) Take by mouth.      vits A,C,E/lutein/minerals (OCUVITE WITH LUTEIN ORAL) Take 1 tablet by mouth once daily with a meal.       No current facility-administered medications for this visit.       Objective     Lab on 11/15/2023   Component Date Value Ref Range Status    WBC 11/15/2023 7.3  4.4 - 11.3 x10*3/uL Final    nRBC 11/15/2023 0.0  0.0 - 0.0 /100 WBCs Final    RBC 11/15/2023 4.27 (L)  4.50 - 5.90 x10*6/uL Final    Hemoglobin 11/15/2023 13.8  13.5 - 17.5 g/dL Final    Hematocrit 11/15/2023 43.5  41.0 - 52.0 % Final    MCV 11/15/2023 102 (H)  80 - 100 fL Final    MCH 11/15/2023 32.3  26.0 - 34.0 pg Final    MCHC 11/15/2023 31.7 (L)  32.0 - 36.0 g/dL Final    RDW 11/15/2023 14.1  11.5 - 14.5 % Final    Platelets 11/15/2023 308  150 - 450 x10*3/uL Final    Ferritin 11/15/2023 516 (H)  20 - 300 ng/mL Final    Folate, Serum 11/15/2023 >24.0  >5.0 ng/mL Final    Iron 11/15/2023 97  35 - 150 ug/dL Final    UIBC 11/15/2023 351  110 - 370 ug/dL Final    TIBC 11/15/2023 448 (H)  240 - 445 ug/dL Final    % Saturation 11/15/2023 22 (L)  25 - 45 % Final    Vitamin B12 11/15/2023 1,529 (H)  211 - 911 pg/mL Final    Glucose 11/15/2023 100 (H)  74 - 99 mg/dL Final    Sodium 11/15/2023 140  136 - 145 mmol/L Final    Potassium 11/15/2023 4.3  3.5 - 5.3 mmol/L  Final    Chloride 11/15/2023 101  98 - 107 mmol/L Final    Bicarbonate 11/15/2023 31  21 - 32 mmol/L Final    Anion Gap 11/15/2023 12  10 - 20 mmol/L Final    Urea Nitrogen 11/15/2023 21  6 - 23 mg/dL Final    Creatinine 11/15/2023 1.11  0.50 - 1.30 mg/dL Final    eGFR 11/15/2023 67  >60 mL/min/1.73m*2 Final    Calcium 11/15/2023 9.7  8.6 - 10.6 mg/dL Final    Total Protein 11/15/2023 7.1  6.4 - 8.2 g/dL Final    Albumin 11/15/2023 4.1  3.4 - 5.0 g/dL Final    Alpha 1 Globulin 11/15/2023 0.3  0.2 - 0.6 g/dL Final    Alpha 2 Globulin 11/15/2023 1.0  0.4 - 1.1 g/dL Final    Beta Globulin 11/15/2023 0.8  0.5 - 1.2 g/dL Final    Gamma 11/15/2023 0.9  0.5 - 1.4 g/dL Final    Protein Electrophoresis Comment 11/15/2023 Aberrant band detected. See immunofixation.   Final    Path Review - Serum Protein Electr* 11/15/2023 Reviewed and approved by MARISSA LINDER on 11/19/23 at 9:24 AM.      Final    Total Protein, Urine Random 11/15/2023 9  5 - 25 mg/dL Final    Albumin % 11/15/2023 55.8  % Final    Alpha 1 Globulin % 11/15/2023 12.7  % Final    Alpha 2 Globulin % 11/15/2023 8.3  % Final    Beta Globulin % 11/15/2023 16.4  % Final    Gamma Globulin % 11/15/2023 6.8  % Final    Urine Electrophoresis Comment 11/15/2023 Normal.      Final    Path Review-Urine Protein Electrop* 11/15/2023 Reviewed and approved by MARISSA LINDER on 11/20/23 at 7:33 PM.      Final    Immunofixation Comment 11/15/2023 Vague free lambda light chains in the gamma region; not definitively monoclonal.  Repeat testing is recommended after the resolution of any acute illness to monitor the evolution of this band.         Final    Path Review - Serum Immunofixation 11/15/2023 Reviewed and approved by MARISSA LINDER on 11/19/23 at 9:25 AM.      Final    Path Review - Urine Immunofixation 11/15/2023 Reviewed and approved by MARISSA LINDER on 11/20/23 at 7:33 PM.       Final    Immunofixation Comment 11/15/2023 No monoclonal protein detected by  immunofixation.   Final   Orders Only on 10/27/2023   Component Date Value Ref Range Status    NON-[facility] HIE Prostatic Specific Anti* 10/27/2023 6.2 (H)  0.0 - 4.0 ng/mL Final   Hospital Outpatient Visit on 10/09/2023   Component Date Value Ref Range Status    LV A4C EF 10/09/2023 57.5   Final       Radiology: Reviewed imaging in powerchart.  No results found.    Family History   Problem Relation Name Age of Onset    Stroke Mother      Heart failure Mother      Other (hepatic cirrhosis) Father      Parkinsonism Brother       Social History     Socioeconomic History    Marital status:      Spouse name: Not on file    Number of children: Not on file    Years of education: Not on file    Highest education level: Not on file   Occupational History    Not on file   Tobacco Use    Smoking status: Never    Smokeless tobacco: Never   Substance and Sexual Activity    Alcohol use: Never    Drug use: Never    Sexual activity: Not on file   Other Topics Concern    Not on file   Social History Narrative    Not on file     Social Determinants of Health     Financial Resource Strain: Not on file   Food Insecurity: Not on file   Transportation Needs: Not on file   Physical Activity: Not on file   Stress: Not on file   Social Connections: Not on file   Intimate Partner Violence: Not on file   Housing Stability: Not on file     Past Medical History:   Diagnosis Date    Abnormal result of other cardiovascular function study     Abnormal nuclear stress test    Acute ischemic heart disease, unspecified (CMS/HCC)     ACS (acute coronary syndrome)    Atherosclerotic heart disease of native coronary artery without angina pectoris     Arteriosclerotic coronary artery disease    Chronic sinusitis, unspecified     Chronic congestion of paranasal sinus    Coronary angioplasty status     History of percutaneous coronary intervention    Encounter for preprocedural cardiovascular examination     Pre-operative cardiovascular examination     Encounter for screening for malignant neoplasm of prostate     Screening PSA (prostate specific antigen)    Lyme disease, unspecified 12/02/2013    Lyme disease    Other cervical disc displacement, unspecified cervical region     Herniated cervical disc without myelopathy    Other specified postprocedural states     History of carpal tunnel surgery    Other systemic sclerosis (CMS/HCC)     Cutaneous scleroderma    Personal history of malignant neoplasm, unspecified     History of malignant neoplasm    Personal history of other diseases of the circulatory system     History of hypertension    Personal history of other diseases of the circulatory system     History of abnormal electrocardiography    Personal history of other diseases of the digestive system     History of gastroesophageal reflux (GERD)    Personal history of other diseases of the musculoskeletal system and connective tissue     History of low back pain    Personal history of other diseases of the musculoskeletal system and connective tissue     History of spinal stenosis    Personal history of other diseases of the musculoskeletal system and connective tissue     History of arthritis    Personal history of other endocrine, nutritional and metabolic disease     History of hypothyroidism    Personal history of other endocrine, nutritional and metabolic disease     History of hyperlipidemia    Personal history of other endocrine, nutritional and metabolic disease     History of type 2 diabetes mellitus    Personal history of other endocrine, nutritional and metabolic disease     History of hypoglycemia    Personal history of other specified conditions     History of chest pain    Polyp of stomach and duodenum     Gastric polyp     Past Surgical History:   Procedure Laterality Date    CORONARY ANGIOPLASTY WITH STENT PLACEMENT  07/27/2018    Cath Placement Of Stent 1    HAND SURGERY  07/27/2018    Hand Surgery                                                                                                                                                           OTHER SURGICAL HISTORY  07/27/2018    Surgery    OTHER SURGICAL HISTORY  07/27/2018    Total Hip Replacement Left       Charting was completed using voice recognition technology and may include unintended errors.

## 2023-12-18 ENCOUNTER — DOCUMENTATION (OUTPATIENT)
Dept: CARE COORDINATION | Facility: CLINIC | Age: 83
End: 2023-12-18
Payer: MEDICARE

## 2023-12-18 LAB
ALBUMIN: 4.8 G/DL (ref 3.4–5)
ALPHA 1 GLOBULIN: 0.3 G/DL (ref 0.2–0.6)
ALPHA 2 GLOBULIN: 0.9 G/DL (ref 0.4–1.1)
BETA GLOBULIN: 0.8 G/DL (ref 0.5–1.2)
GAMMA GLOBULIN: 0.8 G/DL (ref 0.5–1.4)
IMMUNOFIXATION COMMENT: NORMAL
PATH REVIEW - SERUM IMMUNOFIXATION: NORMAL
PATH REVIEW-SERUM PROTEIN ELECTROPHORESIS: NORMAL
PROTEIN ELECTROPHORESIS COMMENT: NORMAL

## 2023-12-18 NOTE — PROGRESS NOTES
Outreach call to patient to check in 30 days after hospital discharge  ([facility] ) to support smooth transition of care.  No readmissions. Patient has followed up with  his PCP and has been in communication with  the LPN at Dr. Gonzalez office. Left  voicemail for patient on this day.         Carlita Spivey , RN   Nurse Care Manager   [facility] Department   [phone]

## 2024-01-04 ENCOUNTER — TELEPHONE (OUTPATIENT)
Dept: PRIMARY CARE | Facility: CLINIC | Age: 84
End: 2024-01-04
Payer: MEDICARE

## 2024-03-04 ENCOUNTER — OFFICE VISIT (OUTPATIENT)
Dept: PRIMARY CARE | Facility: CLINIC | Age: 84
End: 2024-03-04
Payer: MEDICARE

## 2024-03-04 VITALS
DIASTOLIC BLOOD PRESSURE: 77 MMHG | SYSTOLIC BLOOD PRESSURE: 152 MMHG | WEIGHT: 192 LBS | OXYGEN SATURATION: 96 % | HEART RATE: 73 BPM | HEIGHT: 68 IN | BODY MASS INDEX: 29.1 KG/M2

## 2024-03-04 DIAGNOSIS — E11.69 HYPERLIPIDEMIA ASSOCIATED WITH TYPE 2 DIABETES MELLITUS (MULTI): ICD-10-CM

## 2024-03-04 DIAGNOSIS — K52.832 LYMPHOCYTIC COLITIS: Primary | ICD-10-CM

## 2024-03-04 DIAGNOSIS — K21.9 GASTROESOPHAGEAL REFLUX DISEASE, UNSPECIFIED WHETHER ESOPHAGITIS PRESENT: ICD-10-CM

## 2024-03-04 DIAGNOSIS — M34.9: ICD-10-CM

## 2024-03-04 DIAGNOSIS — E78.5 HYPERLIPIDEMIA ASSOCIATED WITH TYPE 2 DIABETES MELLITUS (MULTI): ICD-10-CM

## 2024-03-04 DIAGNOSIS — I73.9 PVD (PERIPHERAL VASCULAR DISEASE) (CMS-HCC): ICD-10-CM

## 2024-03-04 DIAGNOSIS — G25.2 RESTING TREMOR: ICD-10-CM

## 2024-03-04 DIAGNOSIS — I50.32 CHRONIC DIASTOLIC CONGESTIVE HEART FAILURE (MULTI): ICD-10-CM

## 2024-03-04 PROCEDURE — 3078F DIAST BP <80 MM HG: CPT | Performed by: INTERNAL MEDICINE

## 2024-03-04 PROCEDURE — 1159F MED LIST DOCD IN RCRD: CPT | Performed by: INTERNAL MEDICINE

## 2024-03-04 PROCEDURE — G2211 COMPLEX E/M VISIT ADD ON: HCPCS | Performed by: INTERNAL MEDICINE

## 2024-03-04 PROCEDURE — 99214 OFFICE O/P EST MOD 30 MIN: CPT | Performed by: INTERNAL MEDICINE

## 2024-03-04 PROCEDURE — 3077F SYST BP >= 140 MM HG: CPT | Performed by: INTERNAL MEDICINE

## 2024-03-04 PROCEDURE — 1036F TOBACCO NON-USER: CPT | Performed by: INTERNAL MEDICINE

## 2024-03-04 RX ORDER — DIPHENOXYLATE HYDROCHLORIDE AND ATROPINE SULFATE 2.5; .025 MG/1; MG/1
1 TABLET ORAL DAILY
Qty: 30 TABLET | Refills: 2 | Status: SHIPPED | OUTPATIENT
Start: 2024-03-04

## 2024-03-04 RX ORDER — PRIMIDONE 50 MG/1
50 TABLET ORAL DAILY
Qty: 90 TABLET | Refills: 3 | Status: SHIPPED | OUTPATIENT
Start: 2024-03-04 | End: 2025-03-04

## 2024-03-04 NOTE — PROGRESS NOTES
Subjective   Patient ID: Curtis Brown is a 83 y.o. male who presents for the following     Controlled refill (lomotil) 3/4/2024  and chronic care follow up    MEDICARE WELLNESS 10/25/2023   Assessment/Plan   lymphocytic colitis diarrhea: stable on diphenoxylate  #30    Sent to [facility] [place] urine pain panel     Hx SEPSIS ACUTE PYELONEPHROSIS 10/2023: resolved at this time    Slight anemia hgb trending 11-12s lately but 1 year ago his hgb was 15. Iron/b12/folte wnl.     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  improved, continue on   hctz to 25mg daily ,   amlodipine 10mg daily.   Losartan 50mg daily      hld: stable, continue statin. LDL < 70. Patient wants to change Lipitor to Crestor per patient's request. (Patient went to Dr Rodrigues and will get pitavastatin)     Dry macular degeneration: stable following with Dr Andrew @ [facility]     dm2: stable, 6s    Patient follows with dr rodrigues   5 mg tablet Amaryl daily . Watch for low sugars.      PVD: stable, continue statin and aspirin      diastolic CHF and severe aortic stenosis: stable, patient is very active. stable. getting echo with dr montes twice a year      BPH: patient is taking finasteride. He denies any complaints and he needs a refill       patient does not have children. needs to consider POA outs       Colonoscopy may 2021 with dr jenkins       hpi  male cad s/p stent 11/2013, PVD, htn, hld comes for the following    HX PERICARDITIS AND PERICARDIAL EFFUSION sEPTEMBER 2022: this has since resolved. and he follows with cardiology regularly for this     Resting tremors: worsening in his hands and he is UNABLE TO  hold objects in his hands.     lymphocytic colitis (biopsy + May 18, 2021)diarrhea: patient is on  diphenoxylate/atropine for his diarrhea on a regular basis. he was seen by Dr De La Paz (who has just recently retired). he is controlled on dipheoxylate-atropine. he does on occasion during the month require an additional dose. patient continues to do well on his current management      diastolic chf: Patient denies any chest pain, angina or exertional dyspnea. patient denies any swelling to lower extremities. Patient follows with cardiology on a regular basis     echo from feb 2020 ef 60% with moderate to severe aortic valve stenosis and mild tr.      Diabetes Type 2: patient denies any low blood sugars. Patient is following with opthalmology on a yearly basis. Patient is taking glimepiride a1c 5s typically. patient following with dr christina.      hypothyroid: patient denies any hypo or hypothyroid symptoms. patient denies any palpitations, diarrhea or constipation     HLD: Patient denies any muscle aches and is tolerating statin therapy     pvd: denies any previous strokes, no headaches. no claudication     spinal stenosis: pain intermittent, in gluteal region     social: patient denies any smoking, drug or alcohol abuse     surgical history: left hip replacement 11/2017     COL[facility]OSCOPY MAY 2021 AT [facility] with 5 year follow up      HOSPITALIZATIONS  @[facility] from October 30, 2023 to November 3, 2023 patient's was hospitalized for subjective fevers and sweats.  He was found to have a UTI and left pyelonephritis.  Patient was treated for E. coli growing in his urine sensitive to most antibiotics except ampicillin and Unasyn.  Patient was recommended to continue on Cipro 500 mg twice a day for an additional 10 days posthospitalization.  He is noted to have a left solid renal cyst.  An MRI of the abdomen was done showing pyelonephritis but also a 1.6 cm left renal lesion which is characteristic of a hemorrhagic cyst.  Patient's losartan was still on hold upon discharge.        September 3  to September 7, 2022 @[facility] . Patient came in originally to [facility] for shortness of breath and chest pain. Patient was found to have acute pericarditis along with moderate-sized pericardial effusion. He was placed on colchicine twice a day and his symptoms dramatically improved. Cardiology as well as endocrinology was following him and he was discharged on colchicine.     OARRS:  No data recorded  I have personally reviewed the OARRS report for Curtis Brown. I have considered the risks of abuse, dependence, addiction and diversion    Is the patient prescribed a combination of a benzodiazepine and opioid?  Yes, I feel it is clincially indicated to continue the medication and have discussed with the patient risks/benefits/alternatives.    Last Urine Drug Screen / ordered today: Yes  Recent Results (from the past 8760 hour(s))   OPIATE/OPIOID/BENZO PRESCRIPTION COMPLIANCE    Collection Time: 03/13/23  4:02 PM   Result Value Ref Range    DRUG SCREEN COMMENT URINE SEE BELOW     Creatine, Urine 127.8 mg/dL    Amphetamine Screen, Urine PRESUMPTIVE NEGATIVE NEGATIVE    Barbiturate Screen, Urine PRESUMPTIVE NEGATIVE NEGATIVE    Cannabinoid Screen, Urine PRESUMPTIVE NEGATIVE NEGATIVE    Cocaine Screen, Urine PRESUMPTIVE NEGATIVE NEGATIVE    PCP Screen, Urine PRESUMPTIVE NEGATIVE NEGATIVE    7-Aminoclonazepam <25 Cutoff <25 ng/mL    Alpha-Hydroxyalprazolam <25 Cutoff <25 ng/mL    Alpha-Hydroxymidazolam <25 Cutoff <25 ng/mL    Alprazolam <25 Cutoff <25 ng/mL    Chlordiazepoxide <25 Cutoff <25 ng/mL    Clonazepam <25 Cutoff <25 ng/mL    Diazepam <25 Cutoff <25 ng/mL    Lorazepam <25 Cutoff <25 ng/mL    Midazolam <25 Cutoff <25 ng/mL    Nordiazepam <25 Cutoff <25 ng/mL    Oxazepam <25 Cutoff <25 ng/mL    Temazepam <25 Cutoff <25 ng/mL    Zolpidem <25 Cutoff <25 ng/mL    Zolpidem Metabolite (ZCA) <25 Cutoff <25 ng/mL    6-Acetylmorphine <25 Cutoff <25 ng/mL    Codeine <50 Cutoff <50 ng/mL    Hydrocodone <25  Cutoff <25 ng/mL    Hydromorphone <25 Cutoff <25 ng/mL    Morphine Urine <50 Cutoff <50 ng/mL    Norhydrocodone <25 Cutoff <25 ng/mL    Noroxycodone <25 Cutoff <25 ng/mL    Oxycodone <25 Cutoff <25 ng/mL    Oxymorphone <25 Cutoff <25 ng/mL    Tramadol <50 Cutoff <50 ng/mL    O-Desmethyltramadol <50 Cutoff <50 ng/mL    Fentanyl <2.5 Cutoff<2.5 ng/mL    Norfentanyl <2.5 Cutoff<2.5 ng/mL    METHADONE CONFIRMATION,URINE <25 Cutoff <25 ng/mL    EDDP <25 Cutoff <25 ng/mL     Results are as expected.     Controlled Substance Agreement:  Date of the Last Agreement:   3/13/2023     Reviewed Controlled Substance Agreement including but not limited to the benefits, risks, and alternatives to treatment with a Controlled Substance medication(s).    Antidiarrheal:   What is the patient's goal of therapy?  Improve diarrhea   Is this being achieved with current treatment? yes  Is the patient currently prescribed an opioid, and/or benzodiazepine?   Yes, I feel it is clincially indicated to continue the medication and have discussed with the patient risks/benefits/alternatives.    Activities of Daily Living:   Is your overall impression that this patient is benefiting (symptom reduction outweighs side effects) from antidiarrheal therapy? No     1. Physical Functioning: Better  2. Family Relationship: Better  3. Social Relationship: Better  4. Mood: Better  5. Sleep Patterns: Better  6. Overall Function: Better      Visit Vitals  Smoking Status Never     PHYSICAL EXAM   General appearance: Alert and in no acute distress. speech is clear and coherent  No head trauma  Neck: no carotid bruits or thyromegaly. no lymphadenopathy   Respiratory : No respiratory distress, normal respiratory rhythm and effort. Clear bilateral breath sounds. No wheezing or rhonchi.   Cardiovascular: heart rate regular, S1, S2. no murmurs. no Lower extremity edema  Skin inspection: Normal skin color and pigmentation, normal skin turgor and no visible rash,  induration, or cellulitis  MSK: 5/5 strength upper and lower extremities without gait abnormalities. no loss of muscle mass   Neuro: 2-12 CN grossly intact.  no slurred speech. no lateralizing deficit  Psychiatric Orientation: Oriented to person, place, and time. no depression, homicidal or suicidal thoughts, normal affect     REVIEW OF SYSTEMS     Constitutional: not feeling tired and no fever, chills or sweats. Denies weight loss  no headache  Cardiovascular: no exertional chest pain, no palpitations, no lower extremity edema and no intermittent leg claudication.   Lungs: Denies shortness of breath, exertional dyspnea, wheezing  Gastrointestinal: no change in bowel habits, no diarrhea, no nausea, no vomiting and no abdominal pain. Denies Melena, brbpr or dark stool  Musculoskeletal: no myalgias, no muscle weakness and no limb swelling.   Skin: no rashes, no change in skin color and pigmentation and no skin lumps.   Neurological: no headaches, no seizures, no numbness, no lateralizing deficits and no fainting.   Psychiatric: no depression and no anxiety.   Urine: denies polyuria, hematuria, dysuria      Allergies   Allergen Reactions    Finasteride Unknown    Morphine Unknown    Penicillins Unknown    Valacyclovir Rash       Current Outpatient Medications   Medication Sig Dispense Refill    Accu-Chek Pippa Plus test strp strip USE TO TEST ONCE DAILY.      Accu-Chek Fastclix Lancet Drum misc 1 Lancet early in the morning..      amLODIPine (Norvasc) 10 mg tablet Take 1 tablet (10 mg) by mouth once daily. 90 tablet 3    aspirin 81 mg EC tablet Take 1 tablet (81 mg) by mouth once daily.      biotin 1 mg capsule Take by mouth.      calcium carbonate-vit D3-min 600 mg calcium- 400 unit tablet Take by mouth.      calcium carbonate-vitamin D3 (Caltrate with Vitamin D3) 600 mg-20 mcg (800 unit) tablet Take 1 tablet by mouth once daily. With breakfast      clindamycin (Cleocin) 300 mg capsule Take 1 capsule (300 mg) by  mouth. 1 HOUR BEFORE PROCEDURE AND 6 HOURS AFTER PROCEDURE AS DIRECTED      coenzyme Q-10 300 mg capsule capsule Take by mouth.      colchicine 0.6 mg tablet Take 1 tablet (0.6 mg) by mouth twice a day.      cyanocobalamin (Vitamin B-12) 100 mcg tablet Take by mouth.      diphenoxylate-atropine (Lomotil) 2.5-0.025 mg tablet Take 1 tablet by mouth once daily. With additional tablet in evening if needed 30 tablet 2    eye vitamin supplement (Ocuvite Eye Health Formula) capsule Take 1 tablet by mouth once daily.      famotidine (Pepcid) 40 mg tablet Take 1 tablet (40 mg) by mouth 2 times a day. 180 tablet 3    finasteride (Proscar) 5 mg tablet Take 1 tablet (5 mg) by mouth once daily. 90 tablet 3    glimepiride (Amaryl) 1 mg tablet Take by mouth.      hydroCHLOROthiazide (HYDRODiuril) 25 mg tablet Take 1 tablet (25 mg) by mouth once daily. 90 tablet 3    lactobacillus acidophilus capsule Take 1 capsule by mouth once daily. With breakfast      Livalo 2 mg tablet       losartan (Cozaar) 50 mg tablet Take 1 tablet (50 mg) by mouth once daily.      metoprolol tartrate (Lopressor) 25 mg tablet Take 1 tablet (25 mg) by mouth 2 times a day.      multivit-min/folic/vit K/lycop (ONE-A-DAY MEN'S MULTIVITAMIN ORAL) Take by mouth. VitaCraves Oral Tablet Chewable      multivitamin capsule Take by mouth.      nitroglycerin (Nitrostat) 0.4 mg SL tablet Place 1 tablet (0.4 mg) under the tongue every 5 minutes if needed for chest pain. Under the tongue as needed for chest pain 25 tablet 2    potassium chloride CR (K-Tab) 20 mEq ER tablet Take by mouth.      pravastatin (Pravachol) 20 mg tablet Take 1 tablet (20 mg) by mouth once daily.      predniSONE (Deltasone) 10 mg tablet Take 1 tablet (10 mg) by mouth once daily.      psyllium (Metamucil) powder Take by mouth.      ubidecarenone/vitamin E (COENZYME Q10-VITAMIN E ORAL) Take by mouth.      vits A,C,E/lutein/minerals (OCUVITE WITH LUTEIN ORAL) Take 1 tablet by mouth once daily with a  meal.       No current facility-administered medications for this visit.       Objective     Lab on 12/13/2023   Component Date Value Ref Range Status    Prostate Specific Antigen,Screen 12/13/2023 0.65  <=4.00 ng/mL Final    Thyroid Stimulating Hormone 12/13/2023 2.42  0.44 - 3.98 mIU/L Final    WBC 12/13/2023 6.9  4.4 - 11.3 x10*3/uL Final    nRBC 12/13/2023 0.0  0.0 - 0.0 /100 WBCs Final    RBC 12/13/2023 4.24 (L)  4.50 - 5.90 x10*6/uL Final    Hemoglobin 12/13/2023 13.7  13.5 - 17.5 g/dL Final    Hematocrit 12/13/2023 42.9  41.0 - 52.0 % Final    MCV 12/13/2023 101 (H)  80 - 100 fL Final    MCH 12/13/2023 32.3  26.0 - 34.0 pg Final    MCHC 12/13/2023 31.9 (L)  32.0 - 36.0 g/dL Final    RDW 12/13/2023 15.1 (H)  11.5 - 14.5 % Final    Platelets 12/13/2023 207  150 - 450 x10*3/uL Final    Total Protein 12/13/2023 7.5  6.4 - 8.2 g/dL Final    Albumin 12/13/2023 4.8  3.4 - 5.0 g/dL Final    Alpha 1 Globulin 12/13/2023 0.3  0.2 - 0.6 g/dL Final    Alpha 2 Globulin 12/13/2023 0.9  0.4 - 1.1 g/dL Final    Beta Globulin 12/13/2023 0.8  0.5 - 1.2 g/dL Final    Gamma 12/13/2023 0.8  0.5 - 1.4 g/dL Final    Protein Electrophoresis Comment 12/13/2023 Normal.   Final    Immunofixation Comment 12/13/2023 No monoclonal protein detected by immunofixation.   Final    Path Review - Serum Protein Electr* 12/13/2023 Reviewed and approved by TATO MCLEOD on 12/18/23 at 9:19 AM.       Final    Path Review - Serum Immunofixation 12/13/2023 Reviewed and approved by TATO MCLEOD on 12/18/23 at 9:19 AM.       Final   Lab on 11/15/2023   Component Date Value Ref Range Status    WBC 11/15/2023 7.3  4.4 - 11.3 x10*3/uL Final    nRBC 11/15/2023 0.0  0.0 - 0.0 /100 WBCs Final    RBC 11/15/2023 4.27 (L)  4.50 - 5.90 x10*6/uL Final    Hemoglobin 11/15/2023 13.8  13.5 - 17.5 g/dL Final    Hematocrit 11/15/2023 43.5  41.0 - 52.0 % Final    MCV 11/15/2023 102 (H)  80 - 100 fL Final    MCH 11/15/2023 32.3  26.0 - 34.0 pg Final     MCHC 11/15/2023 31.7 (L)  32.0 - 36.0 g/dL Final    RDW 11/15/2023 14.1  11.5 - 14.5 % Final    Platelets 11/15/2023 308  150 - 450 x10*3/uL Final    Ferritin 11/15/2023 516 (H)  20 - 300 ng/mL Final    Folate, Serum 11/15/2023 >24.0  >5.0 ng/mL Final    Iron 11/15/2023 97  35 - 150 ug/dL Final    UIBC 11/15/2023 351  110 - 370 ug/dL Final    TIBC 11/15/2023 448 (H)  240 - 445 ug/dL Final    % Saturation 11/15/2023 22 (L)  25 - 45 % Final    Vitamin B12 11/15/2023 1,529 (H)  211 - 911 pg/mL Final    Glucose 11/15/2023 100 (H)  74 - 99 mg/dL Final    Sodium 11/15/2023 140  136 - 145 mmol/L Final    Potassium 11/15/2023 4.3  3.5 - 5.3 mmol/L Final    Chloride 11/15/2023 101  98 - 107 mmol/L Final    Bicarbonate 11/15/2023 31  21 - 32 mmol/L Final    Anion Gap 11/15/2023 12  10 - 20 mmol/L Final    Urea Nitrogen 11/15/2023 21  6 - 23 mg/dL Final    Creatinine 11/15/2023 1.11  0.50 - 1.30 mg/dL Final    eGFR 11/15/2023 67  >60 mL/min/1.73m*2 Final    Calcium 11/15/2023 9.7  8.6 - 10.6 mg/dL Final    Total Protein 11/15/2023 7.1  6.4 - 8.2 g/dL Final    Albumin 11/15/2023 4.1  3.4 - 5.0 g/dL Final    Alpha 1 Globulin 11/15/2023 0.3  0.2 - 0.6 g/dL Final    Alpha 2 Globulin 11/15/2023 1.0  0.4 - 1.1 g/dL Final    Beta Globulin 11/15/2023 0.8  0.5 - 1.2 g/dL Final    Gamma 11/15/2023 0.9  0.5 - 1.4 g/dL Final    Protein Electrophoresis Comment 11/15/2023 Aberrant band detected. See immunofixation.   Final    Path Review - Serum Protein Electr* 11/15/2023 Reviewed and approved by MARISSA LINDER on 11/19/23 at 9:24 AM.      Final    Total Protein, Urine Random 11/15/2023 9  5 - 25 mg/dL Final    Albumin % 11/15/2023 55.8  % Final    Alpha 1 Globulin % 11/15/2023 12.7  % Final    Alpha 2 Globulin % 11/15/2023 8.3  % Final    Beta Globulin % 11/15/2023 16.4  % Final    Gamma Globulin % 11/15/2023 6.8  % Final    Urine Electrophoresis Comment 11/15/2023 Normal.      Final    Path Review-Urine Protein Electrop* 11/15/2023  Reviewed and approved by MARISSA LINDER on 11/20/23 at 7:33 PM.      Final    Immunofixation Comment 11/15/2023 Vague free lambda light chains in the gamma region; not definitively monoclonal.  Repeat testing is recommended after the resolution of any acute illness to monitor the evolution of this band.         Final    Path Review - Serum Immunofixation 11/15/2023 Reviewed and approved by MARISSA LINDER on 11/19/23 at 9:25 AM.      Final    Path Review - Urine Immunofixation 11/15/2023 Reviewed and approved by MARISSA LINDER on 11/20/23 at 7:33 PM.       Final    Immunofixation Comment 11/15/2023 No monoclonal protein detected by immunofixation.   Final       Radiology: Reviewed imaging in powerchart.  No results found.    Family History   Problem Relation Name Age of Onset    Stroke Mother      Heart failure Mother      Other (hepatic cirrhosis) Father      Parkinsonism Brother       Social History     Socioeconomic History    Marital status:      Spouse name: Not on file    Number of children: Not on file    Years of education: Not on file    Highest education level: Not on file   Occupational History    Not on file   Tobacco Use    Smoking status: Never    Smokeless tobacco: Never   Substance and Sexual Activity    Alcohol use: Never    Drug use: Never    Sexual activity: Not on file   Other Topics Concern    Not on file   Social History Narrative    Not on file     Social Determinants of Health     Financial Resource Strain: Not on file   Food Insecurity: Not on file   Transportation Needs: Not on file   Physical Activity: Not on file   Stress: Not on file   Social Connections: Not on file   Intimate Partner Violence: Not on file   Housing Stability: Not on file     Past Medical History:   Diagnosis Date    Abnormal result of other cardiovascular function study     Abnormal nuclear stress test    Acute ischemic heart disease, unspecified (CMS/HCC)     ACS (acute coronary syndrome)    Atherosclerotic  heart disease of native coronary artery without angina pectoris     Arteriosclerotic coronary artery disease    Chronic sinusitis, unspecified     Chronic congestion of paranasal sinus    Coronary angioplasty status     History of percutaneous coronary intervention    Encounter for preprocedural cardiovascular examination     Pre-operative cardiovascular examination    Encounter for screening for malignant neoplasm of prostate     Screening PSA (prostate specific antigen)    Lyme disease, unspecified 12/02/2013    Lyme disease    Other cervical disc displacement, unspecified cervical region     Herniated cervical disc without myelopathy    Other specified postprocedural states     History of carpal tunnel surgery    Other systemic sclerosis (CMS/HCC)     Cutaneous scleroderma    Personal history of malignant neoplasm, unspecified     History of malignant neoplasm    Personal history of other diseases of the circulatory system     History of hypertension    Personal history of other diseases of the circulatory system     History of abnormal electrocardiography    Personal history of other diseases of the digestive system     History of gastroesophageal reflux (GERD)    Personal history of other diseases of the musculoskeletal system and connective tissue     History of low back pain    Personal history of other diseases of the musculoskeletal system and connective tissue     History of spinal stenosis    Personal history of other diseases of the musculoskeletal system and connective tissue     History of arthritis    Personal history of other endocrine, nutritional and metabolic disease     History of hypothyroidism    Personal history of other endocrine, nutritional and metabolic disease     History of hyperlipidemia    Personal history of other endocrine, nutritional and metabolic disease     History of type 2 diabetes mellitus    Personal history of other endocrine, nutritional and metabolic disease     History of  hypoglycemia    Personal history of other specified conditions     History of chest pain    Polyp of stomach and duodenum     Gastric polyp     Past Surgical History:   Procedure Laterality Date    CORONARY ANGIOPLASTY WITH STENT PLACEMENT  07/27/2018    Cath Placement Of Stent 1    HAND SURGERY  07/27/2018    Hand Surgery                                                                                                                                                          OTHER SURGICAL HISTORY  07/27/2018    Surgery    OTHER SURGICAL HISTORY  07/27/2018    Total Hip Replacement Left       Charting was completed using voice recognition technology and may include unintended errors.     Subjective   Patient ID: Curtis Brown is a 83 y.o. male who presents for the following    Assessment/Plan       HPI      Visit Vitals  Smoking Status Never     PHYSICAL EXAM       REVIEW OF SYSTEMS       Allergies   Allergen Reactions    Finasteride Unknown    Morphine Unknown    Penicillins Unknown    Valacyclovir Rash       Current Outpatient Medications   Medication Sig Dispense Refill    Accu-Chek Pippa Plus test strp strip USE TO TEST ONCE DAILY.      Accu-Chek Fastclix Lancet Drum misc 1 Lancet early in the morning..      amLODIPine (Norvasc) 10 mg tablet Take 1 tablet (10 mg) by mouth once daily. 90 tablet 3    aspirin 81 mg EC tablet Take 1 tablet (81 mg) by mouth once daily.      biotin 1 mg capsule Take by mouth.      calcium carbonate-vit D3-min 600 mg calcium- 400 unit tablet Take by mouth.      calcium carbonate-vitamin D3 (Caltrate with Vitamin D3) 600 mg-20 mcg (800 unit) tablet Take 1 tablet by mouth once daily. With breakfast      clindamycin (Cleocin) 300 mg capsule Take 1 capsule (300 mg) by mouth. 1 HOUR BEFORE PROCEDURE AND 6 HOURS AFTER PROCEDURE AS DIRECTED      coenzyme Q-10 300 mg capsule capsule Take by mouth.      colchicine 0.6 mg tablet Take 1 tablet (0.6 mg) by mouth twice a day.      cyanocobalamin  (Vitamin B-12) 100 mcg tablet Take by mouth.      diphenoxylate-atropine (Lomotil) 2.5-0.025 mg tablet Take 1 tablet by mouth once daily. With additional tablet in evening if needed 30 tablet 2    eye vitamin supplement (Ocuvite Eye Health Formula) capsule Take 1 tablet by mouth once daily.      famotidine (Pepcid) 40 mg tablet Take 1 tablet (40 mg) by mouth 2 times a day. 180 tablet 3    finasteride (Proscar) 5 mg tablet Take 1 tablet (5 mg) by mouth once daily. 90 tablet 3    glimepiride (Amaryl) 1 mg tablet Take by mouth.      hydroCHLOROthiazide (HYDRODiuril) 25 mg tablet Take 1 tablet (25 mg) by mouth once daily. 90 tablet 3    lactobacillus acidophilus capsule Take 1 capsule by mouth once daily. With breakfast      Livalo 2 mg tablet       losartan (Cozaar) 50 mg tablet Take 1 tablet (50 mg) by mouth once daily.      metoprolol tartrate (Lopressor) 25 mg tablet Take 1 tablet (25 mg) by mouth 2 times a day.      multivit-min/folic/vit K/lycop (ONE-A-DAY MEN'S MULTIVITAMIN ORAL) Take by mouth. VitaCraves Oral Tablet Chewable      multivitamin capsule Take by mouth.      nitroglycerin (Nitrostat) 0.4 mg SL tablet Place 1 tablet (0.4 mg) under the tongue every 5 minutes if needed for chest pain. Under the tongue as needed for chest pain 25 tablet 2    potassium chloride CR (K-Tab) 20 mEq ER tablet Take by mouth.      pravastatin (Pravachol) 20 mg tablet Take 1 tablet (20 mg) by mouth once daily.      predniSONE (Deltasone) 10 mg tablet Take 1 tablet (10 mg) by mouth once daily.      psyllium (Metamucil) powder Take by mouth.      ubidecarenone/vitamin E (COENZYME Q10-VITAMIN E ORAL) Take by mouth.      vits A,C,E/lutein/minerals (OCUVITE WITH LUTEIN ORAL) Take 1 tablet by mouth once daily with a meal.       No current facility-administered medications for this visit.       Objective     Lab on 12/13/2023   Component Date Value Ref Range Status    Prostate Specific Antigen,Screen 12/13/2023 0.65  <=4.00 ng/mL Final     Thyroid Stimulating Hormone 12/13/2023 2.42  0.44 - 3.98 mIU/L Final    WBC 12/13/2023 6.9  4.4 - 11.3 x10*3/uL Final    nRBC 12/13/2023 0.0  0.0 - 0.0 /100 WBCs Final    RBC 12/13/2023 4.24 (L)  4.50 - 5.90 x10*6/uL Final    Hemoglobin 12/13/2023 13.7  13.5 - 17.5 g/dL Final    Hematocrit 12/13/2023 42.9  41.0 - 52.0 % Final    MCV 12/13/2023 101 (H)  80 - 100 fL Final    MCH 12/13/2023 32.3  26.0 - 34.0 pg Final    MCHC 12/13/2023 31.9 (L)  32.0 - 36.0 g/dL Final    RDW 12/13/2023 15.1 (H)  11.5 - 14.5 % Final    Platelets 12/13/2023 207  150 - 450 x10*3/uL Final    Total Protein 12/13/2023 7.5  6.4 - 8.2 g/dL Final    Albumin 12/13/2023 4.8  3.4 - 5.0 g/dL Final    Alpha 1 Globulin 12/13/2023 0.3  0.2 - 0.6 g/dL Final    Alpha 2 Globulin 12/13/2023 0.9  0.4 - 1.1 g/dL Final    Beta Globulin 12/13/2023 0.8  0.5 - 1.2 g/dL Final    Gamma 12/13/2023 0.8  0.5 - 1.4 g/dL Final    Protein Electrophoresis Comment 12/13/2023 Normal.   Final    Immunofixation Comment 12/13/2023 No monoclonal protein detected by immunofixation.   Final    Path Review - Serum Protein Electr* 12/13/2023 Reviewed and approved by TATO MCLEOD on 12/18/23 at 9:19 AM.       Final    Path Review - Serum Immunofixation 12/13/2023 Reviewed and approved by TATO MCLEOD on 12/18/23 at 9:19 AM.       Final   Lab on 11/15/2023   Component Date Value Ref Range Status    WBC 11/15/2023 7.3  4.4 - 11.3 x10*3/uL Final    nRBC 11/15/2023 0.0  0.0 - 0.0 /100 WBCs Final    RBC 11/15/2023 4.27 (L)  4.50 - 5.90 x10*6/uL Final    Hemoglobin 11/15/2023 13.8  13.5 - 17.5 g/dL Final    Hematocrit 11/15/2023 43.5  41.0 - 52.0 % Final    MCV 11/15/2023 102 (H)  80 - 100 fL Final    MCH 11/15/2023 32.3  26.0 - 34.0 pg Final    MCHC 11/15/2023 31.7 (L)  32.0 - 36.0 g/dL Final    RDW 11/15/2023 14.1  11.5 - 14.5 % Final    Platelets 11/15/2023 308  150 - 450 x10*3/uL Final    Ferritin 11/15/2023 516 (H)  20 - 300 ng/mL Final    Folate, Serum  11/15/2023 >24.0  >5.0 ng/mL Final    Iron 11/15/2023 97  35 - 150 ug/dL Final    UIBC 11/15/2023 351  110 - 370 ug/dL Final    TIBC 11/15/2023 448 (H)  240 - 445 ug/dL Final    % Saturation 11/15/2023 22 (L)  25 - 45 % Final    Vitamin B12 11/15/2023 1,529 (H)  211 - 911 pg/mL Final    Glucose 11/15/2023 100 (H)  74 - 99 mg/dL Final    Sodium 11/15/2023 140  136 - 145 mmol/L Final    Potassium 11/15/2023 4.3  3.5 - 5.3 mmol/L Final    Chloride 11/15/2023 101  98 - 107 mmol/L Final    Bicarbonate 11/15/2023 31  21 - 32 mmol/L Final    Anion Gap 11/15/2023 12  10 - 20 mmol/L Final    Urea Nitrogen 11/15/2023 21  6 - 23 mg/dL Final    Creatinine 11/15/2023 1.11  0.50 - 1.30 mg/dL Final    eGFR 11/15/2023 67  >60 mL/min/1.73m*2 Final    Calcium 11/15/2023 9.7  8.6 - 10.6 mg/dL Final    Total Protein 11/15/2023 7.1  6.4 - 8.2 g/dL Final    Albumin 11/15/2023 4.1  3.4 - 5.0 g/dL Final    Alpha 1 Globulin 11/15/2023 0.3  0.2 - 0.6 g/dL Final    Alpha 2 Globulin 11/15/2023 1.0  0.4 - 1.1 g/dL Final    Beta Globulin 11/15/2023 0.8  0.5 - 1.2 g/dL Final    Gamma 11/15/2023 0.9  0.5 - 1.4 g/dL Final    Protein Electrophoresis Comment 11/15/2023 Aberrant band detected. See immunofixation.   Final    Path Review - Serum Protein Electr* 11/15/2023 Reviewed and approved by MARISSA LIDNER on 11/19/23 at 9:24 AM.      Final    Total Protein, Urine Random 11/15/2023 9  5 - 25 mg/dL Final    Albumin % 11/15/2023 55.8  % Final    Alpha 1 Globulin % 11/15/2023 12.7  % Final    Alpha 2 Globulin % 11/15/2023 8.3  % Final    Beta Globulin % 11/15/2023 16.4  % Final    Gamma Globulin % 11/15/2023 6.8  % Final    Urine Electrophoresis Comment 11/15/2023 Normal.      Final    Path Review-Urine Protein Electrop* 11/15/2023 Reviewed and approved by MARISSA LINDER on 11/20/23 at 7:33 PM.      Final    Immunofixation Comment 11/15/2023 Vague free lambda light chains in the gamma region; not definitively monoclonal.  Repeat testing is  recommended after the resolution of any acute illness to monitor the evolution of this band.         Final    Path Review - Serum Immunofixation 11/15/2023 Reviewed and approved by MARISSA LINDER on 11/19/23 at 9:25 AM.      Final    Path Review - Urine Immunofixation 11/15/2023 Reviewed and approved by MARISSA LINDER on 11/20/23 at 7:33 PM.       Final    Immunofixation Comment 11/15/2023 No monoclonal protein detected by immunofixation.   Final       Radiology: Reviewed imaging in powerchart.  No results found.    Family History   Problem Relation Name Age of Onset    Stroke Mother      Heart failure Mother      Other (hepatic cirrhosis) Father      Parkinsonism Brother       Social History     Socioeconomic History    Marital status:      Spouse name: Not on file    Number of children: Not on file    Years of education: Not on file    Highest education level: Not on file   Occupational History    Not on file   Tobacco Use    Smoking status: Never    Smokeless tobacco: Never   Substance and Sexual Activity    Alcohol use: Never    Drug use: Never    Sexual activity: Not on file   Other Topics Concern    Not on file   Social History Narrative    Not on file     Social Determinants of Health     Financial Resource Strain: Not on file   Food Insecurity: Not on file   Transportation Needs: Not on file   Physical Activity: Not on file   Stress: Not on file   Social Connections: Not on file   Intimate Partner Violence: Not on file   Housing Stability: Not on file     Past Medical History:   Diagnosis Date    Abnormal result of other cardiovascular function study     Abnormal nuclear stress test    Acute ischemic heart disease, unspecified (CMS/HCC)     ACS (acute coronary syndrome)    Atherosclerotic heart disease of native coronary artery without angina pectoris     Arteriosclerotic coronary artery disease    Chronic sinusitis, unspecified     Chronic congestion of paranasal sinus    Coronary angioplasty  status     History of percutaneous coronary intervention    Encounter for preprocedural cardiovascular examination     Pre-operative cardiovascular examination    Encounter for screening for malignant neoplasm of prostate     Screening PSA (prostate specific antigen)    Lyme disease, unspecified 12/02/2013    Lyme disease    Other cervical disc displacement, unspecified cervical region     Herniated cervical disc without myelopathy    Other specified postprocedural states     History of carpal tunnel surgery    Other systemic sclerosis (CMS/HCC)     Cutaneous scleroderma    Personal history of malignant neoplasm, unspecified     History of malignant neoplasm    Personal history of other diseases of the circulatory system     History of hypertension    Personal history of other diseases of the circulatory system     History of abnormal electrocardiography    Personal history of other diseases of the digestive system     History of gastroesophageal reflux (GERD)    Personal history of other diseases of the musculoskeletal system and connective tissue     History of low back pain    Personal history of other diseases of the musculoskeletal system and connective tissue     History of spinal stenosis    Personal history of other diseases of the musculoskeletal system and connective tissue     History of arthritis    Personal history of other endocrine, nutritional and metabolic disease     History of hypothyroidism    Personal history of other endocrine, nutritional and metabolic disease     History of hyperlipidemia    Personal history of other endocrine, nutritional and metabolic disease     History of type 2 diabetes mellitus    Personal history of other endocrine, nutritional and metabolic disease     History of hypoglycemia    Personal history of other specified conditions     History of chest pain    Polyp of stomach and duodenum     Gastric polyp     Past Surgical History:   Procedure Laterality Date    CORONARY  ANGIOPLASTY WITH STENT PLACEMENT  07/27/2018    Cath Placement Of Stent 1    HAND SURGERY  07/27/2018    Hand Surgery                                                                                                                                                          OTHER SURGICAL HISTORY  07/27/2018    Surgery    OTHER SURGICAL HISTORY  07/27/2018    Total Hip Replacement Left       Charting was completed using voice recognition technology and may include unintended errors.

## 2024-03-07 LAB
NON-UH HIE 6-ACETYLMORPHINE (CUTOFF 20 NG/ML): NOT DETECTED
NON-UH HIE 7-AMINOCLONAZEPAM (CUTOFF 40 NG/ML): NOT DETECTED
NON-UH HIE ALPHA-OH-ALPRAZOLAM (CUTOFF 20 NG/ML): NOT DETECTED
NON-UH HIE ALPHA-OH-MIDAZOLAM (CUTOFF 20 NG/ML): NOT DETECTED
NON-UH HIE ALPRAZOLAM (CUTOFF 40 NG/ML): NOT DETECTED
NON-UH HIE AMPHETAMINE (CUTOFF 50 NG/ML): NOT DETECTED
NON-UH HIE BARBITURATES (CUTOFF 200 NG/ML): NEGATIVE
NON-UH HIE BENZOYLECGONINE (CUTOFF 150 NG/ML): NEGATIVE
NON-UH HIE BUPRENORPHINE (CUTOFF 5 NG/ML): NOT DETECTED
NON-UH HIE CARISOPRODOL (CUT-OFF 100 NG/ML): NEGATIVE
NON-UH HIE CLONAZEPAM (CUTOFF 20 NG/ML): NOT DETECTED
NON-UH HIE CODEINE (CUTOFF 40 NG/ML): NOT DETECTED
NON-UH HIE CREATININE, URINE: 144.4 MG/DL (ref 20–400)
NON-UH HIE DIAZEPAM (CUTOFF 50 NG/ML): NOT DETECTED
NON-UH HIE EER PAIN MGT PAN, MASS SPEC/EMIT INTERP: NORMAL
NON-UH HIE ETHYL GLUCURONIDE (CUTOFF 500 NG/ML): NEGATIVE
NON-UH HIE FENTANYL (CUTOFF 2 NG/ML): NOT DETECTED
NON-UH HIE GABAPENTIN (CUTOFF 100 NG/ML): NOT DETECTED
NON-UH HIE HYDROCODONE (CUTOFF 40 NG/ML): NOT DETECTED
NON-UH HIE HYDROMORPHONE (CUTOFF 20 NG/ML): NOT DETECTED
NON-UH HIE LORAZEPAM (CUTOFF 60 NG/ML): NOT DETECTED
NON-UH HIE MARIJUANA METABOLITE (CUTOFF 20 NG/ML): NEGATIVE
NON-UH HIE MDA (CUTOFF 200 NG/ML): NOT DETECTED
NON-UH HIE MDEA- EVE (CUTOFF 200 NG/ML): NOT DETECTED
NON-UH HIE MDMA- ECSTASY (CUTOFF 200 NG/ML): NOT DETECTED
NON-UH HIE MEPERIDINE METABOLITE (CUTOFF 50 NG/ML): NOT DETECTED
NON-UH HIE METHADONE (CUTOFF 150 NG/ML): NEGATIVE
NON-UH HIE METHAMPHETAMINE (CUTOFF 200 NG/ML): NOT DETECTED
NON-UH HIE METHYLPHENIDATE (CUTOFF 100 NG/ML): NOT DETECTED
NON-UH HIE MIDAZOLAM (CUTOFF 20 NG/ML): NOT DETECTED
NON-UH HIE MORPHINE (CUTOFF 20 NG/ML): NOT DETECTED
NON-UH HIE NALOXONE (CUTOFF 100 NG/ML): NOT DETECTED
NON-UH HIE NORBUPRENORPHINE (CUTOFF 20 NG/ML): NOT DETECTED
NON-UH HIE NORDIAZEPAM (CUTOFF 50 NG/ML): NOT DETECTED
NON-UH HIE NORFENTANYL (CUTOFF 2 NG/ML): NOT DETECTED
NON-UH HIE NORHYDROCODONE  (CUTOFF 100 NG/ML): NOT DETECTED
NON-UH HIE NOROXYCODONE  (CUTOFF 100 NG/ML): NOT DETECTED
NON-UH HIE NOROXYMORPHONE  (CUTOFF 100 NG/ML): NOT DETECTED
NON-UH HIE OXAZEPAM (CUTOFF 50 NG/ML): NOT DETECTED
NON-UH HIE OXYCODONE (CUTOFF 40 NG/ML): NOT DETECTED
NON-UH HIE OXYMORPHONE (CUTOFF 40 NG/ML): NOT DETECTED
NON-UH HIE PAIN MANAGEMENT DRUG PANEL INTERP: NORMAL
NON-UH HIE PAIN MANAGEMENT DRUG PANEL: NORMAL
NON-UH HIE PCP (CUTOFF 25 NG/ML): NEGATIVE
NON-UH HIE PHENTERMINE (CUTOFF 100 NG/ML): NOT DETECTED
NON-UH HIE PREGABALIN (CUTOFF 100 NG/ML): NOT DETECTED
NON-UH HIE TAPENTADOL (CUTOFF 100 NG/ML): NOT DETECTED
NON-UH HIE TAPENTADOL-O-SULF (CUTOFF 200 NG/ML): NOT DETECTED
NON-UH HIE TEMAZEPAM (CUTOFF 50 NG/ML): NOT DETECTED
NON-UH HIE TRAMADOL (CUTOFF 200 NG/ML): NEGATIVE
NON-UH HIE ZOLPIDEM (CUTOFF 20 NG/ML): NOT DETECTED
NON-UH HIE ZOLPIDEM METABOLITE (CUTOFF 100 NG/ML): NOT DETECTED

## 2024-03-13 ENCOUNTER — APPOINTMENT (OUTPATIENT)
Dept: PRIMARY CARE | Facility: CLINIC | Age: 84
End: 2024-03-13
Payer: MEDICARE

## 2024-03-15 ENCOUNTER — APPOINTMENT (OUTPATIENT)
Dept: PRIMARY CARE | Facility: CLINIC | Age: 84
End: 2024-03-15
Payer: MEDICARE

## 2024-04-03 PROBLEM — A69.20 LYME DISEASE: Status: ACTIVE | Noted: 2024-04-03

## 2024-04-03 PROBLEM — R19.7 DIARRHEA: Status: ACTIVE | Noted: 2024-04-03

## 2024-04-03 PROBLEM — R09.81 CONGESTION OF PARANASAL SINUS: Status: ACTIVE | Noted: 2024-04-03

## 2024-04-11 ENCOUNTER — OFFICE VISIT (OUTPATIENT)
Dept: CARDIOLOGY | Facility: CLINIC | Age: 84
End: 2024-04-11
Payer: MEDICARE

## 2024-04-11 ENCOUNTER — HOSPITAL ENCOUNTER (OUTPATIENT)
Dept: CARDIOLOGY | Facility: CLINIC | Age: 84
Discharge: HOME | End: 2024-04-11
Payer: MEDICARE

## 2024-04-11 ENCOUNTER — TELEPHONE (OUTPATIENT)
Dept: PRIMARY CARE | Facility: CLINIC | Age: 84
End: 2024-04-11

## 2024-04-11 VITALS
WEIGHT: 191 LBS | HEIGHT: 68 IN | SYSTOLIC BLOOD PRESSURE: 150 MMHG | DIASTOLIC BLOOD PRESSURE: 60 MMHG | OXYGEN SATURATION: 97 % | BODY MASS INDEX: 28.95 KG/M2 | HEART RATE: 77 BPM

## 2024-04-11 VITALS
DIASTOLIC BLOOD PRESSURE: 77 MMHG | BODY MASS INDEX: 29.1 KG/M2 | WEIGHT: 192 LBS | SYSTOLIC BLOOD PRESSURE: 152 MMHG | HEIGHT: 68 IN

## 2024-04-11 DIAGNOSIS — E03.9 HYPOTHYROIDISM, UNSPECIFIED TYPE: ICD-10-CM

## 2024-04-11 DIAGNOSIS — I31.9 PERICARDITIS, UNSPECIFIED CHRONICITY, UNSPECIFIED TYPE (HHS-HCC): ICD-10-CM

## 2024-04-11 DIAGNOSIS — I06.2 RHEUMATIC AORTIC STENOSIS WITH INSUFFICIENCY: ICD-10-CM

## 2024-04-11 DIAGNOSIS — Z95.5 PRESENCE OF STENT IN CORONARY ARTERY: ICD-10-CM

## 2024-04-11 DIAGNOSIS — I35.0 NONRHEUMATIC AORTIC VALVE STENOSIS: Primary | ICD-10-CM

## 2024-04-11 DIAGNOSIS — I35.0 NONRHEUMATIC AORTIC VALVE STENOSIS: ICD-10-CM

## 2024-04-11 DIAGNOSIS — I45.10 RBBB: ICD-10-CM

## 2024-04-11 DIAGNOSIS — I10 PRIMARY HYPERTENSION: ICD-10-CM

## 2024-04-11 DIAGNOSIS — N10 ACUTE PYELONEPHRITIS: ICD-10-CM

## 2024-04-11 LAB
AORTIC VALVE MEAN GRADIENT: 34.4 MMHG
AORTIC VALVE PEAK VELOCITY: 3.93 M/S
AV PEAK GRADIENT: 61.6 MMHG
AVA (PEAK VEL): 0.77 CM2
AVA (VTI): 0.89 CM2
EJECTION FRACTION APICAL 4 CHAMBER: 50.1
LEFT ATRIUM VOLUME AREA LENGTH INDEX BSA: 33.4 ML/M2
LEFT VENTRICLE INTERNAL DIMENSION DIASTOLE: 4.62 CM (ref 3.5–6)
LEFT VENTRICULAR OUTFLOW TRACT DIAMETER: 2.03 CM
LV EJECTION FRACTION BIPLANE: 47 %
MITRAL VALVE E/A RATIO: 0.54
RIGHT VENTRICLE FREE WALL PEAK S': 1.3 CM/S
RIGHT VENTRICLE PEAK SYSTOLIC PRESSURE: 34.7 MMHG
TRICUSPID ANNULAR PLANE SYSTOLIC EXCURSION: 1.7 CM

## 2024-04-11 PROCEDURE — 3077F SYST BP >= 140 MM HG: CPT | Performed by: INTERNAL MEDICINE

## 2024-04-11 PROCEDURE — 1159F MED LIST DOCD IN RCRD: CPT | Performed by: INTERNAL MEDICINE

## 2024-04-11 PROCEDURE — 1036F TOBACCO NON-USER: CPT | Performed by: INTERNAL MEDICINE

## 2024-04-11 PROCEDURE — 93306 TTE W/DOPPLER COMPLETE: CPT

## 2024-04-11 PROCEDURE — 99215 OFFICE O/P EST HI 40 MIN: CPT | Performed by: INTERNAL MEDICINE

## 2024-04-11 PROCEDURE — 93306 TTE W/DOPPLER COMPLETE: CPT | Performed by: INTERNAL MEDICINE

## 2024-04-11 PROCEDURE — 93000 ELECTROCARDIOGRAM COMPLETE: CPT | Performed by: INTERNAL MEDICINE

## 2024-04-11 PROCEDURE — 3078F DIAST BP <80 MM HG: CPT | Performed by: INTERNAL MEDICINE

## 2024-04-11 RX ORDER — GLUCOSAMINE/D3/BOSWELLIA SERRA 1500MG-400
TABLET ORAL
COMMUNITY
Start: 2019-12-02

## 2024-04-11 RX ORDER — LEVOTHYROXINE SODIUM 50 UG/1
TABLET ORAL
COMMUNITY
Start: 2024-03-10

## 2024-04-11 RX ORDER — GLIMEPIRIDE 2 MG/1
2 TABLET ORAL 2 TIMES DAILY
COMMUNITY
Start: 2024-04-01

## 2024-04-11 RX ORDER — PITAVASTATIN CALCIUM 4.18 MG/1
TABLET, FILM COATED ORAL
COMMUNITY
Start: 2024-04-08

## 2024-04-11 RX ORDER — OMEGA-3S/DHA/EPA/FISH OIL/D3 300MG-1000
CAPSULE ORAL
COMMUNITY
Start: 2019-12-02

## 2024-04-11 NOTE — ASSESSMENT & PLAN NOTE
4/11/24 echocardiogram severe aortic stenosis peak gradient increased to 61.6 from 58 and mean gradicent from 33.1 to 34.4, LVEF - 50-55% (reviewed today)

## 2024-04-11 NOTE — PROGRESS NOTES
"  Shahrzad Brown  is a 83 y.o. year old male who presents for aortic stenosis.  No chest pain, no dyspnea, no palpitation no edema.  He had flank pain with pyelonephritis last October.  No chest pain, no dyspnea, no palpitations, no edema, no lightheadedness no syncope    Blood pressure 150/60, pulse 77, height 1.727 m (5' 8\"), weight 86.6 kg (191 lb), SpO2 97%.   Finasteride, Morphine, Penicillins, and Valacyclovir  Past Medical History:   Diagnosis Date    Abnormal result of other cardiovascular function study     Abnormal nuclear stress test    Acute ischemic heart disease, unspecified (CMS/HCC)     ACS (acute coronary syndrome)    Atherosclerotic heart disease of native coronary artery without angina pectoris     Arteriosclerotic coronary artery disease    Chronic sinusitis, unspecified     Chronic congestion of paranasal sinus    Coronary angioplasty status     History of percutaneous coronary intervention    Encounter for preprocedural cardiovascular examination     Pre-operative cardiovascular examination    Encounter for screening for malignant neoplasm of prostate     Screening PSA (prostate specific antigen)    Lyme disease, unspecified 12/02/2013    Lyme disease    Other cervical disc displacement, unspecified cervical region     Herniated cervical disc without myelopathy    Other specified postprocedural states     History of carpal tunnel surgery    Other systemic sclerosis (CMS/HCC)     Cutaneous scleroderma    Personal history of malignant neoplasm, unspecified     History of malignant neoplasm    Personal history of other diseases of the circulatory system     History of hypertension    Personal history of other diseases of the circulatory system     History of abnormal electrocardiography    Personal history of other diseases of the digestive system     History of gastroesophageal reflux (GERD)    Personal history of other diseases of the musculoskeletal system and connective tissue  "    History of low back pain    Personal history of other diseases of the musculoskeletal system and connective tissue     History of spinal stenosis    Personal history of other diseases of the musculoskeletal system and connective tissue     History of arthritis    Personal history of other endocrine, nutritional and metabolic disease     History of hypothyroidism    Personal history of other endocrine, nutritional and metabolic disease     History of hyperlipidemia    Personal history of other endocrine, nutritional and metabolic disease     History of type 2 diabetes mellitus    Personal history of other endocrine, nutritional and metabolic disease     History of hypoglycemia    Personal history of other specified conditions     History of chest pain    Polyp of stomach and duodenum     Gastric polyp     Past Surgical History:   Procedure Laterality Date    CORONARY ANGIOPLASTY WITH STENT PLACEMENT  07/27/2018    Cath Placement Of Stent 1    HAND SURGERY  07/27/2018    Hand Surgery                                                                                                                                                          OTHER SURGICAL HISTORY  07/27/2018    Surgery    OTHER SURGICAL HISTORY  07/27/2018    Total Hip Replacement Left     Family History   Problem Relation Name Age of Onset    Stroke Mother      Heart failure Mother      Other (hepatic cirrhosis) Father      Parkinsonism Brother       @SOC    Current Outpatient Medications   Medication Sig Dispense Refill    glimepiride (Amaryl) 2 mg tablet Take 1 tablet (2 mg) by mouth 2 times a day.      glucosamine-D3-Boswellia serr (Osteo Bi-Flex, 5-Loxin,) 1,500-400-100 mg-unit-mg tablet Take by mouth.      levothyroxine (Synthroid, Levoxyl) 50 mcg tablet       Livalo 4 mg tablet       omega-3s-dha-epa-fish oil 720-1,200 mg capsule,delayed release(DR/EC) Take by mouth.      Accu-Chek Pippa Plus test strp strip USE TO TEST ONCE DAILY.      Accu-Chek  Fastclix Lancet Drum misc 1 Lancet early in the morning..      amLODIPine (Norvasc) 10 mg tablet Take 1 tablet (10 mg) by mouth once daily. 90 tablet 3    aspirin 81 mg EC tablet Take 1 tablet (81 mg) by mouth once daily.      biotin 1 mg capsule Take by mouth.      calcium carbonate-vit D3-min 600 mg calcium- 400 unit tablet Take by mouth.      clindamycin (Cleocin) 300 mg capsule Take 1 capsule (300 mg) by mouth. 1 HOUR BEFORE PROCEDURE AND 6 HOURS AFTER PROCEDURE AS DIRECTED      coenzyme Q-10 300 mg capsule capsule Take by mouth.      cyanocobalamin (Vitamin B-12) 100 mcg tablet Take by mouth.      diphenoxylate-atropine (Lomotil) 2.5-0.025 mg tablet Take 1 tablet by mouth once daily. With additional tablet in evening if needed 30 tablet 2    eye vitamin supplement (Ocuvite Eye Health Formula) capsule Take 1 tablet by mouth once daily.      famotidine (Pepcid) 40 mg tablet Take 1 tablet (40 mg) by mouth 2 times a day. 180 tablet 3    finasteride (Proscar) 5 mg tablet Take 1 tablet (5 mg) by mouth once daily. 90 tablet 3    glimepiride (Amaryl) 1 mg tablet Take by mouth.      hydroCHLOROthiazide (HYDRODiuril) 25 mg tablet Take 1 tablet (25 mg) by mouth once daily. 90 tablet 3    lactobacillus acidophilus capsule Take 1 capsule by mouth once daily. With breakfast      losartan (Cozaar) 50 mg tablet Take 1 tablet (50 mg) by mouth once daily.      metoprolol tartrate (Lopressor) 25 mg tablet Take 1 tablet (25 mg) by mouth 2 times a day.      multivitamin capsule Take by mouth.      nitroglycerin (Nitrostat) 0.4 mg SL tablet Place 1 tablet (0.4 mg) under the tongue every 5 minutes if needed for chest pain. Under the tongue as needed for chest pain 25 tablet 2    potassium chloride CR (K-Tab) 20 mEq ER tablet Take by mouth.      primidone (Mysoline) 50 mg tablet Take 1 tablet (50 mg) by mouth once daily. 90 tablet 3    psyllium (Metamucil) powder Take by mouth.      vit A/vit C/vit E/zinc/copper (PRESERVISION AREDS  ORAL) Take by mouth.      vit C/zinc citrate/elderberry (SAMBUCUS ELDERBERRY ORAL) Take by mouth.       No current facility-administered medications for this visit.        ROS  Review of Systems   All other systems reviewed and are negative.      Physical Exam  Physical Exam  Constitutional:       Appearance: Normal appearance.   HENT:      Head: Normocephalic and atraumatic.   Cardiovascular:      Rate and Rhythm: Normal rate and regular rhythm.      Heart sounds: Murmur heard.      Crescendo decrescendo systolic murmur is present with a grade of 4/6.   Pulmonary:      Effort: Pulmonary effort is normal.      Breath sounds: Normal breath sounds.   Abdominal:      General: Abdomen is flat.   Musculoskeletal:      Right lower leg: No edema.      Left lower leg: No edema.   Skin:     General: Skin is warm and dry.   Neurological:      General: No focal deficit present.      Mental Status: He is alert and oriented to person, place, and time.   Psychiatric:         Mood and Affect: Mood normal.         Behavior: Behavior normal.          EKG  Encounter Date: 04/11/24   ECG 12 Lead    Narrative    NSR at 77/min., RBBB       Problem List Items Addressed This Visit       AS (aortic stenosis) - Primary     4/11/24 echocardiogram severe aortic stenosis peak gradient increased to 61.6 from 58 and mean gradicent from 33.1 to 34.4, LVEF - 50-55% (reviewed today)         Relevant Orders    Transthoracic Echo (TTE) Complete    Follow Up In Cardiology    Hypothyroidism    Pericarditis     Admitted [facility] 9/4-7/22 resolved with treatment         Presence of stent in coronary artery     Remote PCI of RCA 10/18/17 catheterization showing patent stent         Hypertension    Relevant Orders    ECG 12 Lead (Completed)    Acute pyelonephritis         Return 6 months with EKG and echocardiogram    Anil Lindsey MD

## 2024-04-15 ENCOUNTER — OFFICE VISIT (OUTPATIENT)
Dept: PRIMARY CARE | Facility: CLINIC | Age: 84
End: 2024-04-15
Payer: MEDICARE

## 2024-04-15 ENCOUNTER — LAB (OUTPATIENT)
Dept: LAB | Facility: LAB | Age: 84
End: 2024-04-15
Payer: MEDICARE

## 2024-04-15 VITALS
HEART RATE: 74 BPM | WEIGHT: 193 LBS | BODY MASS INDEX: 29.25 KG/M2 | DIASTOLIC BLOOD PRESSURE: 68 MMHG | SYSTOLIC BLOOD PRESSURE: 140 MMHG | OXYGEN SATURATION: 98 % | HEIGHT: 68 IN

## 2024-04-15 DIAGNOSIS — Z79.899 MEDICATION MANAGEMENT: ICD-10-CM

## 2024-04-15 DIAGNOSIS — R10.9 LEFT FLANK PAIN: Primary | ICD-10-CM

## 2024-04-15 DIAGNOSIS — M48.00 SPINAL STENOSIS, UNSPECIFIED SPINAL REGION: ICD-10-CM

## 2024-04-15 LAB
AMPHETAMINES UR QL SCN: ABNORMAL
BARBITURATES UR QL SCN: ABNORMAL
BENZODIAZ UR QL SCN: ABNORMAL
BZE UR QL SCN: ABNORMAL
CANNABINOIDS UR QL SCN: ABNORMAL
FENTANYL+NORFENTANYL UR QL SCN: ABNORMAL
METHADONE UR QL SCN: ABNORMAL
OPIATES UR QL SCN: ABNORMAL
OXYCODONE+OXYMORPHONE UR QL SCN: ABNORMAL
PCP UR QL SCN: ABNORMAL

## 2024-04-15 PROCEDURE — 3077F SYST BP >= 140 MM HG: CPT | Performed by: INTERNAL MEDICINE

## 2024-04-15 PROCEDURE — 80365 DRUG SCREENING OXYCODONE: CPT

## 2024-04-15 PROCEDURE — 80361 OPIATES 1 OR MORE: CPT

## 2024-04-15 PROCEDURE — 80307 DRUG TEST PRSMV CHEM ANLYZR: CPT

## 2024-04-15 PROCEDURE — 3078F DIAST BP <80 MM HG: CPT | Performed by: INTERNAL MEDICINE

## 2024-04-15 PROCEDURE — 1036F TOBACCO NON-USER: CPT | Performed by: INTERNAL MEDICINE

## 2024-04-15 PROCEDURE — 99496 TRANSJ CARE MGMT HIGH F2F 7D: CPT | Performed by: INTERNAL MEDICINE

## 2024-04-15 PROCEDURE — 1159F MED LIST DOCD IN RCRD: CPT | Performed by: INTERNAL MEDICINE

## 2024-04-15 RX ORDER — HYDROCODONE BITARTRATE AND ACETAMINOPHEN 5; 325 MG/1; MG/1
1 TABLET ORAL EVERY 6 HOURS PRN
Qty: 60 TABLET | Refills: 0 | Status: SHIPPED | OUTPATIENT
Start: 2024-04-15 | End: 2024-05-15

## 2024-04-15 NOTE — PROGRESS NOTES
Subjective   Patient ID: Curtis Brown is a 83 y.o. 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   As he continues to have left flank to left groin pain, patient is following up with urology for a biopsy of this mass coming up spring 2024.     Hx spinal stenosis following with dr thorep and dr jones in the past, I do recommend repeat follow up with them along with orhto .   Given the circumstance.     Urine drug screen 4/15/2024 ordered  Hydrocodone 5-325mg bid #60   Controlled substance agreement signed 4/15/2024    Other medical issues, see below  lymphocytic colitis diarrhea: stable on diphenoxylate  #30    Sent to [facility] urine pain panel     Hx SEPSIS ACUTE PYELONEPHROSIS 10/2023: resolved at this time    Slight anemia hgb trending 11-12s lately but 1 year ago his hgb was 15. Iron/b12/folte wnl.     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  improved, continue on   hctz to 25mg daily ,   amlodipine 10mg daily.   Losartan 50mg daily      hld: stable, continue statin. LDL < 70. Patient wants to change Lipitor to Crestor per patient's request. (Patient went to Dr Rodrigues and will get pitavastatin)     Dry macular degeneration: stable following with Dr nAdrew @ [facility]     dm2: stable, 6s    Patient follows with dr rodrigues   5 mg tablet Amaryl daily . Watch for low sugars.      PVD: stable, continue statin and aspirin      diastolic CHF and severe aortic stenosis: stable, patient is very active. stable. getting echo with dr montes twice a year      BPH: patient is taking  finasteride. He denies any complaints and he needs a refill       patient does not have children. needs to consider POA outs       Colonoscopy may 2021 with dr jenkins     patient was recently seen in the hospital and patient's hospital record has been reviewed with the patient including, but not limited to, discharge summary, labs, imaging, consultation notes (if pertinent). Ambulatory medication list and discharge hospitalization list has been compared and updated for changes.        hpi  male cad s/p stent 11/2013, PVD, htn, hld comes for the following    Patient was hospitalized at [facility] from April 5, 2024 to April 7, 2024 as he had left-sided flank pain that radiated down to his left groin into his testicles.  He says this woke him up out of his early sleep he decided come emergency department for further assessment.  CT scan of the abdomen pelvis shows a left renal mass which is a known mass.  However there is some mild perinephric stranding as well.  Urinalysis was negative for infection labs were unremarkable.  His pain was much better controlled he was seen by urology who did not see any evidence of urological cause.  Heme-onc saw the patient and agreed about the left renal mass being a longstanding issue without any further intervention.  On going pain with his left flank and groin at home. He is woken up in the middle of the night and he is going to get a biopsy of the left kidney mass. He would like to have some pain killers. He says he had an old hydrocodone at home and it was quite helpful to get back to sleep but he does not have any medications.     Other medical issues, see below     HX PERICARDITIS AND PERICARDIAL EFFUSION sEPTEMBER 2022: this has since resolved. and he follows with cardiology regularly for this     Resting tremors: worsening in his hands and he is UNABLE TO  hold objects in his hands.     lymphocytic colitis (biopsy + May 18, 2021)diarrhea: patient is on  diphenoxylate/atropine for his diarrhea on a regular basis. he was seen by Dr De La Paz (who has just recently retired). he is controlled on dipheoxylate-atropine. he does on occasion during the month require an additional dose. patient continues to do well on his current management      diastolic chf: Patient denies any chest pain, angina or exertional dyspnea. patient denies any swelling to lower extremities. Patient follows with cardiology on a regular basis     echo from feb 2020 ef 60% with moderate to severe aortic valve stenosis and mild tr.      Diabetes Type 2: patient denies any low blood sugars. Patient is following with opthalmology on a yearly basis. Patient is taking glimepiride a1c 5s typically. patient following with dr christina.      hypothyroid: patient denies any hypo or hypothyroid symptoms. patient denies any palpitations, diarrhea or constipation     HLD: Patient denies any muscle aches and is tolerating statin therapy     pvd: denies any previous strokes, no headaches. no claudication     spinal stenosis: pain intermittent, in gluteal region     social: patient denies any smoking, drug or alcohol abuse     surgical history: left hip replacement 11/2017     COLONOSCOPY MAY 2021 [facility] with 5 year follow up      HOSPITALIZATIONS  @[facility] from October 30, 2023 to November 3, 2023 patient's was hospitalized for subjective fevers and sweats.  He was found to have a UTI and left pyelonephritis.  Patient was treated for E. coli growing in his urine sensitive to most antibiotics except ampicillin and Unasyn.  Patient was recommended to continue on Cipro 500 mg twice a day for an additional 10 days posthospitalization.  He is noted to have a left solid renal cyst.  An MRI of the abdomen was done showing pyelonephritis but also a 1.6 cm left renal lesion which is characteristic of a hemorrhagic cyst.  Patient's losartan was still on hold upon discharge.        September 3  "to September 7, 2022 @[facility] . Patient came in originally to [facility] for shortness of breath and chest pain. Patient was found to have acute pericarditis along with moderate-sized pericardial effusion. He was placed on colchicine twice a day and his symptoms dramatically improved. Cardiology as well as endocrinology was following him and he was discharged on colchicine.     OARRS:  No data recorded  I have personally reviewed the OARRS report for Curtis Brown. I have considered the risks of abuse, dependence, addiction and diversion    Is the patient prescribed a combination of a benzodiazepine and opioid?  Yes, I feel it is clincially indicated to continue the medication and have discussed with the patient risks/benefits/alternatives.    Last Urine Drug Screen / ordered today: Yes  No results found for this or any previous visit (from the past 8760 hour(s)).    Results are as expected.          Reviewed Controlled Substance Agreement including but not limited to the benefits, risks, and alternatives to treatment with a Controlled Substance medication(s).    Antidiarrheal:   What is the patient's goal of therapy?  Improve diarrhea   Is this being achieved with current treatment? yes  Is the patient currently prescribed an opioid, and/or benzodiazepine?   Yes, I feel it is clincially indicated to continue the medication and have discussed with the patient risks/benefits/alternatives.    Activities of Daily Living:   Is your overall impression that this patient is benefiting (symptom reduction outweighs side effects) from antidiarrheal therapy? No     1. Physical Functioning: Better  2. Family Relationship: Better  3. Social Relationship: Better  4. Mood: Better  5. Sleep Patterns: Better  6. Overall Function: Better      Visit Vitals  /68 (BP Location: Left arm, Patient Position: Sitting, BP Cuff Size: Large adult)   Pulse 74   Ht 1.727 m (5' 8\")   Wt 87.5 kg (193 lb)   SpO2 98%   BMI 29.35 " kg/m²   Smoking Status Never   BSA 2.05 m²     PHYSICAL EXAM   General appearance: Alert and in no acute distress. speech is clear and coherent  No head trauma  Neck: no carotid bruits or thyromegaly. no lymphadenopathy   Respiratory : No respiratory distress, normal respiratory rhythm and effort. Clear bilateral breath sounds. No wheezing or rhonchi.   Cardiovascular: heart rate regular, S1, S2. no murmurs. no Lower extremity edema  Skin inspection: Normal skin color and pigmentation, normal skin turgor and no visible rash, induration, or cellulitis  MSK: 5/5 strength upper and lower extremities without gait abnormalities. no loss of muscle mass . Left lower flank pain. No SI joint paint or central spine pain.   Neuro: 2-12 CN grossly intact.  no slurred speech. no lateralizing deficit  Psychiatric Orientation: Oriented to person, place, and time. no depression, homicidal or suicidal thoughts, normal affect     REVIEW OF SYSTEMS     Constitutional: not feeling tired and no fever, chills or sweats. Denies weight loss  no headache  Cardiovascular: no exertional chest pain, no palpitations, no lower extremity edema and no intermittent leg claudication.   Lungs: Denies shortness of breath, exertional dyspnea, wheezing  Gastrointestinal: no change in bowel habits, no diarrhea, no nausea, no vomiting and no abdominal pain. Denies Melena, brbpr or dark stool  Musculoskeletal: no myalgias, no muscle weakness and no limb swelling.   Skin: no rashes, no change in skin color and pigmentation and no skin lumps.   Neurological: no headaches, no seizures, no numbness, no lateralizing deficits and no fainting.   Psychiatric: no depression and no anxiety.   Urine: denies polyuria, hematuria, dysuria      Allergies   Allergen Reactions    Finasteride Unknown    Morphine Unknown    Penicillins Unknown    Valacyclovir Rash       Current Outpatient Medications   Medication Sig Dispense Refill    Accu-Chek Pippa Plus test strp strip  USE TO TEST ONCE DAILY.      Accu-Chek Fastclix Lancet Drum misc 1 Lancet early in the morning..      amLODIPine (Norvasc) 10 mg tablet Take 1 tablet (10 mg) by mouth once daily. 90 tablet 3    aspirin 81 mg EC tablet Take 1 tablet (81 mg) by mouth once daily.      biotin 1 mg capsule Take by mouth.      calcium carbonate-vit D3-min 600 mg calcium- 400 unit tablet Take by mouth.      clindamycin (Cleocin) 300 mg capsule Take 1 capsule (300 mg) by mouth. 1 HOUR BEFORE PROCEDURE AND 6 HOURS AFTER PROCEDURE AS DIRECTED      coenzyme Q-10 300 mg capsule capsule Take by mouth.      cyanocobalamin (Vitamin B-12) 100 mcg tablet Take by mouth.      diphenoxylate-atropine (Lomotil) 2.5-0.025 mg tablet Take 1 tablet by mouth once daily. With additional tablet in evening if needed 30 tablet 2    eye vitamin supplement (Ocuvite Eye Health Formula) capsule Take 1 tablet by mouth once daily.      famotidine (Pepcid) 40 mg tablet Take 1 tablet (40 mg) by mouth 2 times a day. 180 tablet 3    finasteride (Proscar) 5 mg tablet Take 1 tablet (5 mg) by mouth once daily. (Patient taking differently: Take 1.25 mg by mouth once daily.) 90 tablet 3    glimepiride (Amaryl) 1 mg tablet Take by mouth.      glimepiride (Amaryl) 2 mg tablet Take 1 tablet (2 mg) by mouth 2 times a day.      glucosamine-D3-Boswellia serr (Osteo Bi-Flex, 5-Loxin,) 1,500-400-100 mg-unit-mg tablet Take by mouth.      hydroCHLOROthiazide (HYDRODiuril) 25 mg tablet Take 1 tablet (25 mg) by mouth once daily. 90 tablet 3    lactobacillus acidophilus capsule Take 1 capsule by mouth once daily. With breakfast      levothyroxine (Synthroid, Levoxyl) 50 mcg tablet       Livalo 4 mg tablet       losartan (Cozaar) 50 mg tablet Take 1 tablet (50 mg) by mouth once daily.      metoprolol tartrate (Lopressor) 25 mg tablet Take 1 tablet (25 mg) by mouth 2 times a day.      multivitamin capsule Take by mouth.      nitroglycerin (Nitrostat) 0.4 mg SL tablet Place 1 tablet (0.4 mg)  under the tongue every 5 minutes if needed for chest pain. Under the tongue as needed for chest pain 25 tablet 2    omega-3s-dha-epa-fish oil 720-1,200 mg capsule,delayed release(DR/EC) Take by mouth.      potassium chloride CR (K-Tab) 20 mEq ER tablet Take by mouth.      primidone (Mysoline) 50 mg tablet Take 1 tablet (50 mg) by mouth once daily. 90 tablet 3    psyllium (Metamucil) powder Take by mouth.      vit A/vit C/vit E/zinc/copper (PRESERVISION AREDS ORAL) Take by mouth.      vit C/zinc citrate/elderberry (SAMBUCUS ELDERBERRY ORAL) Take by mouth.       No current facility-administered medications for this visit.       Objective     Hospital Outpatient Visit on 04/11/2024   Component Date Value Ref Range Status    AV pk gabriela 04/11/2024 3.93  m/s Final-Edited    AV mn grad 04/11/2024 34.4  mmHg Final-Edited    LVOT diam 04/11/2024 2.03  cm Final-Edited    LV Biplane EF 04/11/2024 47  % Final-Edited    MV E/A ratio 04/11/2024 0.54   Final-Edited    LA vol index A/L 04/11/2024 33.4  ml/m2 Final-Edited    Tricuspid annular plane systolic e* 04/11/2024 1.7  cm Final-Edited    RV free wall pk S' 04/11/2024 1.30  cm/s Final-Edited    LVIDd 04/11/2024 4.62  cm Final-Edited    RVSP 04/11/2024 34.7  mmHg Final-Edited    Aortic Valve Area by Continuity of* 04/11/2024 0.89  cm2 Final-Edited    Aortic Valve Area by Continuity of* 04/11/2024 0.77  cm2 Final-Edited    AV pk grad 04/11/2024 61.6  mmHg Final-Edited    LV A4C EF 04/11/2024 50.1   Final-Edited   Orders Only on 03/07/2024   Component Date Value Ref Range Status    [facility] MDEA- Samantha (cutoff 200 n* 03/07/2024 Not Detected   Final    [facility] Marijuana Metabolite (c* 03/07/2024 Negative   Final    [facility] Tramadol (cutoff 200 ng* 03/07/2024 Negative   Final    [facility] Norbuprenorphine (cutof* 03/07/2024 Not Detected   Final    [facility] Oxymorphone (cutoff 40 * 03/07/2024 Not Detected   Final    [facility] Oxazepam (cutoff 50 ng/* 03/07/2024 Not Detected    Final    [facility] Alpha-OH-Alprazolam (cu* 03/07/2024 Not Detected   Final    [facility] Creatinine, Urine 03/07/2024 144.4  20.0 - 400.0 mg/dL Final    [facility] MDMA- Ecstasy (cutoff 2* 03/07/2024 Not Detected   Final    [facility] Tapentadol-o-Sulf (cuto* 03/07/2024 Not Detected   Final    [facility] Buprenorphine (cutoff 5* 03/07/2024 Not Detected   Final    [facility] Oxycodone (cutoff 40 ng* 03/07/2024 Not Detected   Final    [facility] Pregabalin (cutoff 100 * 03/07/2024 Not Detected   Final    [facility] Benzoylecgonine (cutoff* 03/07/2024 Negative   Final    [facility] Management [facility]* 03/07/2024 See Below   Final    [facility] Zolpidem Metabolite (cu* 03/07/2024 Not Detected   Final    [facility] Meperidine metabolite (* 03/07/2024 Not Detected   Final    [facility] Norhydrocodone  (cutoff* 03/07/2024 Not Detected   Final    [facility] Morphine (cutoff 20 ng/* 03/07/2024 Not Detected   Final    [facility] Midazolam (cutoff 20 ng* 03/07/2024 Not Detected   Final    [facility] Diazepam (cutoff 50 ng/* 03/07/2024 Not Detected   Final    [facility] Methylphenidate (cutoff* 03/07/2024 Not Detected   Final    [facility] PCP (cutoff 25 ng/mL) 03/07/2024 Negative   Final    [facility] Alpha-OH-Midazolam (cut* 03/07/2024 Not Detected   Final    [facility] Amphetamine (cutoff 50 * 03/07/2024 Not Detected   Final    [facility] Fentanyl (cutoff 2 ng/m* 03/07/2024 Not Detected   Final    [facility] Noroxymorphone  (cutoff* 03/07/2024 Not Detected   Final    [facility]* 03/07/2024 See Note   Final    [facility] Temazepam (cutoff 50 ng* 03/07/2024 Not Detected   Final    [facility] Clonazepam (cutoff 20 n* 03/07/2024 Not Detected   Final    [facility] MDA (cutoff 200 ng/mL) 03/07/2024 Not Detected   Final    [facility] Ethyl Glucuronide (cuto* 03/07/2024 Negative   Final    [facility] Methadone (cutoff 150 n* 03/07/2024 Negative   Final    [facility] Naloxone (cutoff 100 ng*  03/07/2024 Not Detected   Final    [facility] Noroxycodone  (cutoff 1* 03/07/2024 Not Detected   Final    [facility] Nordiazepam (cutoff 50 * 03/07/2024 Not Detected   Final    [facility] Alprazolam (cutoff 40 n* 03/07/2024 Not Detected   Final    [facility] EER Pain Mgt Maurer, Mass * 03/07/2024 See Note   Final    [facility] Barbiturates (cutoff 20* 03/07/2024 Negative   Final    [facility] Tapentadol (cutoff 100 * 03/07/2024 Not Detected   Final    [facility] Hydromorphone (cutoff 2* 03/07/2024 Not Detected   Final    [facility] 6-acetylmorphine (cutof* 03/07/2024 Not Detected   Final    [facility] Gabapentin (cutoff 100 * 03/07/2024 Not Detected   Final    [facility] Phentermine (cutoff 100* 03/07/2024 Not Detected   Final    [facility] Carisoprodol (cut-off 1* 03/07/2024 Negative   Final    [facility] Methamphetamine (cutoff* 03/07/2024 Not Detected   Final    [facility] Zolpidem (cutoff 20 ng/* 03/07/2024 Not Detected   Final    [facility] Norfentanyl (cutoff 2 n* 03/07/2024 Not Detected   Final    [facility] Hydrocodone (cutoff 40 * 03/07/2024 Not Detected   Final    [facility] Codeine (cutoff 40 ng/m* 03/07/2024 Not Detected   Final    [facility] Lorazepam (cutoff 60 ng* 03/07/2024 Not Detected   Final    [facility] 7-Aminoclonazepam (cuto* 03/07/2024 Not Detected   Final       Radiology: Reviewed imaging in powerchart.  No results found.    Family History   Problem Relation Name Age of Onset    Stroke Mother      Heart failure Mother      Other (hepatic cirrhosis) Father      Parkinsonism Brother       Social History     Socioeconomic History    Marital status:      Spouse name: None    Number of children: None    Years of education: None    Highest education level: None   Occupational History    None   Tobacco Use    Smoking status: Never    Smokeless tobacco: Never   Substance and Sexual Activity    Alcohol use: Never    Drug use: Never    Sexual activity: None   Other Topics Concern    None   Social  History Narrative    None     Social Determinants of Health     Financial Resource Strain: Not on file   Food Insecurity: Not on file   Transportation Needs: Not on file   Physical Activity: Not on file   Stress: Not on file   Social Connections: Not on file   Intimate Partner Violence: Not on file   Housing Stability: Not on file     Past Medical History:   Diagnosis Date    Abnormal result of other cardiovascular function study     Abnormal nuclear stress test    Acute ischemic heart disease, unspecified (Multi)     ACS (acute coronary syndrome)    Atherosclerotic heart disease of native coronary artery without angina pectoris     Arteriosclerotic coronary artery disease    Chronic sinusitis, unspecified     Chronic congestion of paranasal sinus    Coronary angioplasty status     History of percutaneous coronary intervention    Encounter for preprocedural cardiovascular examination     Pre-operative cardiovascular examination    Encounter for screening for malignant neoplasm of prostate     Screening PSA (prostate specific antigen)    Lyme disease, unspecified 12/02/2013    Lyme disease    Other cervical disc displacement, unspecified cervical region     Herniated cervical disc without myelopathy    Other specified postprocedural states     History of carpal tunnel surgery    Other systemic sclerosis (Multi)     Cutaneous scleroderma    Personal history of malignant neoplasm, unspecified     History of malignant neoplasm    Personal history of other diseases of the circulatory system     History of hypertension    Personal history of other diseases of the circulatory system     History of abnormal electrocardiography    Personal history of other diseases of the digestive system     History of gastroesophageal reflux (GERD)    Personal history of other diseases of the musculoskeletal system and connective tissue     History of low back pain    Personal history of other diseases of the musculoskeletal system and  "connective tissue     History of spinal stenosis    Personal history of other diseases of the musculoskeletal system and connective tissue     History of arthritis    Personal history of other endocrine, nutritional and metabolic disease     History of hypothyroidism    Personal history of other endocrine, nutritional and metabolic disease     History of hyperlipidemia    Personal history of other endocrine, nutritional and metabolic disease     History of type 2 diabetes mellitus    Personal history of other endocrine, nutritional and metabolic disease     History of hypoglycemia    Personal history of other specified conditions     History of chest pain    Polyp of stomach and duodenum     Gastric polyp     Past Surgical History:   Procedure Laterality Date    CORONARY ANGIOPLASTY WITH STENT PLACEMENT  07/27/2018    Cath Placement Of Stent 1    HAND SURGERY  07/27/2018    Hand Surgery                                                                                                                                                          OTHER SURGICAL HISTORY  07/27/2018    Surgery    OTHER SURGICAL HISTORY  07/27/2018    Total Hip Replacement Left       Charting was completed using voice recognition technology and may include unintended errors.     Subjective   Patient ID: Curtis Brown is a 83 y.o. male who presents for the following    Assessment/Plan       HPI      Visit Vitals  /68 (BP Location: Left arm, Patient Position: Sitting, BP Cuff Size: Large adult)   Pulse 74   Ht 1.727 m (5' 8\")   Wt 87.5 kg (193 lb)   SpO2 98%   BMI 29.35 kg/m²   Smoking Status Never   BSA 2.05 m²     PHYSICAL EXAM       REVIEW OF SYSTEMS       Allergies   Allergen Reactions    Finasteride Unknown    Morphine Unknown    Penicillins Unknown    Valacyclovir Rash       Current Outpatient Medications   Medication Sig Dispense Refill    Accu-Chek Pippa Plus test strp strip USE TO TEST ONCE DAILY.      Accu-Chek Fastclix Lancet " Drum misc 1 Lancet early in the morning..      amLODIPine (Norvasc) 10 mg tablet Take 1 tablet (10 mg) by mouth once daily. 90 tablet 3    aspirin 81 mg EC tablet Take 1 tablet (81 mg) by mouth once daily.      biotin 1 mg capsule Take by mouth.      calcium carbonate-vit D3-min 600 mg calcium- 400 unit tablet Take by mouth.      clindamycin (Cleocin) 300 mg capsule Take 1 capsule (300 mg) by mouth. 1 HOUR BEFORE PROCEDURE AND 6 HOURS AFTER PROCEDURE AS DIRECTED      coenzyme Q-10 300 mg capsule capsule Take by mouth.      cyanocobalamin (Vitamin B-12) 100 mcg tablet Take by mouth.      diphenoxylate-atropine (Lomotil) 2.5-0.025 mg tablet Take 1 tablet by mouth once daily. With additional tablet in evening if needed 30 tablet 2    eye vitamin supplement (Ocuvite Eye Health Formula) capsule Take 1 tablet by mouth once daily.      famotidine (Pepcid) 40 mg tablet Take 1 tablet (40 mg) by mouth 2 times a day. 180 tablet 3    finasteride (Proscar) 5 mg tablet Take 1 tablet (5 mg) by mouth once daily. (Patient taking differently: Take 1.25 mg by mouth once daily.) 90 tablet 3    glimepiride (Amaryl) 1 mg tablet Take by mouth.      glimepiride (Amaryl) 2 mg tablet Take 1 tablet (2 mg) by mouth 2 times a day.      glucosamine-D3-Boswellia serr (Osteo Bi-Flex, 5-Loxin,) 1,500-400-100 mg-unit-mg tablet Take by mouth.      hydroCHLOROthiazide (HYDRODiuril) 25 mg tablet Take 1 tablet (25 mg) by mouth once daily. 90 tablet 3    lactobacillus acidophilus capsule Take 1 capsule by mouth once daily. With breakfast      levothyroxine (Synthroid, Levoxyl) 50 mcg tablet       Livalo 4 mg tablet       losartan (Cozaar) 50 mg tablet Take 1 tablet (50 mg) by mouth once daily.      metoprolol tartrate (Lopressor) 25 mg tablet Take 1 tablet (25 mg) by mouth 2 times a day.      multivitamin capsule Take by mouth.      nitroglycerin (Nitrostat) 0.4 mg SL tablet Place 1 tablet (0.4 mg) under the tongue every 5 minutes if needed for chest  pain. Under the tongue as needed for chest pain 25 tablet 2    omega-3s-dha-epa-fish oil 720-1,200 mg capsule,delayed release(DR/EC) Take by mouth.      potassium chloride CR (K-Tab) 20 mEq ER tablet Take by mouth.      primidone (Mysoline) 50 mg tablet Take 1 tablet (50 mg) by mouth once daily. 90 tablet 3    psyllium (Metamucil) powder Take by mouth.      vit A/vit C/vit E/zinc/copper (PRESERVISION AREDS ORAL) Take by mouth.      vit C/zinc citrate/elderberry (SAMBUCUS ELDERBERRY ORAL) Take by mouth.       No current facility-administered medications for this visit.       Objective     Hospital Outpatient Visit on 04/11/2024   Component Date Value Ref Range Status    AV pk gabriela 04/11/2024 3.93  m/s Final-Edited    AV mn grad 04/11/2024 34.4  mmHg Final-Edited    LVOT diam 04/11/2024 2.03  cm Final-Edited    LV Biplane EF 04/11/2024 47  % Final-Edited    MV E/A ratio 04/11/2024 0.54   Final-Edited    LA vol index A/L 04/11/2024 33.4  ml/m2 Final-Edited    Tricuspid annular plane systolic e* 04/11/2024 1.7  cm Final-Edited    RV free wall pk S' 04/11/2024 1.30  cm/s Final-Edited    LVIDd 04/11/2024 4.62  cm Final-Edited    RVSP 04/11/2024 34.7  mmHg Final-Edited    Aortic Valve Area by Continuity of* 04/11/2024 0.89  cm2 Final-Edited    Aortic Valve Area by Continuity of* 04/11/2024 0.77  cm2 Final-Edited    AV pk grad 04/11/2024 61.6  mmHg Final-Edited    LV A4C EF 04/11/2024 50.1   Final-Edited   Orders Only on 03/07/2024   Component Date Value Ref Range Status    [facility] MDEA- Samantha (cutoff 200 n* 03/07/2024 Not Detected   Final    [facility] Marijuana Metabolite (c* 03/07/2024 Negative   Final    [facility] Tramadol (cutoff 200 ng* 03/07/2024 Negative   Final    [facility] Norbuprenorphine (cutof* 03/07/2024 Not Detected   Final    [facility] Oxymorphone (cutoff 40 * 03/07/2024 Not Detected   Final    [facility] Oxazepam (cutoff 50 ng/* 03/07/2024 Not Detected   Final    [facility] Alpha-OH-Alprazolam (cu*  03/07/2024 Not Detected   Final    [facility] Creatinine, Urine 03/07/2024 144.4  20.0 - 400.0 mg/dL Final    [facility] MDMA- Ecstasy (cutoff 2* 03/07/2024 Not Detected   Final    [facility] Tapentadol-o-Sulf (cuto* 03/07/2024 Not Detected   Final    [facility] Buprenorphine (cutoff 5* 03/07/2024 Not Detected   Final    [facility] Oxycodone (cutoff 40 ng* 03/07/2024 Not Detected   Final    [facility] Pregabalin (cutoff 100 * 03/07/2024 Not Detected   Final    [facility] Benzoylecgonine (cutoff* 03/07/2024 Negative   Final    [facility]* 03/07/2024 See Below   Final    [facility] Zolpidem Metabolite (cu* 03/07/2024 Not Detected   Final    [facility] Meperidine metabolite (* 03/07/2024 Not Detected   Final    [facility] Norhydrocodone  (cutoff* 03/07/2024 Not Detected   Final    [facility] Morphine (cutoff 20 ng/* 03/07/2024 Not Detected   Final    [facility] Midazolam (cutoff 20 ng* 03/07/2024 Not Detected   Final    [facility] Diazepam (cutoff 50 ng/* 03/07/2024 Not Detected   Final    [facility] Methylphenidate (cutoff* 03/07/2024 Not Detected   Final    [facility] PCP (cutoff 25 ng/mL) 03/07/2024 Negative   Final    [facility] Alpha-OH-Midazolam (cut* 03/07/2024 Not Detected   Final    [facility] Amphetamine (cutoff 50 * 03/07/2024 Not Detected   Final    [facility] Fentanyl (cutoff 2 ng/m* 03/07/2024 Not Detected   Final    [facility] Noroxymorphone  (cutoff* 03/07/2024 Not Detected   Final    [facility] 03/07/2024 See Note   Final    [facility] Temazepam (cutoff 50 ng* 03/07/2024 Not Detected   Final    [facility] Clonazepam (cutoff 20 n* 03/07/2024 Not Detected   Final    [facility] MDA (cutoff 200 ng/mL) 03/07/2024 Not Detected   Final    [facility] Ethyl Glucuronide (cuto* 03/07/2024 Negative   Final    [facility] Methadone (cutoff 150 n* 03/07/2024 Negative   Final    [facility] Naloxone (cutoff 100 ng* 03/07/2024 Not Detected   Final    [facility]  Noroxycodone  (cutoff 1* 03/07/2024 Not Detected   Final    [facility] Nordiazepam (cutoff 50 * 03/07/2024 Not Detected   Final    [facility] Alprazolam (cutoff 40 n* 03/07/2024 Not Detected   Final    [facility] [place], [place] * 03/07/2024 See Note   Final    [facility] Barbiturates (cutoff 20* 03/07/2024 Negative   Final    [facility] Tapentadol (cutoff 100 * 03/07/2024 Not Detected   Final    [facility] Hydromorphone (cutoff 2* 03/07/2024 Not Detected   Final    [facility] 6-acetylmorphine (cutof* 03/07/2024 Not Detected   Final    [facility] Gabapentin (cutoff 100 * 03/07/2024 Not Detected   Final    [facility] Phentermine (cutoff 100* 03/07/2024 Not Detected   Final    [facility] Carisoprodol (cut-off 1* 03/07/2024 Negative   Final    [facility] Methamphetamine (cutoff* 03/07/2024 Not Detected   Final    [facility] Zolpidem (cutoff 20 ng/* 03/07/2024 Not Detected   Final    [facility] Norfentanyl (cutoff 2 n* 03/07/2024 Not Detected   Final    [facility] Hydrocodone (cutoff 40 * 03/07/2024 Not Detected   Final    [facility] Codeine (cutoff 40 ng/m* 03/07/2024 Not Detected   Final    [facility] Lorazepam (cutoff 60 ng* 03/07/2024 Not Detected   Final    [facility] 7-Aminoclonazepam (cuto* 03/07/2024 Not Detected   Final       Radiology: Reviewed imaging in powerchart.  No results found.    Family History   Problem Relation Name Age of Onset    Stroke Mother      Heart failure Mother      Other (hepatic cirrhosis) Father      Parkinsonism Brother       Social History     Socioeconomic History    Marital status:      Spouse name: None    Number of children: None    Years of education: None    Highest education level: None   Occupational History    None   Tobacco Use    Smoking status: Never    Smokeless tobacco: Never   Substance and Sexual Activity    Alcohol use: Never    Drug use: Never    Sexual activity: None   Other Topics Concern    None   Social History Narrative    None     Social  Determinants of Health     Financial Resource Strain: Not on file   Food Insecurity: Not on file   Transportation Needs: Not on file   Physical Activity: Not on file   Stress: Not on file   Social Connections: Not on file   Intimate Partner Violence: Not on file   Housing Stability: Not on file     Past Medical History:   Diagnosis Date    Abnormal result of other cardiovascular function study     Abnormal nuclear stress test    Acute ischemic heart disease, unspecified (Multi)     ACS (acute coronary syndrome)    Atherosclerotic heart disease of native coronary artery without angina pectoris     Arteriosclerotic coronary artery disease    Chronic sinusitis, unspecified     Chronic congestion of paranasal sinus    Coronary angioplasty status     History of percutaneous coronary intervention    Encounter for preprocedural cardiovascular examination     Pre-operative cardiovascular examination    Encounter for screening for malignant neoplasm of prostate     Screening PSA (prostate specific antigen)    Lyme disease, unspecified 12/02/2013    Lyme disease    Other cervical disc displacement, unspecified cervical region     Herniated cervical disc without myelopathy    Other specified postprocedural states     History of carpal tunnel surgery    Other systemic sclerosis (Multi)     Cutaneous scleroderma    Personal history of malignant neoplasm, unspecified     History of malignant neoplasm    Personal history of other diseases of the circulatory system     History of hypertension    Personal history of other diseases of the circulatory system     History of abnormal electrocardiography    Personal history of other diseases of the digestive system     History of gastroesophageal reflux (GERD)    Personal history of other diseases of the musculoskeletal system and connective tissue     History of low back pain    Personal history of other diseases of the musculoskeletal system and connective tissue     History of spinal  stenosis    Personal history of other diseases of the musculoskeletal system and connective tissue     History of arthritis    Personal history of other endocrine, nutritional and metabolic disease     History of hypothyroidism    Personal history of other endocrine, nutritional and metabolic disease     History of hyperlipidemia    Personal history of other endocrine, nutritional and metabolic disease     History of type 2 diabetes mellitus    Personal history of other endocrine, nutritional and metabolic disease     History of hypoglycemia    Personal history of other specified conditions     History of chest pain    Polyp of stomach and duodenum     Gastric polyp     Past Surgical History:   Procedure Laterality Date    CORONARY ANGIOPLASTY WITH STENT PLACEMENT  07/27/2018    Cath Placement Of Stent 1    HAND SURGERY  07/27/2018    Hand Surgery                                                                                                                                                          OTHER SURGICAL HISTORY  07/27/2018    Surgery    OTHER SURGICAL HISTORY  07/27/2018    Total Hip Replacement Left       Charting was completed using voice recognition technology and may include unintended errors.

## 2024-04-15 NOTE — TELEPHONE ENCOUNTER
TCM, discharged on 4/7/24 , patient did receive their discharge medications. Patient does not require immediate attention of the attending at this time and is scheduled for an appointment on 4/15/24.

## 2024-04-19 LAB
6MAM UR CFM-MCNC: <25 NG/ML
CODEINE UR CFM-MCNC: <50 NG/ML
HYDROCODONE CTO UR CFM-MCNC: 202 NG/ML
HYDROMORPHONE UR CFM-MCNC: 183 NG/ML
MORPHINE UR CFM-MCNC: <50 NG/ML
NORHYDROCODONE UR CFM-MCNC: 262 NG/ML
NOROXYCODONE UR CFM-MCNC: <25 NG/ML
OXYCODONE UR CFM-MCNC: <25 NG/ML
OXYMORPHONE UR CFM-MCNC: <25 NG/ML

## 2024-04-29 ENCOUNTER — LAB REQUISITION (OUTPATIENT)
Dept: LAB | Facility: HOSPITAL | Age: 84
End: 2024-04-29
Payer: MEDICARE

## 2024-04-29 ENCOUNTER — APPOINTMENT (OUTPATIENT)
Dept: PRIMARY CARE | Facility: CLINIC | Age: 84
End: 2024-04-29
Payer: MEDICARE

## 2024-04-30 LAB
LABORATORY COMMENT REPORT: NORMAL
PATH REPORT.GROSS SPEC: NORMAL
PATH REPORT.TOTAL CANCER: NORMAL

## 2024-05-29 ENCOUNTER — OFFICE VISIT (OUTPATIENT)
Dept: PRIMARY CARE | Facility: CLINIC | Age: 84
End: 2024-05-29
Payer: MEDICARE

## 2024-05-29 ENCOUNTER — LAB (OUTPATIENT)
Dept: LAB | Facility: LAB | Age: 84
End: 2024-05-29
Payer: MEDICARE

## 2024-05-29 VITALS
SYSTOLIC BLOOD PRESSURE: 122 MMHG | DIASTOLIC BLOOD PRESSURE: 71 MMHG | HEART RATE: 74 BPM | OXYGEN SATURATION: 94 % | BODY MASS INDEX: 29.25 KG/M2 | WEIGHT: 192.4 LBS

## 2024-05-29 DIAGNOSIS — I50.32 CHRONIC DIASTOLIC CONGESTIVE HEART FAILURE (MULTI): Primary | ICD-10-CM

## 2024-05-29 DIAGNOSIS — Z79.899 MEDICATION MANAGEMENT: ICD-10-CM

## 2024-05-29 DIAGNOSIS — E78.5 HYPERLIPIDEMIA ASSOCIATED WITH TYPE 2 DIABETES MELLITUS (MULTI): ICD-10-CM

## 2024-05-29 DIAGNOSIS — E11.9 TYPE 2 DIABETES MELLITUS WITHOUT COMPLICATION, WITHOUT LONG-TERM CURRENT USE OF INSULIN (MULTI): ICD-10-CM

## 2024-05-29 DIAGNOSIS — E11.69 HYPERLIPIDEMIA ASSOCIATED WITH TYPE 2 DIABETES MELLITUS (MULTI): ICD-10-CM

## 2024-05-29 DIAGNOSIS — E03.9 HYPOTHYROIDISM, UNSPECIFIED TYPE: ICD-10-CM

## 2024-05-29 DIAGNOSIS — I10 PRIMARY HYPERTENSION: ICD-10-CM

## 2024-05-29 DIAGNOSIS — M48.10 DISH (DIFFUSE IDIOPATHIC SKELETAL HYPEROSTOSIS): ICD-10-CM

## 2024-05-29 LAB
AMPHETAMINES UR QL SCN: NORMAL
BARBITURATES UR QL SCN: NORMAL
BENZODIAZ UR QL SCN: NORMAL
BZE UR QL SCN: NORMAL
CANNABINOIDS UR QL SCN: NORMAL
FENTANYL+NORFENTANYL UR QL SCN: NORMAL
METHADONE UR QL SCN: NORMAL
OPIATES UR QL SCN: NORMAL
OXYCODONE+OXYMORPHONE UR QL SCN: NORMAL
PCP UR QL SCN: NORMAL

## 2024-05-29 PROCEDURE — 3078F DIAST BP <80 MM HG: CPT | Performed by: INTERNAL MEDICINE

## 2024-05-29 PROCEDURE — 1159F MED LIST DOCD IN RCRD: CPT | Performed by: INTERNAL MEDICINE

## 2024-05-29 PROCEDURE — 99214 OFFICE O/P EST MOD 30 MIN: CPT | Performed by: INTERNAL MEDICINE

## 2024-05-29 PROCEDURE — 3074F SYST BP LT 130 MM HG: CPT | Performed by: INTERNAL MEDICINE

## 2024-05-29 PROCEDURE — 80307 DRUG TEST PRSMV CHEM ANLYZR: CPT

## 2024-05-29 PROCEDURE — 1036F TOBACCO NON-USER: CPT | Performed by: INTERNAL MEDICINE

## 2024-05-29 PROCEDURE — G2211 COMPLEX E/M VISIT ADD ON: HCPCS | Performed by: INTERNAL MEDICINE

## 2024-05-29 RX ORDER — HYDROCODONE BITARTRATE AND ACETAMINOPHEN 5; 325 MG/1; MG/1
2 TABLET ORAL EVERY 12 HOURS PRN
Qty: 120 TABLET | Refills: 0 | Status: SHIPPED | OUTPATIENT
Start: 2024-05-29 | End: 2024-06-28

## 2024-05-29 NOTE — PROGRESS NOTES
Subjective   Patient ID: Curtis Brown is a 83 y.o. 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   Following with urology getting repeat ultrasound May 14, 2025    Hx spinal stenosis following with dr thorpe and dr jones soon   Patient may not require hydrocodone after he has a steroid injection to spine.   Urine drug screen 5/29/2024 ordered  Hydrocodone 5-325mg bid #120  Controlled substance agreement signed 4/15/2024     lymphocytic colitis diarrhea: stable on diphenoxylate  #30    Not due yet    Hx SEPSIS ACUTE PYELONEPHROSIS 10/2023: resolved at this time    Slight anemia hgb trending 11-12s lately but 1 year ago his hgb was 15. Iron/b12/folte wnl.     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  improved, continue on   hctz to 25mg daily ,   amlodipine 10mg daily.   Losartan 50mg daily      hld: stable, continue statin. LDL < 70. Patient wants to change Lipitor to Crestor per patient's request. (Patient went to Dr Rodrigues and will get pitavastatin)     Dry macular degeneration: stable following with Dr Andrew @ [facility]     dm2: stable, 6s    Patient follows with dr rodrigues   5 mg tablet Amaryl daily . Watch for low sugars.      PVD: stable, continue statin and aspirin      diastolic CHF and severe aortic stenosis: stable, patient is very active. stable. getting echo with dr montes twice a year      BPH: patient is taking finasteride. He denies any complaints and he needs a refill       patient does not have children. needs to consider POA outs       Colonoscopy may 2021 with dr jenkins            hpi  male cad s/p stent 11/2013, PVD, htn, hld comes for the following    Patient was hospitalized at [facility] from April 5, 2024 to April 7, 2024 as he had left-sided flank pain that radiated down to his left groin into his testicles.  He says this woke him up out of his early sleep he decided come emergency department for further assessment.  CT scan of the abdomen pelvis shows a left renal mass which is a known mass.  However there is some mild perinephric stranding as well.  Urinalysis was negative for infection labs were unremarkable.  His pain was much better controlled he was seen by urology who did not see any evidence of urological cause.  Heme-onc saw the patient and agreed about the left renal mass being a longstanding issue without any further intervention.  On going pain with his left flank and groin at home. He is woken up in the middle of the night and he is going to get a biopsy of the left kidney mass. He would like to have some pain killers. He says he had an old hydrocodone at home and it was quite helpful to get back to sleep but he does not have any medications.        HX PERICARDITIS AND PERICARDIAL EFFUSION sEPTEMBER 2022: this has since resolved. and he follows with cardiology regularly for this     Resting tremors: worsening in his hands and he is UNABLE TO  hold objects in his hands.     lymphocytic colitis (biopsy + May 18, 2021)diarrhea: patient is on diphenoxylate/atropine for his diarrhea on a regular basis. he was seen by Dr De La Paz (who has just recently retired). he is controlled on dipheoxylate-atropine. he does on occasion during the month require an additional dose. patient continues to do well on his current management      diastolic chf: Patient denies any chest pain, angina or exertional dyspnea. patient denies any swelling to lower extremities. Patient follows with cardiology on a regular basis     echo from feb 2020 ef 60% with moderate to severe aortic  valve stenosis and mild tr.      Diabetes Type 2: patient denies any low blood sugars. Patient is following with opthalmology on a yearly basis. Patient is taking glimepiride a1c 5s typically. patient following with dr christina.      hypothyroid: patient denies any hypo or hypothyroid symptoms. patient denies any palpitations, diarrhea or constipation     HLD: Patient denies any muscle aches and is tolerating statin therapy     pvd: denies any previous strokes, no headaches. no claudication     spinal stenosis: pain intermittent, in gluteal region     social: patient denies any smoking, drug or alcohol abuse     surgical history: left hip replacement 11/2017     [facility]COPY MAY 2021 [facility] with 5 year follow up      HOSPITALIZATIONS  @[facility] from October 30, 2023 to November 3, 2023 patient's was hospitalized for subjective fevers and sweats.  He was found to have a UTI and left pyelonephritis.  Patient was treated for E. coli growing in his urine sensitive to most antibiotics except ampicillin and Unasyn.  Patient was recommended to continue on Cipro 500 mg twice a day for an additional 10 days posthospitalization.  He is noted to have a left solid renal cyst.  An MRI of the abdomen was done showing pyelonephritis but also a 1.6 cm left renal lesion which is characteristic of a hemorrhagic cyst.  Patient's losartan was still on hold upon discharge.        September 3 to September 7, 2022 @[facility] . Patient came in originally to [facility] for shortness of breath and chest pain. Patient was found to have acute pericarditis along with moderate-sized pericardial effusion. He was placed on colchicine twice a day and his symptoms dramatically improved. Cardiology as well as endocrinology was following him and he was discharged on colchicine.     OARRS:  No data recorded  I have personally reviewed the OARRS report for Curtis Brown. I have considered the risks of abuse,  dependence, addiction and diversion    Is the patient prescribed a combination of a benzodiazepine and opioid?  Yes, I feel it is clincially indicated to continue the medication and have discussed with the patient risks/benefits/alternatives.    Last Urine Drug Screen / ordered today: Yes  Recent Results (from the past 8760 hour(s))   Opiate Confirmation, Urine    Collection Time: 04/15/24  9:53 AM   Result Value Ref Range    6-Acetylmorphine <25 <25 ng/mL    Codeine <50 <50 ng/mL    Hydrocodone 202 (H) <25 ng/mL    Hydromorphone 183 (H) <25 ng/mL    Morphine  <50 <50 ng/mL    Norhydrocodone 262 (H) <25 ng/mL    Noroxycodone <25 <25 ng/mL    Oxycodone <25 <25 ng/mL    Oxymorphone <25 <25 ng/mL   Drug Screen, Urine With Reflex to Confirmation    Collection Time: 04/15/24  9:53 AM   Result Value Ref Range    Amphetamine Screen, Urine Presumptive Negative Presumptive Negative    Barbiturate Screen, Urine Presumptive Negative Presumptive Negative    Benzodiazepines Screen, Urine Presumptive Negative Presumptive Negative    Cannabinoid Screen, Urine Presumptive Negative Presumptive Negative    Cocaine Metabolite Screen, Urine Presumptive Negative Presumptive Negative    Fentanyl Screen, Urine Presumptive Negative Presumptive Negative    Opiate Screen, Urine Presumptive Positive (A) Presumptive Negative    Oxycodone Screen, Urine Presumptive Negative Presumptive Negative    PCP Screen, Urine Presumptive Negative Presumptive Negative    Methadone Screen, Urine Presumptive Negative Presumptive Negative       Results are as expected.          Reviewed Controlled Substance Agreement including but not limited to the benefits, risks, and alternatives to treatment with a Controlled Substance medication(s).    Antidiarrheal:   What is the patient's goal of therapy?  Improve diarrhea   Is this being achieved with current treatment? yes  Is the patient currently prescribed an opioid, and/or benzodiazepine?   Yes, I feel it is  clincially indicated to continue the medication and have discussed with the patient risks/benefits/alternatives.    Activities of Daily Living:   Is your overall impression that this patient is benefiting (symptom reduction outweighs side effects) from antidiarrheal therapy? No     1. Physical Functioning: Better  2. Family Relationship: Better  3. Social Relationship: Better  4. Mood: Better  5. Sleep Patterns: Better  6. Overall Function: Better      Visit Vitals  /71   Pulse 74   Wt 87.3 kg (192 lb 6.4 oz)   SpO2 94%   BMI 29.25 kg/m²   Smoking Status Never   BSA 2.05 m²     PHYSICAL EXAM   General appearance: Alert and in no acute distress. speech is clear and coherent  No head trauma  Neck: no carotid bruits or thyromegaly. no lymphadenopathy   Respiratory : No respiratory distress, normal respiratory rhythm and effort. Clear bilateral breath sounds. No wheezing or rhonchi.   Cardiovascular: heart rate regular, S1, S2. no murmurs. no Lower extremity edema  Skin inspection: Normal skin color and pigmentation, normal skin turgor and no visible rash, induration, or cellulitis  MSK: 5/5 strength upper and lower extremities without gait abnormalities. no loss of muscle mass . Left lower flank pain. No SI joint paint or central spine pain.   Neuro: 2-12 CN grossly intact.  no slurred speech. no lateralizing deficit  Psychiatric Orientation: Oriented to person, place, and time. no depression, homicidal or suicidal thoughts, normal affect     REVIEW OF SYSTEMS     Constitutional: not feeling tired and no fever, chills or sweats. Denies weight loss  no headache  Cardiovascular: no exertional chest pain, no palpitations, no lower extremity edema and no intermittent leg claudication.   Lungs: Denies shortness of breath, exertional dyspnea, wheezing  Gastrointestinal: no change in bowel habits, no diarrhea, no nausea, no vomiting and no abdominal pain. Denies Melena, brbpr or dark stool  Musculoskeletal: no myalgias,  no muscle weakness and no limb swelling.   Skin: no rashes, no change in skin color and pigmentation and no skin lumps.   Neurological: no headaches, no seizures, no numbness, no lateralizing deficits and no fainting.   Psychiatric: no depression and no anxiety.   Urine: denies polyuria, hematuria, dysuria      Allergies   Allergen Reactions    Morphine Unknown    Penicillins Unknown    Valacyclovir Rash       Current Outpatient Medications   Medication Sig Dispense Refill    Accu-Chek Pippa Plus test strp strip USE TO TEST ONCE DAILY.      Accu-Chek Fastclix Lancet Drum misc 1 Lancet early in the morning..      amLODIPine (Norvasc) 10 mg tablet Take 1 tablet (10 mg) by mouth once daily. 90 tablet 3    aspirin 81 mg EC tablet Take 1 tablet (81 mg) by mouth once daily.      biotin 1 mg capsule Take by mouth.      cyanocobalamin (Vitamin B-12) 100 mcg tablet Take by mouth.      diphenoxylate-atropine (Lomotil) 2.5-0.025 mg tablet Take 1 tablet by mouth once daily. With additional tablet in evening if needed 30 tablet 2    famotidine (Pepcid) 40 mg tablet Take 1 tablet (40 mg) by mouth 2 times a day. 180 tablet 3    finasteride (Proscar) 5 mg tablet Take 1 tablet (5 mg) by mouth once daily. (Patient taking differently: Take 1.25 mg by mouth once daily.) 90 tablet 3    glimepiride (Amaryl) 1 mg tablet Take by mouth.      glimepiride (Amaryl) 2 mg tablet Take 1 tablet (2 mg) by mouth 2 times a day.      hydroCHLOROthiazide (HYDRODiuril) 25 mg tablet Take 1 tablet (25 mg) by mouth once daily. 90 tablet 3    lactobacillus acidophilus capsule Take 1 capsule by mouth once daily. With breakfast      levothyroxine (Synthroid, Levoxyl) 50 mcg tablet       Livalo 4 mg tablet       losartan (Cozaar) 50 mg tablet Take 1 tablet (50 mg) by mouth once daily.      metoprolol tartrate (Lopressor) 25 mg tablet Take 1 tablet (25 mg) by mouth 2 times a day.      multivitamin capsule Take by mouth.      nitroglycerin (Nitrostat) 0.4 mg SL  tablet Place 1 tablet (0.4 mg) under the tongue every 5 minutes if needed for chest pain. Under the tongue as needed for chest pain 25 tablet 2    omega-3s-dha-epa-fish oil 720-1,200 mg capsule,delayed release(DR/EC) Take by mouth.      potassium chloride CR (K-Tab) 20 mEq ER tablet Take by mouth.      primidone (Mysoline) 50 mg tablet Take 1 tablet (50 mg) by mouth once daily. 90 tablet 3    psyllium (Metamucil) powder Take by mouth.      vit C/zinc citrate/elderberry (SAMBUCUS ELDERBERRY ORAL) Take by mouth.      calcium carbonate-vit D3-min 600 mg calcium- 400 unit tablet Take by mouth.      clindamycin (Cleocin) 300 mg capsule Take 1 capsule (300 mg) by mouth. 1 HOUR BEFORE PROCEDURE AND 6 HOURS AFTER PROCEDURE AS DIRECTED      coenzyme Q-10 300 mg capsule capsule Take by mouth.      eye vitamin supplement (Ocuvite Eye Health Formula) capsule Take 1 tablet by mouth once daily.      glucosamine-D3-Boswellia serr (Osteo Bi-Flex, 5-Loxin,) 1,500-400-100 mg-unit-mg tablet Take by mouth.      vit A/vit C/vit E/zinc/copper (PRESERVISION AREDS ORAL) Take by mouth.       No current facility-administered medications for this visit.       Objective     Lab Requisition on 04/29/2024   Component Date Value Ref Range Status    Case Report 04/29/2024    Final                    Value:Medical Cytology Report                           Case: [id number]                                  Authorizing Provider:  Lucita Trotter, Collected:           04/29/2024 Jose ROSARIO                                                                           Ordering Location:     [facility]       Received:            04/29/2024 1931                                     Center                                                                       Specimen:    RENAL PELVIS FLUID LEFT, Left Renal Kidney lesion and lesion fluid                         Gross Description 04/29/2024    Final                     Value:This result contains rich text formatting which cannot be displayed here.    Specimen Processing Detail 04/29/2024    Final                    Value:This result contains rich text formatting which cannot be displayed here.   Lab on 04/15/2024   Component Date Value Ref Range Status    Amphetamine Screen, Urine 04/15/2024 Presumptive Negative  Presumptive Negative Final    Barbiturate Screen, Urine 04/15/2024 Presumptive Negative  Presumptive Negative Final    Benzodiazepines Screen, Urine 04/15/2024 Presumptive Negative  Presumptive Negative Final    Cannabinoid Screen, Urine 04/15/2024 Presumptive Negative  Presumptive Negative Final    Cocaine Metabolite Screen, Urine 04/15/2024 Presumptive Negative  Presumptive Negative Final    Fentanyl Screen, Urine 04/15/2024 Presumptive Negative  Presumptive Negative Final    Opiate Screen, Urine 04/15/2024 Presumptive Positive (A)  Presumptive Negative Final    Oxycodone Screen, Urine 04/15/2024 Presumptive Negative  Presumptive Negative Final    PCP Screen, Urine 04/15/2024 Presumptive Negative  Presumptive Negative Final    Methadone Screen, Urine 04/15/2024 Presumptive Negative  Presumptive Negative Final    6-Acetylmorphine 04/15/2024 <25  <25 ng/mL Final    Codeine 04/15/2024 <50  <50 ng/mL Final    Hydrocodone 04/15/2024 202 (H)  <25 ng/mL Final    Hydromorphone 04/15/2024 183 (H)  <25 ng/mL Final    Morphine  04/15/2024 <50  <50 ng/mL Final    Norhydrocodone 04/15/2024 262 (H)  <25 ng/mL Final    Noroxycodone 04/15/2024 <25  <25 ng/mL Final    Oxycodone 04/15/2024 <25  <25 ng/mL Final    Oxymorphone 04/15/2024 <25  <25 ng/mL Final   Hospital Outpatient Visit on 04/11/2024   Component Date Value Ref Range Status    AV pk gabriela 04/11/2024 3.93  m/s Final-Edited    AV mn grad 04/11/2024 34.4  mmHg Final-Edited    LVOT diam 04/11/2024 2.03  cm Final-Edited    LV Biplane EF 04/11/2024 47  % Final-Edited    MV E/A ratio 04/11/2024 0.54   Final-Edited    LA vol  index A/L 04/11/2024 33.4  ml/m2 Final-Edited    Tricuspid annular plane systolic e* 04/11/2024 1.7  cm Final-Edited    RV free wall pk S' 04/11/2024 1.30  cm/s Final-Edited    LVIDd 04/11/2024 4.62  cm Final-Edited    RVSP 04/11/2024 34.7  mmHg Final-Edited    Aortic Valve Area by Continuity of* 04/11/2024 0.89  cm2 Final-Edited    Aortic Valve Area by Continuity of* 04/11/2024 0.77  cm2 Final-Edited    AV pk grad 04/11/2024 61.6  mmHg Final-Edited    LV A4C EF 04/11/2024 50.1   Final-Edited   Orders Only on 03/07/2024   Component Date Value Ref Range Status    [facility] MDEA- Samantha (cutoff 200 n* 03/07/2024 Not Detected   Final    [facility] Marijuana Metabolite (c* 03/07/2024 Negative   Final    [facility] Tramadol (cutoff 200 ng* 03/07/2024 Negative   Final    [facility] Norbuprenorphine (cutof* 03/07/2024 Not Detected   Final    [facility] Oxymorphone (cutoff 40 * 03/07/2024 Not Detected   Final    [facility] Oxazepam (cutoff 50 ng/* 03/07/2024 Not Detected   Final    [facility] Alpha-OH-Alprazolam (cu* 03/07/2024 Not Detected   Final    [facility] Creatinine, Urine 03/07/2024 144.4  20.0 - 400.0 mg/dL Final    [facility] MDMA- Ecstasy (cutoff 2* 03/07/2024 Not Detected   Final    [facility] Tapentadol-o-Sulf (cuto* 03/07/2024 Not Detected   Final    [facility] Buprenorphine (cutoff 5* 03/07/2024 Not Detected   Final    [facility] Oxycodone (cutoff 40 ng* 03/07/2024 Not Detected   Final    [facility] Pregabalin (cutoff 100 * 03/07/2024 Not Detected   Final    [facility] Benzoylecgonine (cutoff* 03/07/2024 Negative   Final    [facility]* 03/07/2024 See Below   Final    [facility] Zolpidem Metabolite (cu* 03/07/2024 Not Detected   Final    [facility] Meperidine metabolite (* 03/07/2024 Not Detected   Final    [facility] Norhydrocodone  (cutoff* 03/07/2024 Not Detected   Final    [facility] Morphine (cutoff 20 ng/* 03/07/2024 Not Detected   Final    [facility] Midazolam (cutoff 20 ng*  03/07/2024 Not Detected   Final    [facility] Diazepam (cutoff 50 ng/* 03/07/2024 Not Detected   Final    [facility] Methylphenidate (cutoff* 03/07/2024 Not Detected   Final    [facility] PCP (cutoff 25 ng/mL) 03/07/2024 Negative   Final    [facility] Alpha-OH-Midazolam (cut* 03/07/2024 Not Detected   Final    [facility] Amphetamine (cutoff 50 * 03/07/2024 Not Detected   Final    [facility] Fentanyl (cutoff 2 ng/m* 03/07/2024 Not Detected   Final    [facility] Noroxymorphone  (cutoff* 03/07/2024 Not Detected   Final    [facility]* 03/07/2024 See Note   Final    [facility] Temazepam (cutoff 50 ng* 03/07/2024 Not Detected   Final    [facility] Clonazepam (cutoff 20 n* 03/07/2024 Not Detected   Final    [facility] MDA (cutoff 200 ng/mL) 03/07/2024 Not Detected   Final    [facility] Ethyl Glucuronide (cuto* 03/07/2024 Negative   Final    [facility] Methadone (cutoff 150 n* 03/07/2024 Negative   Final    [facility] Naloxone (cutoff 100 ng* 03/07/2024 Not Detected   Final    [facility] Noroxycodone  (cutoff 1* 03/07/2024 Not Detected   Final    [facility] Nordiazepam (cutoff 50 * 03/07/2024 Not Detected   Final    [facility] Alprazolam (cutoff 40 n* 03/07/2024 Not Detected   Final    [facility] EER Pain Mgt [place], [place] * 03/07/2024 See Note   Final    [facility] Barbiturates (cutoff 20* 03/07/2024 Negative   Final    [facility] Tapentadol (cutoff 100 * 03/07/2024 Not Detected   Final    [facility] Hydromorphone (cutoff 2* 03/07/2024 Not Detected   Final    [facility] 6-acetylmorphine (cutof* 03/07/2024 Not Detected   Final    [facility] Gabapentin (cutoff 100 * 03/07/2024 Not Detected   Final    [facility] Phentermine (cutoff 100* 03/07/2024 Not Detected   Final    [facility] Carisoprodol (cut-off 1* 03/07/2024 Negative   Final    [facility] Methamphetamine (cutoff* 03/07/2024 Not Detected   Final    [facility] Zolpidem (cutoff 20 ng/* 03/07/2024 Not Detected   Final    [facility] Norfentanyl (cutoff  2 n* 03/07/2024 Not Detected   Final    [facility] Hydrocodone (cutoff 40 * 03/07/2024 Not Detected   Final    [facility] Codeine (cutoff 40 ng/m* 03/07/2024 Not Detected   Final    [facility] Lorazepam (cutoff 60 ng* 03/07/2024 Not Detected   Final    [facility] 7-Aminoclonazepam (cuto* 03/07/2024 Not Detected   Final       Radiology: Reviewed imaging in powerchart.  No results found.    Family History   Problem Relation Name Age of Onset    Stroke Mother      Heart failure Mother      Other (hepatic cirrhosis) Father      Parkinsonism Brother       Social History     Socioeconomic History    Marital status:      Spouse name: None    Number of children: None    Years of education: None    Highest education level: None   Occupational History    None   Tobacco Use    Smoking status: Never    Smokeless tobacco: Never   Substance and Sexual Activity    Alcohol use: Never    Drug use: Never    Sexual activity: None   Other Topics Concern    None   Social History Narrative    None     Social Determinants of Health     Financial Resource Strain: Not on file   Food Insecurity: Not on file   Transportation Needs: Not on file   Physical Activity: Not on file   Stress: Not on file   Social Connections: Not on file   Intimate Partner Violence: Not on file   Housing Stability: Not on file     Past Medical History:   Diagnosis Date    Abnormal result of other cardiovascular function study     Abnormal nuclear stress test    Acute ischemic heart disease, unspecified (Multi)     ACS (acute coronary syndrome)    Atherosclerotic heart disease of native coronary artery without angina pectoris     Arteriosclerotic coronary artery disease    Chronic sinusitis, unspecified     Chronic congestion of paranasal sinus    Coronary angioplasty status     History of percutaneous coronary intervention    Encounter for preprocedural cardiovascular examination     Pre-operative cardiovascular examination    Encounter for screening for  malignant neoplasm of prostate     Screening PSA (prostate specific antigen)    Lyme disease, unspecified 12/02/2013    Lyme disease    Other cervical disc displacement, unspecified cervical region     Herniated cervical disc without myelopathy    Other specified postprocedural states     History of carpal tunnel surgery    Other systemic sclerosis (Multi)     Cutaneous scleroderma    Personal history of malignant neoplasm, unspecified     History of malignant neoplasm    Personal history of other diseases of the circulatory system     History of hypertension    Personal history of other diseases of the circulatory system     History of abnormal electrocardiography    Personal history of other diseases of the digestive system     History of gastroesophageal reflux (GERD)    Personal history of other diseases of the musculoskeletal system and connective tissue     History of low back pain    Personal history of other diseases of the musculoskeletal system and connective tissue     History of spinal stenosis    Personal history of other diseases of the musculoskeletal system and connective tissue     History of arthritis    Personal history of other endocrine, nutritional and metabolic disease     History of hypothyroidism    Personal history of other endocrine, nutritional and metabolic disease     History of hyperlipidemia    Personal history of other endocrine, nutritional and metabolic disease     History of type 2 diabetes mellitus    Personal history of other endocrine, nutritional and metabolic disease     History of hypoglycemia    Personal history of other specified conditions     History of chest pain    Polyp of stomach and duodenum     Gastric polyp     Past Surgical History:   Procedure Laterality Date    CORONARY ANGIOPLASTY WITH STENT PLACEMENT  07/27/2018    Cath Placement Of Stent 1    HAND SURGERY  07/27/2018    Hand Surgery                                                                                                                                                           OTHER SURGICAL HISTORY  07/27/2018    Surgery    OTHER SURGICAL HISTORY  07/27/2018    Total Hip Replacement Left       Charting was completed using voice recognition technology and may include unintended errors.     Subjective   Patient ID: Curtis Brown is a 83 y.o. male who presents for the following    Assessment/Plan       HPI      Visit Vitals  /71   Pulse 74   Wt 87.3 kg (192 lb 6.4 oz)   SpO2 94%   BMI 29.25 kg/m²   Smoking Status Never   BSA 2.05 m²     PHYSICAL EXAM       REVIEW OF SYSTEMS       Allergies   Allergen Reactions    Morphine Unknown    Penicillins Unknown    Valacyclovir Rash       Current Outpatient Medications   Medication Sig Dispense Refill    Accu-Chek Pippa Plus test strp strip USE TO TEST ONCE DAILY.      Accu-Chek Fastclix Lancet Drum misc 1 Lancet early in the morning..      amLODIPine (Norvasc) 10 mg tablet Take 1 tablet (10 mg) by mouth once daily. 90 tablet 3    aspirin 81 mg EC tablet Take 1 tablet (81 mg) by mouth once daily.      biotin 1 mg capsule Take by mouth.      cyanocobalamin (Vitamin B-12) 100 mcg tablet Take by mouth.      diphenoxylate-atropine (Lomotil) 2.5-0.025 mg tablet Take 1 tablet by mouth once daily. With additional tablet in evening if needed 30 tablet 2    famotidine (Pepcid) 40 mg tablet Take 1 tablet (40 mg) by mouth 2 times a day. 180 tablet 3    finasteride (Proscar) 5 mg tablet Take 1 tablet (5 mg) by mouth once daily. (Patient taking differently: Take 1.25 mg by mouth once daily.) 90 tablet 3    glimepiride (Amaryl) 1 mg tablet Take by mouth.      glimepiride (Amaryl) 2 mg tablet Take 1 tablet (2 mg) by mouth 2 times a day.      hydroCHLOROthiazide (HYDRODiuril) 25 mg tablet Take 1 tablet (25 mg) by mouth once daily. 90 tablet 3    lactobacillus acidophilus capsule Take 1 capsule by mouth once daily. With breakfast      levothyroxine (Synthroid, Levoxyl) 50 mcg tablet        Livalo 4 mg tablet       losartan (Cozaar) 50 mg tablet Take 1 tablet (50 mg) by mouth once daily.      metoprolol tartrate (Lopressor) 25 mg tablet Take 1 tablet (25 mg) by mouth 2 times a day.      multivitamin capsule Take by mouth.      nitroglycerin (Nitrostat) 0.4 mg SL tablet Place 1 tablet (0.4 mg) under the tongue every 5 minutes if needed for chest pain. Under the tongue as needed for chest pain 25 tablet 2    omega-3s-dha-epa-fish oil 720-1,200 mg capsule,delayed release(DR/EC) Take by mouth.      potassium chloride CR (K-Tab) 20 mEq ER tablet Take by mouth.      primidone (Mysoline) 50 mg tablet Take 1 tablet (50 mg) by mouth once daily. 90 tablet 3    psyllium (Metamucil) powder Take by mouth.      vit C/zinc citrate/elderberry (SAMBUCUS ELDERBERRY ORAL) Take by mouth.      calcium carbonate-vit D3-min 600 mg calcium- 400 unit tablet Take by mouth.      clindamycin (Cleocin) 300 mg capsule Take 1 capsule (300 mg) by mouth. 1 HOUR BEFORE PROCEDURE AND 6 HOURS AFTER PROCEDURE AS DIRECTED      coenzyme Q-10 300 mg capsule capsule Take by mouth.      eye vitamin supplement (Ocuvite Eye Health Formula) capsule Take 1 tablet by mouth once daily.      glucosamine-D3-Boswellia serr (Osteo Bi-Flex, 5-Loxin,) 1,500-400-100 mg-unit-mg tablet Take by mouth.      vit A/vit C/vit E/zinc/copper (PRESERVISION AREDS ORAL) Take by mouth.       No current facility-administered medications for this visit.       Objective     Lab Requisition on 04/29/2024   Component Date Value Ref Range Status    Case Report 04/29/2024    Final                    Value:Medical Cytology Report                           Case: [id number]                                  Authorizing Provider:  Lucita Trotter, Collected:           04/29/2024 Jose ROSARIO                                                                           Ordering Location:     [facility]       Received:             04/29/2024 1931                                     [place]                                                                       Specimen:    RENAL PELVIS FLUID LEFT, Left Renal Kidney lesion and lesion fluid                         Gross Description 04/29/2024    Final                    Value:This result contains rich text formatting which cannot be displayed here.    Specimen Processing Detail 04/29/2024    Final                    Value:This result contains rich text formatting which cannot be displayed here.   Lab on 04/15/2024   Component Date Value Ref Range Status    Amphetamine Screen, Urine 04/15/2024 Presumptive Negative  Presumptive Negative Final    Barbiturate Screen, Urine 04/15/2024 Presumptive Negative  Presumptive Negative Final    Benzodiazepines Screen, Urine 04/15/2024 Presumptive Negative  Presumptive Negative Final    Cannabinoid Screen, Urine 04/15/2024 Presumptive Negative  Presumptive Negative Final    Cocaine Metabolite Screen, Urine 04/15/2024 Presumptive Negative  Presumptive Negative Final    Fentanyl Screen, Urine 04/15/2024 Presumptive Negative  Presumptive Negative Final    Opiate Screen, Urine 04/15/2024 Presumptive Positive (A)  Presumptive Negative Final    Oxycodone Screen, Urine 04/15/2024 Presumptive Negative  Presumptive Negative Final    PCP Screen, Urine 04/15/2024 Presumptive Negative  Presumptive Negative Final    Methadone Screen, Urine 04/15/2024 Presumptive Negative  Presumptive Negative Final    6-Acetylmorphine 04/15/2024 <25  <25 ng/mL Final    Codeine 04/15/2024 <50  <50 ng/mL Final    Hydrocodone 04/15/2024 202 (H)  <25 ng/mL Final    Hydromorphone 04/15/2024 183 (H)  <25 ng/mL Final    Morphine  04/15/2024 <50  <50 ng/mL Final    Norhydrocodone 04/15/2024 262 (H)  <25 ng/mL Final    Noroxycodone 04/15/2024 <25  <25 ng/mL Final    Oxycodone 04/15/2024 <25  <25 ng/mL Final    Oxymorphone 04/15/2024 <25  <25 ng/mL Final   Hospital Outpatient Visit on 04/11/2024    Component Date Value Ref Range Status    AV pk gabriela 04/11/2024 3.93  m/s Final-Edited    AV mn grad 04/11/2024 34.4  mmHg Final-Edited    LVOT diam 04/11/2024 2.03  cm Final-Edited    LV Biplane EF 04/11/2024 47  % Final-Edited    MV E/A ratio 04/11/2024 0.54   Final-Edited    LA vol index A/L 04/11/2024 33.4  ml/m2 Final-Edited    Tricuspid annular plane systolic e* 04/11/2024 1.7  cm Final-Edited    RV free wall pk S' 04/11/2024 1.30  cm/s Final-Edited    LVIDd 04/11/2024 4.62  cm Final-Edited    RVSP 04/11/2024 34.7  mmHg Final-Edited    Aortic Valve Area by Continuity of* 04/11/2024 0.89  cm2 Final-Edited    Aortic Valve Area by Continuity of* 04/11/2024 0.77  cm2 Final-Edited    AV pk grad 04/11/2024 61.6  mmHg Final-Edited    LV A4C EF 04/11/2024 50.1   Final-Edited   Orders Only on 03/07/2024   Component Date Value Ref Range Status    [facility] MDEA- Samantha (cutoff 200 n* 03/07/2024 Not Detected   Final    [facility] Marijuana Metabolite (c* 03/07/2024 Negative   Final    [facility] Tramadol (cutoff 200 ng* 03/07/2024 Negative   Final    [facility] Norbuprenorphine (cutof* 03/07/2024 Not Detected   Final    [facility] Oxymorphone (cutoff 40 * 03/07/2024 Not Detected   Final    [facility] Oxazepam (cutoff 50 ng/* 03/07/2024 Not Detected   Final    [facility] Alpha-OH-Alprazolam (cu* 03/07/2024 Not Detected   Final    [facility] Creatinine, Urine 03/07/2024 144.4  20.0 - 400.0 mg/dL Final    [facility] MDMA- Ecstasy (cutoff 2* 03/07/2024 Not Detected   Final    [facility] Tapentadol-o-Sulf (cuto* 03/07/2024 Not Detected   Final    [facility] Buprenorphine (cutoff 5* 03/07/2024 Not Detected   Final    [facility] Oxycodone (cutoff 40 ng* 03/07/2024 Not Detected   Final    [facility] Pregabalin (cutoff 100 * 03/07/2024 Not Detected   Final    [facility] Benzoylecgonine (cutoff* 03/07/2024 Negative   Final    [facility]* 03/07/2024 See Below   Final    [facility] Zolpidem Metabolite (cu*  03/07/2024 Not Detected   Final    [facility] Meperidine metabolite (* 03/07/2024 Not Detected   Final    [facility] Norhydrocodone  (cutoff* 03/07/2024 Not Detected   Final    [facility] Morphine (cutoff 20 ng/* 03/07/2024 Not Detected   Final    [facility] Midazolam (cutoff 20 ng* 03/07/2024 Not Detected   Final    [facility] Diazepam (cutoff 50 ng/* 03/07/2024 Not Detected   Final    [facility] Methylphenidate (cutoff* 03/07/2024 Not Detected   Final    [facility] PCP (cutoff 25 ng/mL) 03/07/2024 Negative   Final    [facility] Alpha-OH-Midazolam (cut* 03/07/2024 Not Detected   Final    [facility] Amphetamine (cutoff 50 * 03/07/2024 Not Detected   Final    [facility] Fentanyl (cutoff 2 ng/m* 03/07/2024 Not Detected   Final    [facility] Noroxymorphone  (cutoff* 03/07/2024 Not Detected   Final    [facility] 03/07/2024 See Note   Final    [facility] Temazepam (cutoff 50 ng* 03/07/2024 Not Detected   Final    [facility] Clonazepam (cutoff 20 n* 03/07/2024 Not Detected   Final    [facility] MDA (cutoff 200 ng/mL) 03/07/2024 Not Detected   Final    [facility] Ethyl Glucuronide (cuto* 03/07/2024 Negative   Final    [facility] Methadone (cutoff 150 n* 03/07/2024 Negative   Final    [facility] Naloxone (cutoff 100 ng* 03/07/2024 Not Detected   Final    [facility] Noroxycodone  (cutoff 1* 03/07/2024 Not Detected   Final    [facility] Nordiazepam (cutoff 50 * 03/07/2024 Not Detected   Final    [facility] Alprazolam (cutoff 40 n* 03/07/2024 Not Detected   Final    [facility] EER Pain Mgt [place], [place] * 03/07/2024 See Note   Final    [facility] Barbiturates (cutoff 20* 03/07/2024 Negative   Final    [facility] Tapentadol (cutoff 100 * 03/07/2024 Not Detected   Final    [facility] Hydromorphone (cutoff 2* 03/07/2024 Not Detected   Final    [facility] 6-acetylmorphine (cutof* 03/07/2024 Not Detected   Final    [facility] Gabapentin (cutoff 100 * 03/07/2024 Not Detected   Final    [facility] Phentermine  (cutoff 100* 03/07/2024 Not Detected   Final    [facility] Carisoprodol (cut-off 1* 03/07/2024 Negative   Final    [facility] Methamphetamine (cutoff* 03/07/2024 Not Detected   Final    [facility] Zolpidem (cutoff 20 ng/* 03/07/2024 Not Detected   Final    [facility] Norfentanyl (cutoff 2 n* 03/07/2024 Not Detected   Final    [facility] Hydrocodone (cutoff 40 * 03/07/2024 Not Detected   Final    [facility] Codeine (cutoff 40 ng/m* 03/07/2024 Not Detected   Final    [facility] Lorazepam (cutoff 60 ng* 03/07/2024 Not Detected   Final    [facility] 7-Aminoclonazepam (cuto* 03/07/2024 Not Detected   Final       Radiology: Reviewed imaging in powerchart.  No results found.    Family History   Problem Relation Name Age of Onset    Stroke Mother      Heart failure Mother      Other (hepatic cirrhosis) Father      Parkinsonism Brother       Social History     Socioeconomic History    Marital status:      Spouse name: None    Number of children: None    Years of education: None    Highest education level: None   Occupational History    None   Tobacco Use    Smoking status: Never    Smokeless tobacco: Never   Substance and Sexual Activity    Alcohol use: Never    Drug use: Never    Sexual activity: None   Other Topics Concern    None   Social History Narrative    None     Social Determinants of Health     Financial Resource Strain: Not on file   Food Insecurity: Not on file   Transportation Needs: Not on file   Physical Activity: Not on file   Stress: Not on file   Social Connections: Not on file   Intimate Partner Violence: Not on file   Housing Stability: Not on file     Past Medical History:   Diagnosis Date    Abnormal result of other cardiovascular function study     Abnormal nuclear stress test    Acute ischemic heart disease, unspecified (Multi)     ACS (acute coronary syndrome)    Atherosclerotic heart disease of native coronary artery without angina pectoris     Arteriosclerotic coronary artery disease     Chronic sinusitis, unspecified     Chronic congestion of paranasal sinus    Coronary angioplasty status     History of percutaneous coronary intervention    Encounter for preprocedural cardiovascular examination     Pre-operative cardiovascular examination    Encounter for screening for malignant neoplasm of prostate     Screening PSA (prostate specific antigen)    Lyme disease, unspecified 12/02/2013    Lyme disease    Other cervical disc displacement, unspecified cervical region     Herniated cervical disc without myelopathy    Other specified postprocedural states     History of carpal tunnel surgery    Other systemic sclerosis (Multi)     Cutaneous scleroderma    Personal history of malignant neoplasm, unspecified     History of malignant neoplasm    Personal history of other diseases of the circulatory system     History of hypertension    Personal history of other diseases of the circulatory system     History of abnormal electrocardiography    Personal history of other diseases of the digestive system     History of gastroesophageal reflux (GERD)    Personal history of other diseases of the musculoskeletal system and connective tissue     History of low back pain    Personal history of other diseases of the musculoskeletal system and connective tissue     History of spinal stenosis    Personal history of other diseases of the musculoskeletal system and connective tissue     History of arthritis    Personal history of other endocrine, nutritional and metabolic disease     History of hypothyroidism    Personal history of other endocrine, nutritional and metabolic disease     History of hyperlipidemia    Personal history of other endocrine, nutritional and metabolic disease     History of type 2 diabetes mellitus    Personal history of other endocrine, nutritional and metabolic disease     History of hypoglycemia    Personal history of other specified conditions     History of chest pain    Polyp of stomach and  duodenum     Gastric polyp     Past Surgical History:   Procedure Laterality Date    CORONARY ANGIOPLASTY WITH STENT PLACEMENT  07/27/2018    Cath Placement Of Stent 1    HAND SURGERY  07/27/2018    Hand Surgery                                                                                                                                                          OTHER SURGICAL HISTORY  07/27/2018    Surgery    OTHER SURGICAL HISTORY  07/27/2018    Total Hip Replacement Left       Charting was completed using voice recognition technology and may include unintended errors.        Statement Selected

## 2024-06-04 ENCOUNTER — APPOINTMENT (OUTPATIENT)
Dept: PRIMARY CARE | Facility: CLINIC | Age: 84
End: 2024-06-04
Payer: MEDICARE

## 2024-06-06 ENCOUNTER — TELEPHONE (OUTPATIENT)
Dept: PRIMARY CARE | Facility: CLINIC | Age: 84
End: 2024-06-06
Payer: MEDICARE

## 2024-06-06 NOTE — TELEPHONE ENCOUNTER
----- Message from Baudilio Gonzalez DO sent at 6/4/2024  1:15 PM EDT -----  Patient needs to do a urine pain panel as his medication did not show up in his urine

## 2024-06-29 DIAGNOSIS — I10 PRIMARY HYPERTENSION: ICD-10-CM

## 2024-07-02 RX ORDER — METOPROLOL TARTRATE 25 MG/1
25 TABLET, FILM COATED ORAL 2 TIMES DAILY
Qty: 180 TABLET | Refills: 3 | Status: SHIPPED | OUTPATIENT
Start: 2024-07-02

## 2024-07-15 ENCOUNTER — APPOINTMENT (OUTPATIENT)
Dept: PRIMARY CARE | Facility: CLINIC | Age: 84
End: 2024-07-15
Payer: MEDICARE

## 2024-07-18 ENCOUNTER — TELEPHONE (OUTPATIENT)
Dept: PRIMARY CARE | Facility: CLINIC | Age: 84
End: 2024-07-18
Payer: MEDICARE

## 2024-07-19 ENCOUNTER — OFFICE VISIT (OUTPATIENT)
Dept: PRIMARY CARE | Facility: CLINIC | Age: 84
End: 2024-07-19
Payer: MEDICARE

## 2024-07-19 VITALS
HEIGHT: 68 IN | HEART RATE: 76 BPM | WEIGHT: 188 LBS | DIASTOLIC BLOOD PRESSURE: 82 MMHG | BODY MASS INDEX: 28.49 KG/M2 | SYSTOLIC BLOOD PRESSURE: 145 MMHG

## 2024-07-19 DIAGNOSIS — E03.9 HYPOTHYROIDISM, UNSPECIFIED TYPE: ICD-10-CM

## 2024-07-19 DIAGNOSIS — R10.9 LEFT FLANK PAIN: ICD-10-CM

## 2024-07-19 DIAGNOSIS — R52 UNCONTROLLED PAIN: Primary | ICD-10-CM

## 2024-07-19 DIAGNOSIS — F41.9 ANXIETY DISORDER, UNSPECIFIED TYPE: ICD-10-CM

## 2024-07-19 PROCEDURE — 1036F TOBACCO NON-USER: CPT | Performed by: EMERGENCY MEDICINE

## 2024-07-19 PROCEDURE — 99214 OFFICE O/P EST MOD 30 MIN: CPT | Performed by: EMERGENCY MEDICINE

## 2024-07-19 PROCEDURE — 3079F DIAST BP 80-89 MM HG: CPT | Performed by: EMERGENCY MEDICINE

## 2024-07-19 PROCEDURE — 3077F SYST BP >= 140 MM HG: CPT | Performed by: EMERGENCY MEDICINE

## 2024-07-19 RX ORDER — HYDROCODONE BITARTRATE AND ACETAMINOPHEN 5; 325 MG/1; MG/1
2 TABLET ORAL EVERY 12 HOURS PRN
COMMUNITY
End: 2024-07-19 | Stop reason: SDUPTHER

## 2024-07-19 RX ORDER — HYDROCODONE BITARTRATE AND ACETAMINOPHEN 5; 325 MG/1; MG/1
2 TABLET ORAL EVERY 12 HOURS PRN
Qty: 20 TABLET | Refills: 0 | Status: SHIPPED | OUTPATIENT
Start: 2024-07-19

## 2024-07-19 NOTE — PROGRESS NOTES
Subjective   Patient ID: Curtis Brown is a 83 y.o. male who presents for the following     Controlled refill (lomotil) 3/4/2024  and chronic care follow up    MEDICARE WELLNESS 10/25/2023   Assessment/Plan     Back Pain - Patient has severe lower back pain radiating to both knees. He has been on Hydrocodone regularly and required a refill. Prescription was renewed for 1 month supply.    I discussed patient's OARRS report as well as the potential concern for overdose and dependence on prescribed opioid or sleep aid or gabapentin/lyrica, or benzodiazepines.    Patient understands that the risk of death can occur when using opoids, benzodiazepines, gabapentin, lyrica, sleep aids, and illegal drugs. The risk of death especially increases when using the above medications and or illegal drugs in combination. I have reviewed with the patient and the patient is in agreement with the terms of the  Controlled Substance Agreement.    At this point in time, patient is in compliance with the above, and has no signs of dependence or addiction to the above prescribed medications.          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   Following with urology getting repeat ultrasound May 14, 2025    Hx spinal stenosis following with dr thorpe and dr jones soon   Patient may not require hydrocodone after he has a steroid injection to spine.   Urine drug screen 5/29/2024 ordered  Hydrocodone 5-325mg bid #120  Controlled substance agreement signed 4/15/2024     lymphocytic colitis diarrhea: stable on diphenoxylate  #30    Not due yet    Hx SEPSIS ACUTE PYELONEPHROSIS 10/2023: resolved at this time    Slight anemia hgb trending 11-12s lately but 1 year ago his hgb was 15. Iron/b12/folte wnl.     Resting tremor: started primidone 25mg daily     PSA elevated 6s (October 2023, not normal)  PSA wnl now will  follow up to urology     pericardial effusion: Patient no longer on colchicine and has follow up with  dr montes     Hypothyroid: tsh wnl      htn: stable, continue metoprolol.  improved, continue on   hctz to 25mg daily ,   amlodipine 10mg daily.   Losartan 50mg daily      hld: stable, continue statin. LDL < 70. Patient wants to change Lipitor to Crestor per patient's request. (Patient went to Dr Rodrigues and will get pitavastatin)     Dry macular degeneration: stable following with Dr Andrew @ [facility]     dm2: stable, 6s    Patient follows with dr rodrigues   5 mg tablet Amaryl daily . Watch for low sugars.      PVD: stable, continue statin and aspirin      diastolic CHF and severe aortic stenosis: stable, patient is very active. stable. getting echo with dr montes twice a year      BPH: patient is taking finasteride. He denies any complaints and he needs a refill       patient does not have children. needs to consider POA outs       Colonoscopy may 2021 with dr jenkins           hpi  male cad s/p stent 11/2013, PVD, htn, hld comes for the following    Patient was hospitalized at [facility] from April 5, 2024 to April 7, 2024 as he had left-sided flank pain that radiated down to his left groin into his testicles.  He says this woke him up out of his early sleep he decided come emergency department for further assessment.  CT scan of the abdomen pelvis shows a left renal mass which is a known mass.  However there is some mild perinephric stranding as well.  Urinalysis was negative for infection labs were unremarkable.  His pain was much better controlled he was seen by urology who did not see any evidence of urological cause.  Heme-onc saw the patient and agreed about the left renal mass being a longstanding issue without any further intervention.  On going pain with his left flank and groin at home. He is woken up in the middle of the night and he is going to get a biopsy of the left kidney mass. He would like to have some pain killers. He says he had an old hydrocodone at home and it was quite helpful to get  back to sleep but he does not have any medications.        HX PERICARDITIS AND PERICARDIAL EFFUSION sEPTEMBER 2022: this has since resolved. and he follows with cardiology regularly for this     Resting tremors: worsening in his hands and he is UNABLE TO  hold objects in his hands.     lymphocytic colitis (biopsy + May 18, 2021)diarrhea: patient is on diphenoxylate/atropine for his diarrhea on a regular basis. he was seen by Dr De La Paz (who has just recently retired). he is controlled on dipheoxylate-atropine. he does on occasion during the month require an additional dose. patient continues to do well on his current management      diastolic chf: Patient denies any chest pain, angina or exertional dyspnea. patient denies any swelling to lower extremities. Patient follows with cardiology on a regular basis     echo from feb 2020 ef 60% with moderate to severe aortic valve stenosis and mild tr.      Diabetes Type 2: patient denies any low blood sugars. Patient is following with opthalmology on a yearly basis. Patient is taking glimepiride a1c 5s typically. patient following with dr christina.      hypothyroid: patient denies any hypo or hypothyroid symptoms. patient denies any palpitations, diarrhea or constipation     HLD: Patient denies any muscle aches and is tolerating statin therapy     pvd: denies any previous strokes, no headaches. no claudication     spinal stenosis: pain intermittent, in gluteal region     social: patient denies any smoking, drug or alcohol abuse     surgical history: left hip replacement 11/2017     [facility] MAY 2021 [facility] with 5 year follow up      HOSPITALIZATIONS  @[facility] from October 30, 2023 to November 3, 2023 patient's was hospitalized for subjective fevers and sweats.  He was found to have a UTI and left pyelonephritis.  Patient was treated for E. coli growing in his urine sensitive to most antibiotics except ampicillin and Unasyn.  Patient was  recommended to continue on Cipro 500 mg twice a day for an additional 10 days posthospitalization.  He is noted to have a left solid renal cyst.  An MRI of the abdomen was done showing pyelonephritis but also a 1.6 cm left renal lesion which is characteristic of a hemorrhagic cyst.  Patient's losartan was still on hold upon discharge.        September 3 to September 7, 2022 @[facility] . Patient came in originally to [facility] for shortness of breath and chest pain. Patient was found to have acute pericarditis along with moderate-sized pericardial effusion. He was placed on colchicine twice a day and his symptoms dramatically improved. Cardiology as well as endocrinology was following him and he was discharged on colchicine.      Last Urine Drug Screen / ordered today: Yes  Recent Results (from the past 8760 hour(s))   Drug Screen, Urine With Reflex to Confirmation    Collection Time: 05/29/24  3:20 PM   Result Value Ref Range    Amphetamine Screen, Urine Presumptive Negative Presumptive Negative    Barbiturate Screen, Urine Presumptive Negative Presumptive Negative    Benzodiazepines Screen, Urine Presumptive Negative Presumptive Negative    Cannabinoid Screen, Urine Presumptive Negative Presumptive Negative    Cocaine Metabolite Screen, Urine Presumptive Negative Presumptive Negative    Fentanyl Screen, Urine Presumptive Negative Presumptive Negative    Opiate Screen, Urine Presumptive Negative Presumptive Negative    Oxycodone Screen, Urine Presumptive Negative Presumptive Negative    PCP Screen, Urine Presumptive Negative Presumptive Negative    Methadone Screen, Urine Presumptive Negative Presumptive Negative   Opiate Confirmation, Urine    Collection Time: 04/15/24  9:53 AM   Result Value Ref Range    6-Acetylmorphine <25 <25 ng/mL    Codeine <50 <50 ng/mL    Hydrocodone 202 (H) <25 ng/mL    Hydromorphone 183 (H) <25 ng/mL    Morphine  <50 <50 ng/mL    Norhydrocodone 262 (H) <25 ng/mL     "Noroxycodone <25 <25 ng/mL    Oxycodone <25 <25 ng/mL    Oxymorphone <25 <25 ng/mL       Results are as expected.          Antidiarrheal:   What is the patient's goal of therapy?  Improve diarrhea   Is this being achieved with current treatment? yes  Is the patient currently prescribed an opioid, and/or benzodiazepine?   Yes, I feel it is clincially indicated to continue the medication and have discussed with the patient risks/benefits/alternatives.    Activities of Daily Living:   Is your overall impression that this patient is benefiting (symptom reduction outweighs side effects) from antidiarrheal therapy? No     1. Physical Functioning: Better  2. Family Relationship: Better  3. Social Relationship: Better  4. Mood: Better  5. Sleep Patterns: Better  6. Overall Function: Better      Visit Vitals  /82   Pulse 76   Ht 1.727 m (5' 8\")   Wt 85.3 kg (188 lb)   BMI 28.59 kg/m²   Smoking Status Never   BSA 2.02 m²     PHYSICAL EXAM   General appearance: Alert and in no acute distress  HEENT: Normal Inspection.  Neck: Normal Inspectiopn  Respiratory: No respiratory distress.   Cardiovascular: heart rate normal. No gallop  Back: normal inspection  Skin inspection: Warm  Musculoskeletal: No deformities  Neuro: Grossly Intact  Psychiatric: Cooperative      REVIEW OF SYSTEMS     Comprehensive review of symptoms was not positive for any symptoms other than HPI.       Allergies   Allergen Reactions    Morphine Unknown    Penicillins Unknown    Valacyclovir Rash       Current Outpatient Medications   Medication Sig Dispense Refill    Accu-Chek Pippa Plus test strp strip USE TO TEST ONCE DAILY.      Accu-Chek Fastclix Lancet Drum misc 1 Lancet early in the morning..      amLODIPine (Norvasc) 10 mg tablet Take 1 tablet (10 mg) by mouth once daily. 90 tablet 3    aspirin 81 mg EC tablet Take 1 tablet (81 mg) by mouth once daily.      biotin 1 mg capsule Take by mouth.      calcium carbonate-vit D3-min 600 mg calcium- 400 " unit tablet Take by mouth.      clindamycin (Cleocin) 300 mg capsule Take 1 capsule (300 mg) by mouth. 1 HOUR BEFORE PROCEDURE AND 6 HOURS AFTER PROCEDURE AS DIRECTED      coenzyme Q-10 300 mg capsule capsule Take by mouth.      cyanocobalamin (Vitamin B-12) 100 mcg tablet Take by mouth.      diphenoxylate-atropine (Lomotil) 2.5-0.025 mg tablet Take 1 tablet by mouth once daily. With additional tablet in evening if needed 30 tablet 2    eye vitamin supplement (Ocuvite Eye Health Formula) capsule Take 1 tablet by mouth once daily.      famotidine (Pepcid) 40 mg tablet Take 1 tablet (40 mg) by mouth 2 times a day. 180 tablet 3    finasteride (Proscar) 5 mg tablet Take 1 tablet (5 mg) by mouth once daily. (Patient taking differently: Take 1.25 mg by mouth once daily.) 90 tablet 3    glimepiride (Amaryl) 1 mg tablet Take by mouth.      glimepiride (Amaryl) 2 mg tablet Take 1 tablet (2 mg) by mouth 2 times a day.      glucosamine-D3-Boswellia serr (Osteo Bi-Flex, 5-Loxin,) 1,500-400-100 mg-unit-mg tablet Take by mouth.      hydroCHLOROthiazide (HYDRODiuril) 25 mg tablet Take 1 tablet (25 mg) by mouth once daily. 90 tablet 3    HYDROcodone-acetaminophen (Norco) 5-325 mg tablet Take 2 tablets by mouth every 12 hours if needed for severe pain (7 - 10).      lactobacillus acidophilus capsule Take 1 capsule by mouth once daily. With breakfast      levothyroxine (Synthroid, Levoxyl) 50 mcg tablet       Livalo 4 mg tablet       losartan (Cozaar) 50 mg tablet Take 1 tablet (50 mg) by mouth once daily.      metoprolol tartrate (Lopressor) 25 mg tablet TAKE 1 TABLET BY MOUTH TWICE  DAILY 180 tablet 3    multivitamin capsule Take by mouth.      nitroglycerin (Nitrostat) 0.4 mg SL tablet Place 1 tablet (0.4 mg) under the tongue every 5 minutes if needed for chest pain. Under the tongue as needed for chest pain 25 tablet 2    omega-3s-dha-epa-fish oil 720-1,200 mg capsule,delayed release(DR/EC) Take by mouth.      potassium chloride  CR (K-Tab) 20 mEq ER tablet Take by mouth.      primidone (Mysoline) 50 mg tablet Take 1 tablet (50 mg) by mouth once daily. 90 tablet 3    psyllium (Metamucil) powder Take by mouth.      vit A/vit C/vit E/zinc/copper (PRESERVISION AREDS ORAL) Take by mouth.      vit C/zinc citrate/elderberry (SAMBUCUS ELDERBERRY ORAL) Take by mouth.       No current facility-administered medications for this visit.       Objective     Lab on 05/29/2024   Component Date Value Ref Range Status    Amphetamine Screen, Urine 05/29/2024 Presumptive Negative  Presumptive Negative Final    Barbiturate Screen, Urine 05/29/2024 Presumptive Negative  Presumptive Negative Final    Benzodiazepines Screen, Urine 05/29/2024 Presumptive Negative  Presumptive Negative Final    Cannabinoid Screen, Urine 05/29/2024 Presumptive Negative  Presumptive Negative Final    Cocaine Metabolite Screen, Urine 05/29/2024 Presumptive Negative  Presumptive Negative Final    Fentanyl Screen, Urine 05/29/2024 Presumptive Negative  Presumptive Negative Final    Opiate Screen, Urine 05/29/2024 Presumptive Negative  Presumptive Negative Final    Oxycodone Screen, Urine 05/29/2024 Presumptive Negative  Presumptive Negative Final    PCP Screen, Urine 05/29/2024 Presumptive Negative  Presumptive Negative Final    Methadone Screen, Urine 05/29/2024 Presumptive Negative  Presumptive Negative Final   Lab Requisition on 04/29/2024   Component Date Value Ref Range Status    Case Report 04/29/2024    Final                    Value:Medical Cytology Report                           Case: [id number]                                  Authorizing Provider:  Lucita Trotter, Collected:           04/29/2024 1200                                     MD                                                                           Ordering Location:     [facility]       Received:            04/29/2024 [address] [place] [address]                                                                        Specimen:    RENAL PELVIS FLUID LEFT, Left Renal Kidney lesion and lesion fluid                         Gross Description 04/29/2024    Final                    Value:This result contains rich text formatting which cannot be displayed here.    Specimen Processing Detail 04/29/2024    Final                    Value:This result contains rich text formatting which cannot be displayed here.   Lab on 04/15/2024   Component Date Value Ref Range Status    Amphetamine Screen, Urine 04/15/2024 Presumptive Negative  Presumptive Negative Final    Barbiturate Screen, Urine 04/15/2024 Presumptive Negative  Presumptive Negative Final    Benzodiazepines Screen, Urine 04/15/2024 Presumptive Negative  Presumptive Negative Final    Cannabinoid Screen, Urine 04/15/2024 Presumptive Negative  Presumptive Negative Final    Cocaine Metabolite Screen, Urine 04/15/2024 Presumptive Negative  Presumptive Negative Final    Fentanyl Screen, Urine 04/15/2024 Presumptive Negative  Presumptive Negative Final    Opiate Screen, Urine 04/15/2024 Presumptive Positive (A)  Presumptive Negative Final    Oxycodone Screen, Urine 04/15/2024 Presumptive Negative  Presumptive Negative Final    PCP Screen, Urine 04/15/2024 Presumptive Negative  Presumptive Negative Final    Methadone Screen, Urine 04/15/2024 Presumptive Negative  Presumptive Negative Final    6-Acetylmorphine 04/15/2024 <25  <25 ng/mL Final    Codeine 04/15/2024 <50  <50 ng/mL Final    Hydrocodone 04/15/2024 202 (H)  <25 ng/mL Final    Hydromorphone 04/15/2024 183 (H)  <25 ng/mL Final    Morphine  04/15/2024 <50  <50 ng/mL Final    Norhydrocodone 04/15/2024 262 (H)  <25 ng/mL Final    Noroxycodone 04/15/2024 <25  <25 ng/mL Final    Oxycodone 04/15/2024 <25  <25 ng/mL Final    Oxymorphone 04/15/2024 <25  <25 ng/mL Final   Hospital Outpatient Visit on 04/11/2024   Component Date Value Ref Range Status    AV pk gabriela 04/11/2024 3.93  m/s Final-Edited     AV mn grad 04/11/2024 34.4  mmHg Final-Edited    LVOT diam 04/11/2024 2.03  cm Final-Edited    LV Biplane EF 04/11/2024 47  % Final-Edited    MV E/A ratio 04/11/2024 0.54   Final-Edited    LA vol index A/L 04/11/2024 33.4  ml/m2 Final-Edited    Tricuspid annular plane systolic e* 04/11/2024 1.7  cm Final-Edited    RV free wall pk S' 04/11/2024 1.30  cm/s Final-Edited    LVIDd 04/11/2024 4.62  cm Final-Edited    RVSP 04/11/2024 34.7  mmHg Final-Edited    Aortic Valve Area by Continuity of* 04/11/2024 0.89  cm2 Final-Edited    Aortic Valve Area by Continuity of* 04/11/2024 0.77  cm2 Final-Edited    AV pk grad 04/11/2024 61.6  mmHg Final-Edited    LV A4C EF 04/11/2024 50.1   Final-Edited       Radiology: Reviewed imaging in powerchart.  No results found.    Family History   Problem Relation Name Age of Onset    Stroke Mother      Heart failure Mother      Other (hepatic cirrhosis) Father      Parkinsonism Brother       Social History     Socioeconomic History    Marital status:    Tobacco Use    Smoking status: Never    Smokeless tobacco: Never   Substance and Sexual Activity    Alcohol use: Never    Drug use: Never     Past Medical History:   Diagnosis Date    Abnormal result of other cardiovascular function study     Abnormal nuclear stress test    Acute ischemic heart disease, unspecified (Multi)     ACS (acute coronary syndrome)    Atherosclerotic heart disease of native coronary artery without angina pectoris     Arteriosclerotic coronary artery disease    Chronic sinusitis, unspecified     Chronic congestion of paranasal sinus    Coronary angioplasty status     History of percutaneous coronary intervention    Encounter for preprocedural cardiovascular examination     Pre-operative cardiovascular examination    Encounter for screening for malignant neoplasm of prostate     Screening PSA (prostate specific antigen)    Lyme disease, unspecified 12/02/2013    Lyme disease    Other cervical disc displacement,  unspecified cervical region     Herniated cervical disc without myelopathy    Other specified postprocedural states     History of carpal tunnel surgery    Other systemic sclerosis (Multi)     Cutaneous scleroderma    Personal history of malignant neoplasm, unspecified     History of malignant neoplasm    Personal history of other diseases of the circulatory system     History of hypertension    Personal history of other diseases of the circulatory system     History of abnormal electrocardiography    Personal history of other diseases of the digestive system     History of gastroesophageal reflux (GERD)    Personal history of other diseases of the musculoskeletal system and connective tissue     History of low back pain    Personal history of other diseases of the musculoskeletal system and connective tissue     History of spinal stenosis    Personal history of other diseases of the musculoskeletal system and connective tissue     History of arthritis    Personal history of other endocrine, nutritional and metabolic disease     History of hypothyroidism    Personal history of other endocrine, nutritional and metabolic disease     History of hyperlipidemia    Personal history of other endocrine, nutritional and metabolic disease     History of type 2 diabetes mellitus    Personal history of other endocrine, nutritional and metabolic disease     History of hypoglycemia    Personal history of other specified conditions     History of chest pain    Polyp of stomach and duodenum     Gastric polyp     Past Surgical History:   Procedure Laterality Date    CORONARY ANGIOPLASTY WITH STENT PLACEMENT  07/27/2018    Cath Placement Of Stent 1    HAND SURGERY  07/27/2018    Hand Surgery                                                                                                                                                          OTHER SURGICAL HISTORY  07/27/2018    Surgery    OTHER SURGICAL HISTORY  07/27/2018    Total  Hip Replacement Left       Charting was completed using voice recognition technology and may include unintended errors.

## 2024-07-26 LAB
NON-UH HIE A/G RATIO: 1.4
NON-UH HIE ALB: 4.2 G/DL (ref 3.4–5)
NON-UH HIE ALK PHOS: 39 UNIT/L (ref 45–117)
NON-UH HIE BILIRUBIN, TOTAL: 0.9 MG/DL (ref 0.3–1.2)
NON-UH HIE BUN/CREAT RATIO: 16.4
NON-UH HIE BUN: 18 MG/DL (ref 9–23)
NON-UH HIE CALCIUM: 9.9 MG/DL (ref 8.7–10.4)
NON-UH HIE CALCULATED LDL CHOLESTEROL: 28 MG/DL (ref 60–130)
NON-UH HIE CALCULATED OSMOLALITY: 286 MOSM/KG (ref 275–295)
NON-UH HIE CHLORIDE: 105 MMOL/L (ref 98–107)
NON-UH HIE CHOLESTEROL: 145 MG/DL (ref 100–200)
NON-UH HIE CO2, VENOUS: 28 MMOL/L (ref 20–31)
NON-UH HIE CREATININE: 1.1 MG/DL (ref 0.6–1.1)
NON-UH HIE GFR AA: >60
NON-UH HIE GLOBULIN: 2.9 G/DL
NON-UH HIE GLOMERULAR FILTRATION RATE: >60 ML/MIN/1.73M?
NON-UH HIE GLUCOSE: 173 MG/DL (ref 74–106)
NON-UH HIE GOT: 21 UNIT/L (ref 15–37)
NON-UH HIE GPT: 31 UNIT/L (ref 10–49)
NON-UH HIE HCT: 41.2 % (ref 41–52)
NON-UH HIE HDL CHOLESTEROL: 38 MG/DL (ref 40–60)
NON-UH HIE HGB A1C: 5.9 %
NON-UH HIE HGB: 14 G/DL (ref 13.5–17.5)
NON-UH HIE INSTR WBC ND: 7.5
NON-UH HIE K: 3.6 MMOL/L (ref 3.5–5.1)
NON-UH HIE MCH: 32.8 PG (ref 27–34)
NON-UH HIE MCHC: 34.1 G/DL (ref 32–37)
NON-UH HIE MCV: 96.1 FL (ref 80–100)
NON-UH HIE MPV: 7.4 FL (ref 7.4–10.4)
NON-UH HIE NA: 140 MMOL/L (ref 135–145)
NON-UH HIE PLATELET: 180 X10 (ref 150–450)
NON-UH HIE RBC: 4.28 X10 (ref 4.7–6.1)
NON-UH HIE RDW: 13.5 % (ref 11.5–14.5)
NON-UH HIE TOTAL CHOL/HDL CHOL RATIO: 3.8
NON-UH HIE TOTAL PROTEIN: 7.1 G/DL (ref 5.7–8.2)
NON-UH HIE TRIGLYCERIDES: 393 MG/DL (ref 30–150)
NON-UH HIE TSH: 1.91 UIU/ML (ref 0.55–4.78)
NON-UH HIE WBC: 7.5 X10 (ref 4.5–11)

## 2024-07-30 DIAGNOSIS — E78.1 HYPERTRIGLYCERIDEMIA: Primary | ICD-10-CM

## 2024-07-30 RX ORDER — FENOFIBRATE 48 MG/1
48 TABLET, FILM COATED ORAL DAILY
Qty: 90 TABLET | Refills: 3 | Status: SHIPPED | OUTPATIENT
Start: 2024-07-30 | End: 2025-07-25

## 2024-07-31 NOTE — TELEPHONE ENCOUNTER
----- Message from Baudilio Gonzalez sent at 7/30/2024 11:01 AM EDT -----  I called in tricor to help with patients triglycerides. I called into mail order. Follow up In 3- 4 months for fasting blood work

## 2024-08-07 ENCOUNTER — OFFICE VISIT (OUTPATIENT)
Dept: PRIMARY CARE | Facility: CLINIC | Age: 84
End: 2024-08-07
Payer: MEDICARE

## 2024-08-07 ENCOUNTER — LAB (OUTPATIENT)
Dept: LAB | Facility: LAB | Age: 84
End: 2024-08-07
Payer: MEDICARE

## 2024-08-07 VITALS
HEART RATE: 78 BPM | WEIGHT: 190 LBS | HEIGHT: 68 IN | OXYGEN SATURATION: 96 % | DIASTOLIC BLOOD PRESSURE: 72 MMHG | BODY MASS INDEX: 28.79 KG/M2 | SYSTOLIC BLOOD PRESSURE: 134 MMHG

## 2024-08-07 DIAGNOSIS — M48.00 SPINAL STENOSIS, UNSPECIFIED SPINAL REGION: Primary | ICD-10-CM

## 2024-08-07 DIAGNOSIS — Z79.899 MEDICATION MANAGEMENT: ICD-10-CM

## 2024-08-07 DIAGNOSIS — R52 UNCONTROLLED PAIN: ICD-10-CM

## 2024-08-07 DIAGNOSIS — E78.1 HYPERTRIGLYCERIDEMIA: ICD-10-CM

## 2024-08-07 PROCEDURE — 3075F SYST BP GE 130 - 139MM HG: CPT | Performed by: INTERNAL MEDICINE

## 2024-08-07 PROCEDURE — 99214 OFFICE O/P EST MOD 30 MIN: CPT | Performed by: INTERNAL MEDICINE

## 2024-08-07 PROCEDURE — 3078F DIAST BP <80 MM HG: CPT | Performed by: INTERNAL MEDICINE

## 2024-08-07 PROCEDURE — 80307 DRUG TEST PRSMV CHEM ANLYZR: CPT

## 2024-08-07 PROCEDURE — 1159F MED LIST DOCD IN RCRD: CPT | Performed by: INTERNAL MEDICINE

## 2024-08-07 RX ORDER — HYDROCODONE BITARTRATE AND ACETAMINOPHEN 5; 325 MG/1; MG/1
1 TABLET ORAL EVERY 4 HOURS PRN
Qty: 120 TABLET | Refills: 0 | Status: SHIPPED | OUTPATIENT
Start: 2024-08-07 | End: 2024-09-06

## 2024-08-07 RX ORDER — FENOFIBRATE 48 MG/1
48 TABLET, FILM COATED ORAL DAILY
Qty: 90 TABLET | Refills: 2 | Status: SHIPPED | OUTPATIENT
Start: 2024-08-07 | End: 2025-08-02

## 2024-08-07 ASSESSMENT — ENCOUNTER SYMPTOMS: PAIN: 1

## 2024-08-07 NOTE — PROGRESS NOTES
Subjective   Patient ID: Curtis Brown is a 83 y.o. 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   Following with urology Dr Saldaña. Surveillance only at this time.     Hx spinal stenosis following with dr thorpe and dr robert ghosh   Patient may not require hydrocodone after he has a steroid injection to spine.   Urine drug screen 5/29/2024 ordered  Hydrocodone 5-325mg bid #120  Controlled substance agreement signed 4/15/2024     lymphocytic colitis diarrhea: stable on diphenoxylate  #30    Not due yet    Hx SEPSIS ACUTE PYELONEPHROSIS 10/2023: resolved at this time    Slight anemia hgb trending 11-12s lately but 1 year ago his hgb was 15. Iron/b12/folte wnl.     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  improved, continue on   hctz to 25mg daily ,   amlodipine 10mg daily.   Losartan 50mg daily      hld: stable, continue statin. LDL < 70. Patient wants to change Lipitor to Crestor per patient's request. (Patient went to Dr Rodrigues and will get pitavastatin)     Dry macular degeneration: stable following with Dr Andrew @ [facility]     dm2: stable, 6s    Patient follows with dr rodrigues   5 mg tablet Amaryl daily . Watch for low sugars.      PVD: stable, continue statin and aspirin      diastolic CHF and severe aortic stenosis: stable, patient is very active. stable. getting echo with dr montes twice a year      BPH: patient is taking finasteride. He denies any complaints and he needs a refill       patient does not have children. needs to consider POA outs       Colonoscopy may 2021 with dr jenkins            hpi  male cad s/p stent 11/2013, PVD, htn, hld comes for the following    spinal stenosis: pain intermittent, in gluteal region. Going down both legs. Feels like hips are in a vice. Hurts to move his legs. He is seeing spinal Dr Cat who recommended dr jones. Patient is not doing well and would like to get hydrocodone again. He cannot go grocery shopping without significant pain. He says taht he hopes to not need the medication after he goes to pain management.     Other medical issues see below    HX PERICARDITIS AND PERICARDIAL EFFUSION sEPTEMBER 2022: this has since resolved. and he follows with cardiology regularly for this     Resting tremors: worsening in his hands and he is UNABLE TO  hold objects in his hands.     lymphocytic colitis (biopsy + May 18, 2021)diarrhea: patient is on diphenoxylate/atropine for his diarrhea on a regular basis. he was seen by Dr De La Paz (who has just recently retired). he is controlled on dipheoxylate-atropine. he does on occasion during the month require an additional dose. patient continues to do well on his current management      diastolic chf: Patient denies any chest pain, angina or exertional dyspnea. patient denies any swelling to lower extremities. Patient follows with cardiology on a regular basis     echo from feb 2020 ef 60% with moderate to severe aortic valve stenosis jknand mild tr.      Diabetes Type 2: patient denies any low blood sugars. Patient is following with opthalmology on a yearly basis. Patient is taking glimepiride a1c 5s typically. patient following with dr christina.      hypothyroid: patient denies any hypo or hypothyroid symptoms. patient denies any palpitations, diarrhea or constipation     HLD: Patient denies any muscle aches and is tolerating statin therapy     pvd: denies any previous strokes, no headaches. no claudication        social: patient denies any smoking, drug or alcohol abuse     surgical history: left hip replacement  11/2017     [facility]COPY MAY 2021 [facility] with 5 year follow up      HOSPITALIZATIONS  @ [facility] from April 5, 2024 to April 7, 2024 as he had left-sided flank pain that radiated down to his left groin into his testicles.  He says this woke him up out of his early sleep he decided come emergency department for further assessment.  CT scan of the abdomen pelvis shows a left renal mass which is a known mass.  However there is some mild perinephric stranding as well.  Urinalysis was negative for infection labs were unremarkable.  His pain was much better controlled he was seen by urology who did not see any evidence of urological cause.  Heme-onc saw the patient and agreed about the left renal mass being a longstanding issue without any further intervention.    @[facility] from October 30, 2023 to November 3, 2023 patient's was hospitalized for subjective fevers and sweats.  He was found to have a UTI and left pyelonephritis.  Patient was treated for E. coli growing in his urine sensitive to most antibiotics except ampicillin and Unasyn.  Patient was recommended to continue on Cipro 500 mg twice a day for an additional 10 days posthospitalization.  He is noted to have a left solid renal cyst.  An MRI of the abdomen was done showing pyelonephritis but also a 1.6 cm left renal lesion which is characteristic of a hemorrhagic cyst.  Patient's losartan was still on hold upon discharge.        September 3 to September 7, 2022 @[facility] . Patient came in originally to [facility] for shortness of breath and chest pain. Patient was found to have acute pericarditis along with moderate-sized pericardial effusion. He was placed on colchicine twice a day and his symptoms dramatically improved. Cardiology as well as endocrinology was following him and he was discharged on colchicine.     OARRS:  No data recorded  I have personally reviewed the OARRS report for Curtis Brown. I  have considered the risks of abuse, dependence, addiction and diversion    Is the patient prescribed a combination of a benzodiazepine and opioid?  Yes, I feel it is clincially indicated to continue the medication and have discussed with the patient risks/benefits/alternatives.    Last Urine Drug Screen / ordered today: Yes  Recent Results (from the past 8760 hour(s))   Drug Screen, Urine With Reflex to Confirmation    Collection Time: 05/29/24  3:20 PM   Result Value Ref Range    Amphetamine Screen, Urine Presumptive Negative Presumptive Negative    Barbiturate Screen, Urine Presumptive Negative Presumptive Negative    Benzodiazepines Screen, Urine Presumptive Negative Presumptive Negative    Cannabinoid Screen, Urine Presumptive Negative Presumptive Negative    Cocaine Metabolite Screen, Urine Presumptive Negative Presumptive Negative    Fentanyl Screen, Urine Presumptive Negative Presumptive Negative    Opiate Screen, Urine Presumptive Negative Presumptive Negative    Oxycodone Screen, Urine Presumptive Negative Presumptive Negative    PCP Screen, Urine Presumptive Negative Presumptive Negative    Methadone Screen, Urine Presumptive Negative Presumptive Negative   Opiate Confirmation, Urine    Collection Time: 04/15/24  9:53 AM   Result Value Ref Range    6-Acetylmorphine <25 <25 ng/mL    Codeine <50 <50 ng/mL    Hydrocodone 202 (H) <25 ng/mL    Hydromorphone 183 (H) <25 ng/mL    Morphine  <50 <50 ng/mL    Norhydrocodone 262 (H) <25 ng/mL    Noroxycodone <25 <25 ng/mL    Oxycodone <25 <25 ng/mL    Oxymorphone <25 <25 ng/mL       Results are as expected.          Reviewed Controlled Substance Agreement including but not limited to the benefits, risks, and alternatives to treatment with a Controlled Substance medication(s).    Antidiarrheal:   What is the patient's goal of therapy?  Improve diarrhea   Is this being achieved with current treatment? yes  Is the patient currently prescribed an opioid, and/or  "benzodiazepine?   Yes, I feel it is clincially indicated to continue the medication and have discussed with the patient risks/benefits/alternatives.    Activities of Daily Living:   Is your overall impression that this patient is benefiting (symptom reduction outweighs side effects) from antidiarrheal therapy? No     1. Physical Functioning: Better  2. Family Relationship: Better  3. Social Relationship: Better  4. Mood: Better  5. Sleep Patterns: Better  6. Overall Function: Better      Visit Vitals  /72 (BP Location: Right arm, Patient Position: Sitting, BP Cuff Size: Adult)   Pulse 78   Ht 1.727 m (5' 8\")   Wt 86.2 kg (190 lb)   SpO2 96%   BMI 28.89 kg/m²   Smoking Status Never   BSA 2.03 m²     PHYSICAL EXAM   General appearance: Alert and in no acute distress. speech is clear and coherent  No head trauma  Neck: no carotid bruits or thyromegaly. no lymphadenopathy   Respiratory : No respiratory distress, normal respiratory rhythm and effort. Clear bilateral breath sounds. No wheezing or rhonchi.   Cardiovascular: heart rate regular, S1, S2. no murmurs. no Lower extremity edema  Skin inspection: Normal skin color and pigmentation, normal skin turgor and no visible rash, induration, or cellulitis  MSK: 5/5 strength upper and lower extremities without gait abnormalities. no loss of muscle mass . Left lower flank pain. No SI joint paint or central spine pain.   Neuro: 2-12 CN grossly intact.  no slurred speech. no lateralizing deficit  Psychiatric Orientation: Oriented to person, place, and time. no depression, homicidal or suicidal thoughts, normal affect     REVIEW OF SYSTEMS     Constitutional: not feeling tired and no fever, chills or sweats. Denies weight loss  no headache  Cardiovascular: no exertional chest pain, no palpitations, no lower extremity edema   Lungs: Denies shortness of breath, exertional dyspnea, wheezing  Gastrointestinal: no change in bowel habits, no diarrhea, no nausea, no vomiting and " no abdominal pain.   Musculoskeletal: no myalgias, no muscle weakness and no limb swelling.   Skin: no rashes, no change in skin color and pigmentation and no skin lumps.   Neurological: no headaches, no seizures, no numbness, no lateralizing deficits and no fainting.   Psychiatric: no depression and no anxiety.   Urine: denies polyuria, hematuria, dysuria      Allergies   Allergen Reactions    Morphine Unknown    Penicillins Unknown    Valacyclovir Rash       Current Outpatient Medications   Medication Sig Dispense Refill    Accu-Chek Pippa Plus test strp strip USE TO TEST ONCE DAILY.      Accu-Chek Fastclix Lancet Drum misc 1 Lancet early in the morning..      amLODIPine (Norvasc) 10 mg tablet Take 1 tablet (10 mg) by mouth once daily. 90 tablet 3    aspirin 81 mg EC tablet Take 1 tablet (81 mg) by mouth once daily.      biotin 1 mg capsule Take by mouth.      calcium carbonate-vit D3-min 600 mg calcium- 400 unit tablet Take by mouth.      clindamycin (Cleocin) 300 mg capsule Take 1 capsule (300 mg) by mouth. 1 HOUR BEFORE PROCEDURE AND 6 HOURS AFTER PROCEDURE AS DIRECTED      coenzyme Q-10 300 mg capsule capsule Take by mouth.      cyanocobalamin (Vitamin B-12) 100 mcg tablet Take by mouth.      diphenoxylate-atropine (Lomotil) 2.5-0.025 mg tablet Take 1 tablet by mouth once daily. With additional tablet in evening if needed 30 tablet 2    eye vitamin supplement (Ocuvite Eye Health Formula) capsule Take 1 tablet by mouth once daily.      famotidine (Pepcid) 40 mg tablet Take 1 tablet (40 mg) by mouth 2 times a day. 180 tablet 3    fenofibrate (Tricor) 48 mg tablet Take 1 tablet (48 mg) by mouth once daily. 90 tablet 3    finasteride (Proscar) 5 mg tablet Take 1 tablet (5 mg) by mouth once daily. (Patient taking differently: Take 1.25 mg by mouth once daily.) 90 tablet 3    glimepiride (Amaryl) 1 mg tablet Take by mouth.      glimepiride (Amaryl) 2 mg tablet Take 1 tablet (2 mg) by mouth 2 times a day.       glucosamine-D3-Boswellia serr (Osteo Bi-Flex, 5-Loxin,) 1,500-400-100 mg-unit-mg tablet Take by mouth.      hydroCHLOROthiazide (HYDRODiuril) 25 mg tablet Take 1 tablet (25 mg) by mouth once daily. 90 tablet 3    HYDROcodone-acetaminophen (Norco) 5-325 mg tablet Take 2 tablets by mouth every 12 hours if needed for severe pain (7 - 10). 20 tablet 0    lactobacillus acidophilus capsule Take 1 capsule by mouth once daily. With breakfast      levothyroxine (Synthroid, Levoxyl) 50 mcg tablet       Livalo 4 mg tablet       losartan (Cozaar) 50 mg tablet Take 1 tablet (50 mg) by mouth once daily.      metoprolol tartrate (Lopressor) 25 mg tablet TAKE 1 TABLET BY MOUTH TWICE  DAILY 180 tablet 3    multivitamin capsule Take by mouth.      nitroglycerin (Nitrostat) 0.4 mg SL tablet Place 1 tablet (0.4 mg) under the tongue every 5 minutes if needed for chest pain. Under the tongue as needed for chest pain 25 tablet 2    omega-3s-dha-epa-fish oil 720-1,200 mg capsule,delayed release(DR/EC) Take by mouth.      potassium chloride CR (K-Tab) 20 mEq ER tablet Take by mouth.      psyllium (Metamucil) powder Take by mouth.      vit A/vit C/vit E/zinc/copper (PRESERVISION AREDS ORAL) Take by mouth.      vit C/zinc citrate/elderberry (SAMBUCUS ELDERBERRY ORAL) Take by mouth.      primidone (Mysoline) 50 mg tablet Take 1 tablet (50 mg) by mouth once daily. (Patient not taking: Reported on 8/7/2024) 90 tablet 3     No current facility-administered medications for this visit.       Objective     Orders Only on 07/26/2024   Component Date Value Ref Range Status    [facility] MPV 07/26/2024 7.4  7.4 - 10.4 fL Final    [facility] Instr WBC ND 07/26/2024 7.5   Final    [facility] HCT 07/26/2024 41.2  41.0 - 52.0 % Final    [facility] Platelet 07/26/2024 180  150 - 450 x10 Final    [facility] RBC 07/26/2024 4.28 (L)  4.70 - 6.10 x10 Final    [facility] WBC 07/26/2024 7.5  4.5 - 11.0 x10 Final    [facility] RDW 07/26/2024 13.5  11.5 - 14.5 %  Final    [facility] MCHC 07/26/2024 34.1  32.0 - 37.0 g/dL Final    [facility] HGB 07/26/2024 14.0  13.5 - 17.5 g/dL Final    [facility] MCH 07/26/2024 32.8  27.0 - 34.0 pg Final    [facility] MCV 07/26/2024 96.1  80.0 - 100.0 fL Final    [facility] Calculated Osmolality 07/26/2024 286  275 - 295 mOsm/kg Final    [facility] GPT 07/26/2024 31  10 - 49 unit/L Final    [facility] Glucose 07/26/2024 173 (H)  74 - 106 mg/dL Final    [facility] ALB 07/26/2024 4.2  3.4 - 5.0 g/dL Final    [facility] Na 07/26/2024 140  135 - 145 mmol/L Final    [facility] GOT 07/26/2024 21  15 - 37 unit/L Final    [facility] Glomerular Filtration R* 07/26/2024 >60  mL/min/1.73m? Final    [facility] BUN/Creat Ratio 07/26/2024 16.4   Final    [facility] CO2, venous 07/26/2024 28.0  20.0 - 31.0 mmol/L Final    [facility] BUN 07/26/2024 18  9 - 23 mg/dL Final    [facility] A/G Ratio 07/26/2024 1.4   Final    [facility] Total Protein 07/26/2024 7.1  5.7 - 8.2 g/dL Final    [facility] Calcium 07/26/2024 9.9  8.7 - 10.4 mg/dL Final    [facility] Chloride 07/26/2024 105  98 - 107 mmol/L Final    [facility] Alk Phos 07/26/2024 39 (L)  45 - 117 unit/L Final    [facility] Bilirubin, Total 07/26/2024 0.90  0.30 - 1.20 mg/dL Final    [facility] Globulin 07/26/2024 2.9  g/dL Final    [facility] GFR AA 07/26/2024 >60   Final    [facility] Creatinine 07/26/2024 1.1  0.6 - 1.1 mg/dL Final    [facility] K 07/26/2024 3.6  3.5 - 5.1 mmol/L Final    [facility] Total Chol/HDL Chol Rat* 07/26/2024 3.8   Final    [facility] Triglycerides 07/26/2024 393 (H)  30 - 150 mg/dL Final    [facility] Cholesterol 07/26/2024 145  100 - 200 mg/dL Final    [facility] Calculated LDL Choleste* 07/26/2024 28 (L)  60 - 130 mg/dL Final    [facility] HDL Cholesterol 07/26/2024 38 (L)  40 - 60 mg/dL Final    [facility] TSH 07/26/2024 1.91  0.55 - 4.78 uIU/ml Final    [facility] HGB A1C 07/26/2024 5.9  % Final   Lab on 05/29/2024   Component Date Value Ref Range Status    Amphetamine  Screen, Urine 05/29/2024 Presumptive Negative  Presumptive Negative Final    Barbiturate Screen, Urine 05/29/2024 Presumptive Negative  Presumptive Negative Final    Benzodiazepines Screen, Urine 05/29/2024 Presumptive Negative  Presumptive Negative Final    Cannabinoid Screen, Urine 05/29/2024 Presumptive Negative  Presumptive Negative Final    Cocaine Metabolite Screen, Urine 05/29/2024 Presumptive Negative  Presumptive Negative Final    Fentanyl Screen, Urine 05/29/2024 Presumptive Negative  Presumptive Negative Final    Opiate Screen, Urine 05/29/2024 Presumptive Negative  Presumptive Negative Final    Oxycodone Screen, Urine 05/29/2024 Presumptive Negative  Presumptive Negative Final    PCP Screen, Urine 05/29/2024 Presumptive Negative  Presumptive Negative Final    Methadone Screen, Urine 05/29/2024 Presumptive Negative  Presumptive Negative Final   Lab Requisition on 04/29/2024   Component Date Value Ref Range Status    Case Report 04/29/2024    Final                    Value:Medical Cytology Report                           Case: [id number]                                  Authorizing Provider:  Lucita Trotter, Collected:           04/29/2024 1200                                     MD                                                                           Ordering Location:     [facility]       Received:            04/29/2024 1931                                     Center                                                                       Specimen:    RENAL PELVIS FLUID LEFT, Left Renal Kidney lesion and lesion fluid                         Gross Description 04/29/2024    Final                    Value:A. RENAL PELVIS FLUID LEFT.                          Received 50 ml  brown  cloudy fluid with particles in Cytolyt.                           Cytology Technical Only:  this case is processed at [facility].                           No diagnosis rendered on this case.                              Specimen Processing Detail 04/29/2024    Final                    Value:A1 Slides Only (No Block)                           A1-1 Pap Stain NGYN ThinPrep    Lab on 04/15/2024   Component Date Value Ref Range Status    Amphetamine Screen, Urine 04/15/2024 Presumptive Negative  Presumptive Negative Final    Barbiturate Screen, Urine 04/15/2024 Presumptive Negative  Presumptive Negative Final    Benzodiazepines Screen, Urine 04/15/2024 Presumptive Negative  Presumptive Negative Final    Cannabinoid Screen, Urine 04/15/2024 Presumptive Negative  Presumptive Negative Final    Cocaine Metabolite Screen, Urine 04/15/2024 Presumptive Negative  Presumptive Negative Final    Fentanyl Screen, Urine 04/15/2024 Presumptive Negative  Presumptive Negative Final    Opiate Screen, Urine 04/15/2024 Presumptive Positive (A)  Presumptive Negative Final    Oxycodone Screen, Urine 04/15/2024 Presumptive Negative  Presumptive Negative Final    PCP Screen, Urine 04/15/2024 Presumptive Negative  Presumptive Negative Final    Methadone Screen, Urine 04/15/2024 Presumptive Negative  Presumptive Negative Final    6-Acetylmorphine 04/15/2024 <25  <25 ng/mL Final    Codeine 04/15/2024 <50  <50 ng/mL Final    Hydrocodone 04/15/2024 202 (H)  <25 ng/mL Final    Hydromorphone 04/15/2024 183 (H)  <25 ng/mL Final    Morphine  04/15/2024 <50  <50 ng/mL Final    Norhydrocodone 04/15/2024 262 (H)  <25 ng/mL Final    Noroxycodone 04/15/2024 <25  <25 ng/mL Final    Oxycodone 04/15/2024 <25  <25 ng/mL Final    Oxymorphone 04/15/2024 <25  <25 ng/mL Final   Hospital Outpatient Visit on 04/11/2024   Component Date Value Ref Range Status    AV pk gabriela 04/11/2024 3.93  m/s Final-Edited    AV mn grad 04/11/2024 34.4  mmHg Final-Edited    LVOT diam 04/11/2024 2.03  cm Final-Edited    LV Biplane EF 04/11/2024 47  % Final-Edited    MV E/A ratio 04/11/2024 0.54   Final-Edited    LA vol index A/L  04/11/2024 33.4  ml/m2 Final-Edited    Tricuspid annular plane systolic e* 04/11/2024 1.7  cm Final-Edited    RV free wall pk S' 04/11/2024 1.30  cm/s Final-Edited    LVIDd 04/11/2024 4.62  cm Final-Edited    RVSP 04/11/2024 34.7  mmHg Final-Edited    Aortic Valve Area by Continuity of* 04/11/2024 0.89  cm2 Final-Edited    Aortic Valve Area by Continuity of* 04/11/2024 0.77  cm2 Final-Edited    AV pk grad 04/11/2024 61.6  mmHg Final-Edited    LV A4C EF 04/11/2024 50.1   Final-Edited       Radiology: Reviewed imaging in powerchart.  No results found.    Family History   Problem Relation Name Age of Onset    Stroke Mother      Heart failure Mother      Other (hepatic cirrhosis) Father      Parkinsonism Brother       Social History     Socioeconomic History    Marital status:    Tobacco Use    Smoking status: Never    Smokeless tobacco: Never   Substance and Sexual Activity    Alcohol use: Never    Drug use: Never     Past Medical History:   Diagnosis Date    Abnormal result of other cardiovascular function study     Abnormal nuclear stress test    Acute ischemic heart disease, unspecified (Multi)     ACS (acute coronary syndrome)    Atherosclerotic heart disease of native coronary artery without angina pectoris     Arteriosclerotic coronary artery disease    Chronic sinusitis, unspecified     Chronic congestion of paranasal sinus    Coronary angioplasty status     History of percutaneous coronary intervention    Encounter for preprocedural cardiovascular examination     Pre-operative cardiovascular examination    Encounter for screening for malignant neoplasm of prostate     Screening PSA (prostate specific antigen)    Lyme disease, unspecified 12/02/2013    Lyme disease    Other cervical disc displacement, unspecified cervical region     Herniated cervical disc without myelopathy    Other specified postprocedural states     History of carpal tunnel surgery    Other systemic sclerosis (Multi)     Cutaneous  scleroderma    Personal history of malignant neoplasm, unspecified     History of malignant neoplasm    Personal history of other diseases of the circulatory system     History of hypertension    Personal history of other diseases of the circulatory system     History of abnormal electrocardiography    Personal history of other diseases of the digestive system     History of gastroesophageal reflux (GERD)    Personal history of other diseases of the musculoskeletal system and connective tissue     History of low back pain    Personal history of other diseases of the musculoskeletal system and connective tissue     History of spinal stenosis    Personal history of other diseases of the musculoskeletal system and connective tissue     History of arthritis    Personal history of other endocrine, nutritional and metabolic disease     History of hypothyroidism    Personal history of other endocrine, nutritional and metabolic disease     History of hyperlipidemia    Personal history of other endocrine, nutritional and metabolic disease     History of type 2 diabetes mellitus    Personal history of other endocrine, nutritional and metabolic disease     History of hypoglycemia    Personal history of other specified conditions     History of chest pain    Polyp of stomach and duodenum     Gastric polyp     Past Surgical History:   Procedure Laterality Date    CORONARY ANGIOPLASTY WITH STENT PLACEMENT  07/27/2018    Cath Placement Of Stent 1    HAND SURGERY  07/27/2018    Hand Surgery                                                                                                                                                          OTHER SURGICAL HISTORY  07/27/2018    Surgery    OTHER SURGICAL HISTORY  07/27/2018    Total Hip Replacement Left       Charting was completed using voice recognition technology and may include unintended errors.     Subjective   Patient ID: Curtis Brown is a 83 y.o. male who presents for  "the following    Assessment/Plan       Musculoskeletal Pain          Visit Vitals  /72 (BP Location: Right arm, Patient Position: Sitting, BP Cuff Size: Adult)   Pulse 78   Ht 1.727 m (5' 8\")   Wt 86.2 kg (190 lb)   SpO2 96%   BMI 28.89 kg/m²   Smoking Status Never   BSA 2.03 m²     PHYSICAL EXAM       REVIEW OF SYSTEMS       Allergies   Allergen Reactions    Morphine Unknown    Penicillins Unknown    Valacyclovir Rash       Current Outpatient Medications   Medication Sig Dispense Refill    Accu-Chek Pippa Plus test strp strip USE TO TEST ONCE DAILY.      Accu-Chek Fastclix Lancet Drum misc 1 Lancet early in the morning..      amLODIPine (Norvasc) 10 mg tablet Take 1 tablet (10 mg) by mouth once daily. 90 tablet 3    aspirin 81 mg EC tablet Take 1 tablet (81 mg) by mouth once daily.      biotin 1 mg capsule Take by mouth.      calcium carbonate-vit D3-min 600 mg calcium- 400 unit tablet Take by mouth.      clindamycin (Cleocin) 300 mg capsule Take 1 capsule (300 mg) by mouth. 1 HOUR BEFORE PROCEDURE AND 6 HOURS AFTER PROCEDURE AS DIRECTED      coenzyme Q-10 300 mg capsule capsule Take by mouth.      cyanocobalamin (Vitamin B-12) 100 mcg tablet Take by mouth.      diphenoxylate-atropine (Lomotil) 2.5-0.025 mg tablet Take 1 tablet by mouth once daily. With additional tablet in evening if needed 30 tablet 2    eye vitamin supplement (Ocuvite Eye Health Formula) capsule Take 1 tablet by mouth once daily.      famotidine (Pepcid) 40 mg tablet Take 1 tablet (40 mg) by mouth 2 times a day. 180 tablet 3    fenofibrate (Tricor) 48 mg tablet Take 1 tablet (48 mg) by mouth once daily. 90 tablet 3    finasteride (Proscar) 5 mg tablet Take 1 tablet (5 mg) by mouth once daily. (Patient taking differently: Take 1.25 mg by mouth once daily.) 90 tablet 3    glimepiride (Amaryl) 1 mg tablet Take by mouth.      glimepiride (Amaryl) 2 mg tablet Take 1 tablet (2 mg) by mouth 2 times a day.      glucosamine-D3-Boswellia serr (Osteo " Bi-Flex, 5-Loxin,) 1,500-400-100 mg-unit-mg tablet Take by mouth.      hydroCHLOROthiazide (HYDRODiuril) 25 mg tablet Take 1 tablet (25 mg) by mouth once daily. 90 tablet 3    HYDROcodone-acetaminophen (Norco) 5-325 mg tablet Take 2 tablets by mouth every 12 hours if needed for severe pain (7 - 10). 20 tablet 0    lactobacillus acidophilus capsule Take 1 capsule by mouth once daily. With breakfast      levothyroxine (Synthroid, Levoxyl) 50 mcg tablet       Livalo 4 mg tablet       losartan (Cozaar) 50 mg tablet Take 1 tablet (50 mg) by mouth once daily.      metoprolol tartrate (Lopressor) 25 mg tablet TAKE 1 TABLET BY MOUTH TWICE  DAILY 180 tablet 3    multivitamin capsule Take by mouth.      nitroglycerin (Nitrostat) 0.4 mg SL tablet Place 1 tablet (0.4 mg) under the tongue every 5 minutes if needed for chest pain. Under the tongue as needed for chest pain 25 tablet 2    omega-3s-dha-epa-fish oil 720-1,200 mg capsule,delayed release(DR/EC) Take by mouth.      potassium chloride CR (K-Tab) 20 mEq ER tablet Take by mouth.      psyllium (Metamucil) powder Take by mouth.      vit A/vit C/vit E/zinc/copper (PRESERVISION AREDS ORAL) Take by mouth.      vit C/zinc citrate/elderberry (SAMBUCUS ELDERBERRY ORAL) Take by mouth.      primidone (Mysoline) 50 mg tablet Take 1 tablet (50 mg) by mouth once daily. (Patient not taking: Reported on 8/7/2024) 90 tablet 3     No current facility-administered medications for this visit.       Objective     Orders Only on 07/26/2024   Component Date Value Ref Range Status    [facility] MPV 07/26/2024 7.4  7.4 - 10.4 fL Final    [facility] Instr WBC ND 07/26/2024 7.5   Final    [facility] HCT 07/26/2024 41.2  41.0 - 52.0 % Final    [facility] Platelet 07/26/2024 180  150 - 450 x10 Final    [facility] RBC 07/26/2024 4.28 (L)  4.70 - 6.10 x10 Final    [facility] WBC 07/26/2024 7.5  4.5 - 11.0 x10 Final    [facility] RDW 07/26/2024 13.5  11.5 - 14.5 % Final    [facility] MCHC 07/26/2024  34.1  32.0 - 37.0 g/dL Final    [facility] HGB 07/26/2024 14.0  13.5 - 17.5 g/dL Final    [facility] MCH 07/26/2024 32.8  27.0 - 34.0 pg Final    [facility] MCV 07/26/2024 96.1  80.0 - 100.0 fL Final    [facility] Calculated Osmolality 07/26/2024 286  275 - 295 mOsm/kg Final    [facility] GPT 07/26/2024 31  10 - 49 unit/L Final    [facility] Glucose 07/26/2024 173 (H)  74 - 106 mg/dL Final    [facility] ALB 07/26/2024 4.2  3.4 - 5.0 g/dL Final    [facility] Na 07/26/2024 140  135 - 145 mmol/L Final    [facility] GOT 07/26/2024 21  15 - 37 unit/L Final    [facility] Glomerular Filtration R* 07/26/2024 >60  mL/min/1.73m? Final    [facility] BUN/Creat Ratio 07/26/2024 16.4   Final    [facility] CO2, venous 07/26/2024 28.0  20.0 - 31.0 mmol/L Final    [facility] BUN 07/26/2024 18  9 - 23 mg/dL Final    [facility] A/G Ratio 07/26/2024 1.4   Final    [facility] Total Protein 07/26/2024 7.1  5.7 - 8.2 g/dL Final    [facility] Calcium 07/26/2024 9.9  8.7 - 10.4 mg/dL Final    [facility] Chloride 07/26/2024 105  98 - 107 mmol/L Final    [facility] Alk Phos 07/26/2024 39 (L)  45 - 117 unit/L Final    [facility] Bilirubin, Total 07/26/2024 0.90  0.30 - 1.20 mg/dL Final    [facility] Globulin 07/26/2024 2.9  g/dL Final    [facility] GFR AA 07/26/2024 >60   Final    [facility] Creatinine 07/26/2024 1.1  0.6 - 1.1 mg/dL Final    [facility] K 07/26/2024 3.6  3.5 - 5.1 mmol/L Final    [facility] Total Chol/HDL Chol Rat* 07/26/2024 3.8   Final    [facility] Triglycerides 07/26/2024 393 (H)  30 - 150 mg/dL Final    [facility] Cholesterol 07/26/2024 145  100 - 200 mg/dL Final    [facility] Calculated LDL Choleste* 07/26/2024 28 (L)  60 - 130 mg/dL Final    [facility] HDL Cholesterol 07/26/2024 38 (L)  40 - 60 mg/dL Final    [facility] TSH 07/26/2024 1.91  0.55 - 4.78 uIU/ml Final    [facility] HGB A1C 07/26/2024 5.9  % Final   Lab on 05/29/2024   Component Date Value Ref Range Status    Amphetamine Screen, Urine 05/29/2024 Presumptive  Negative  Presumptive Negative Final    Barbiturate Screen, Urine 05/29/2024 Presumptive Negative  Presumptive Negative Final    Benzodiazepines Screen, Urine 05/29/2024 Presumptive Negative  Presumptive Negative Final    Cannabinoid Screen, Urine 05/29/2024 Presumptive Negative  Presumptive Negative Final    Cocaine Metabolite Screen, Urine 05/29/2024 Presumptive Negative  Presumptive Negative Final    Fentanyl Screen, Urine 05/29/2024 Presumptive Negative  Presumptive Negative Final    Opiate Screen, Urine 05/29/2024 Presumptive Negative  Presumptive Negative Final    Oxycodone Screen, Urine 05/29/2024 Presumptive Negative  Presumptive Negative Final    PCP Screen, Urine 05/29/2024 Presumptive Negative  Presumptive Negative Final    Methadone Screen, Urine 05/29/2024 Presumptive Negative  Presumptive Negative Final   Lab Requisition on 04/29/2024   Component Date Value Ref Range Status    Case Report 04/29/2024    Final                    Value:Medical Cytology Report                           Case: [id number]                                  Authorizing Provider:  Lucita Trotter, Collected:           04/29/2024 1200                                     MD                                                                           Ordering Location:     [facility]       Received:            04/29/2024 1931                                     Center                                                                       Specimen:    RENAL PELVIS FLUID LEFT, Left Renal Kidney lesion and lesion fluid                         Gross Description 04/29/2024    Final                    Value:A. RENAL PELVIS FLUID LEFT.                          Received 50 ml  brown  cloudy fluid with particles in Cytolyt.                           Cytology Technical Only:  this case is processed at [facility].                          No diagnosis rendered on this  case.                              Specimen Processing Detail 04/29/2024    Final                    Value:A1 Slides Only (No Block)                           A1-1 Pap Stain NGYN ThinPrep    Lab on 04/15/2024   Component Date Value Ref Range Status    Amphetamine Screen, Urine 04/15/2024 Presumptive Negative  Presumptive Negative Final    Barbiturate Screen, Urine 04/15/2024 Presumptive Negative  Presumptive Negative Final    Benzodiazepines Screen, Urine 04/15/2024 Presumptive Negative  Presumptive Negative Final    Cannabinoid Screen, Urine 04/15/2024 Presumptive Negative  Presumptive Negative Final    Cocaine Metabolite Screen, Urine 04/15/2024 Presumptive Negative  Presumptive Negative Final    Fentanyl Screen, Urine 04/15/2024 Presumptive Negative  Presumptive Negative Final    Opiate Screen, Urine 04/15/2024 Presumptive Positive (A)  Presumptive Negative Final    Oxycodone Screen, Urine 04/15/2024 Presumptive Negative  Presumptive Negative Final    PCP Screen, Urine 04/15/2024 Presumptive Negative  Presumptive Negative Final    Methadone Screen, Urine 04/15/2024 Presumptive Negative  Presumptive Negative Final    6-Acetylmorphine 04/15/2024 <25  <25 ng/mL Final    Codeine 04/15/2024 <50  <50 ng/mL Final    Hydrocodone 04/15/2024 202 (H)  <25 ng/mL Final    Hydromorphone 04/15/2024 183 (H)  <25 ng/mL Final    Morphine  04/15/2024 <50  <50 ng/mL Final    Norhydrocodone 04/15/2024 262 (H)  <25 ng/mL Final    Noroxycodone 04/15/2024 <25  <25 ng/mL Final    Oxycodone 04/15/2024 <25  <25 ng/mL Final    Oxymorphone 04/15/2024 <25  <25 ng/mL Final   Hospital Outpatient Visit on 04/11/2024   Component Date Value Ref Range Status    AV pk gabriela 04/11/2024 3.93  m/s Final-Edited    AV mn grad 04/11/2024 34.4  mmHg Final-Edited    LVOT diam 04/11/2024 2.03  cm Final-Edited    LV Biplane EF 04/11/2024 47  % Final-Edited    MV E/A ratio 04/11/2024 0.54   Final-Edited    LA vol index A/L 04/11/2024 33.4  ml/m2 Final-Edited     Tricuspid annular plane systolic e* 04/11/2024 1.7  cm Final-Edited    RV free wall pk S' 04/11/2024 1.30  cm/s Final-Edited    LVIDd 04/11/2024 4.62  cm Final-Edited    RVSP 04/11/2024 34.7  mmHg Final-Edited    Aortic Valve Area by Continuity of* 04/11/2024 0.89  cm2 Final-Edited    Aortic Valve Area by Continuity of* 04/11/2024 0.77  cm2 Final-Edited    AV pk grad 04/11/2024 61.6  mmHg Final-Edited    LV A4C EF 04/11/2024 50.1   Final-Edited       Radiology: Reviewed imaging in powerchart.  No results found.    Family History   Problem Relation Name Age of Onset    Stroke Mother      Heart failure Mother      Other (hepatic cirrhosis) Father      Parkinsonism Brother       Social History     Socioeconomic History    Marital status:    Tobacco Use    Smoking status: Never    Smokeless tobacco: Never   Substance and Sexual Activity    Alcohol use: Never    Drug use: Never     Past Medical History:   Diagnosis Date    Abnormal result of other cardiovascular function study     Abnormal nuclear stress test    Acute ischemic heart disease, unspecified (Multi)     ACS (acute coronary syndrome)    Atherosclerotic heart disease of native coronary artery without angina pectoris     Arteriosclerotic coronary artery disease    Chronic sinusitis, unspecified     Chronic congestion of paranasal sinus    Coronary angioplasty status     History of percutaneous coronary intervention    Encounter for preprocedural cardiovascular examination     Pre-operative cardiovascular examination    Encounter for screening for malignant neoplasm of prostate     Screening PSA (prostate specific antigen)    Lyme disease, unspecified 12/02/2013    Lyme disease    Other cervical disc displacement, unspecified cervical region     Herniated cervical disc without myelopathy    Other specified postprocedural states     History of carpal tunnel surgery    Other systemic sclerosis (Multi)     Cutaneous scleroderma    Personal history of  malignant neoplasm, unspecified     History of malignant neoplasm    Personal history of other diseases of the circulatory system     History of hypertension    Personal history of other diseases of the circulatory system     History of abnormal electrocardiography    Personal history of other diseases of the digestive system     History of gastroesophageal reflux (GERD)    Personal history of other diseases of the musculoskeletal system and connective tissue     History of low back pain    Personal history of other diseases of the musculoskeletal system and connective tissue     History of spinal stenosis    Personal history of other diseases of the musculoskeletal system and connective tissue     History of arthritis    Personal history of other endocrine, nutritional and metabolic disease     History of hypothyroidism    Personal history of other endocrine, nutritional and metabolic disease     History of hyperlipidemia    Personal history of other endocrine, nutritional and metabolic disease     History of type 2 diabetes mellitus    Personal history of other endocrine, nutritional and metabolic disease     History of hypoglycemia    Personal history of other specified conditions     History of chest pain    Polyp of stomach and duodenum     Gastric polyp     Past Surgical History:   Procedure Laterality Date    CORONARY ANGIOPLASTY WITH STENT PLACEMENT  07/27/2018    Cath Placement Of Stent 1    HAND SURGERY  07/27/2018    Hand Surgery                                                                                                                                                          OTHER SURGICAL HISTORY  07/27/2018    Surgery    OTHER SURGICAL HISTORY  07/27/2018    Total Hip Replacement Left       Charting was completed using voice recognition technology and may include unintended errors.

## 2024-09-09 ENCOUNTER — TELEPHONE (OUTPATIENT)
Dept: PRIMARY CARE | Facility: CLINIC | Age: 84
End: 2024-09-09
Payer: MEDICARE

## 2024-09-09 DIAGNOSIS — R52 UNCONTROLLED PAIN: ICD-10-CM

## 2024-09-09 RX ORDER — HYDROCODONE BITARTRATE AND ACETAMINOPHEN 5; 325 MG/1; MG/1
1 TABLET ORAL EVERY 4 HOURS PRN
Qty: 120 TABLET | Refills: 0 | Status: SHIPPED | OUTPATIENT
Start: 2024-09-09 | End: 2024-10-09

## 2024-09-09 NOTE — TELEPHONE ENCOUNTER
Pt only called once this morning and the message was not routed to me. Will send request to Dr. Gonzalez to review.     LOV: 8/7/24  NOV: 11/20/24

## 2024-09-09 NOTE — TELEPHONE ENCOUNTER
"PT has called several times      Pt states they have spinal stenosis and they need a refill on their hydrocodone. He states last time he was in it was put down as his \"maintenance drug\".    "

## 2024-09-26 PROBLEM — N28.89 RENAL MASS, LEFT: Status: ACTIVE | Noted: 2024-04-05

## 2024-09-26 PROBLEM — R10.9 ACUTE ABDOMINAL PAIN: Status: ACTIVE | Noted: 2024-09-26

## 2024-09-26 PROBLEM — G54.1 LUMBOSACRAL PLEXUS LESION: Status: ACTIVE | Noted: 2024-03-01

## 2024-10-09 ENCOUNTER — TELEPHONE (OUTPATIENT)
Dept: PRIMARY CARE | Facility: CLINIC | Age: 84
End: 2024-10-09
Payer: MEDICARE

## 2024-10-09 DIAGNOSIS — R52 UNCONTROLLED PAIN: ICD-10-CM

## 2024-10-09 RX ORDER — HYDROCODONE BITARTRATE AND ACETAMINOPHEN 5; 325 MG/1; MG/1
1 TABLET ORAL EVERY 4 HOURS PRN
Qty: 120 TABLET | Refills: 0 | Status: SHIPPED | OUTPATIENT
Start: 2024-10-09 | End: 2024-11-08

## 2024-10-09 NOTE — TELEPHONE ENCOUNTER
PT needs a refill on:  HYDROcodone-acetaminophen (Norco)     He takes this as needed and Dr knows PT is to call in monthly to get refill.      Please call PT back to let him know Dr will refill this.

## 2024-10-15 DIAGNOSIS — K21.9 GASTROESOPHAGEAL REFLUX DISEASE, UNSPECIFIED WHETHER ESOPHAGITIS PRESENT: ICD-10-CM

## 2024-10-15 DIAGNOSIS — I15.9 SECONDARY HYPERTENSION: ICD-10-CM

## 2024-10-15 RX ORDER — FAMOTIDINE 40 MG/1
40 TABLET, FILM COATED ORAL 2 TIMES DAILY
Qty: 180 TABLET | Refills: 3 | Status: SHIPPED | OUTPATIENT
Start: 2024-10-15

## 2024-10-15 RX ORDER — AMLODIPINE BESYLATE 10 MG/1
10 TABLET ORAL DAILY
Qty: 90 TABLET | Refills: 3 | Status: SHIPPED | OUTPATIENT
Start: 2024-10-15

## 2024-10-16 ENCOUNTER — APPOINTMENT (OUTPATIENT)
Dept: CARDIOLOGY | Facility: CLINIC | Age: 84
End: 2024-10-16
Payer: MEDICARE

## 2024-10-17 ENCOUNTER — APPOINTMENT (OUTPATIENT)
Dept: CARDIOLOGY | Facility: CLINIC | Age: 84
End: 2024-10-17
Payer: MEDICARE

## 2024-10-30 ENCOUNTER — APPOINTMENT (OUTPATIENT)
Dept: CARDIOLOGY | Facility: CLINIC | Age: 84
End: 2024-10-30
Payer: MEDICARE

## 2024-10-30 ENCOUNTER — HOSPITAL ENCOUNTER (OUTPATIENT)
Dept: CARDIOLOGY | Facility: CLINIC | Age: 84
Discharge: HOME | End: 2024-10-30
Payer: MEDICARE

## 2024-10-30 VITALS
DIASTOLIC BLOOD PRESSURE: 80 MMHG | HEART RATE: 53 BPM | WEIGHT: 190 LBS | BODY MASS INDEX: 28.79 KG/M2 | SYSTOLIC BLOOD PRESSURE: 120 MMHG | OXYGEN SATURATION: 96 % | HEIGHT: 68 IN

## 2024-10-30 DIAGNOSIS — I25.10 ATHSCL HEART DISEASE OF NATIVE CORONARY ARTERY W/O ANG PCTRS: ICD-10-CM

## 2024-10-30 DIAGNOSIS — I35.0 NONRHEUMATIC AORTIC VALVE STENOSIS: Primary | ICD-10-CM

## 2024-10-30 DIAGNOSIS — I10 PRIMARY HYPERTENSION: ICD-10-CM

## 2024-10-30 DIAGNOSIS — Z95.5 PRESENCE OF STENT IN CORONARY ARTERY: ICD-10-CM

## 2024-10-30 DIAGNOSIS — I35.0 NONRHEUMATIC AORTIC VALVE STENOSIS: ICD-10-CM

## 2024-10-30 DIAGNOSIS — E78.00 PURE HYPERCHOLESTEROLEMIA: ICD-10-CM

## 2024-10-30 DIAGNOSIS — E78.1 HYPERTRIGLYCERIDEMIA: ICD-10-CM

## 2024-10-30 DIAGNOSIS — I31.9 PERICARDITIS, UNSPECIFIED CHRONICITY, UNSPECIFIED TYPE (HHS-HCC): ICD-10-CM

## 2024-10-30 LAB
AORTIC VALVE MEAN GRADIENT: 31.4 MMHG
AORTIC VALVE PEAK VELOCITY: 4.03 M/S
ATRIAL RATE: 62 BPM
AV PEAK GRADIENT: 64.9 MMHG
AVA (PEAK VEL): 0.67 CM2
AVA (VTI): 0.78 CM2
EJECTION FRACTION APICAL 4 CHAMBER: 62
EJECTION FRACTION: 63 %
LEFT ATRIUM VOLUME AREA LENGTH INDEX BSA: 27.5 ML/M2
LEFT VENTRICLE INTERNAL DIMENSION DIASTOLE: 5.27 CM (ref 3.5–6)
LEFT VENTRICULAR OUTFLOW TRACT DIAMETER: 1.99 CM
P AXIS: 34 DEGREES
P OFFSET: 208 MS
P ONSET: 150 MS
PR INTERVAL: 150 MS
Q ONSET: 225 MS
QRS COUNT: 10 BEATS
QRS DURATION: 136 MS
QT INTERVAL: 440 MS
QTC CALCULATION(BAZETT): 446 MS
QTC FREDERICIA: 445 MS
R AXIS: -19 DEGREES
RIGHT VENTRICLE FREE WALL PEAK S': 0.1 CM/S
RIGHT VENTRICLE PEAK SYSTOLIC PRESSURE: 15.3 MMHG
T AXIS: 11 DEGREES
T OFFSET: 445 MS
TRICUSPID ANNULAR PLANE SYSTOLIC EXCURSION: 1.9 CM
VENTRICULAR RATE: 62 BPM

## 2024-10-30 PROCEDURE — 99215 OFFICE O/P EST HI 40 MIN: CPT | Mod: 25 | Performed by: INTERNAL MEDICINE

## 2024-10-30 PROCEDURE — 99215 OFFICE O/P EST HI 40 MIN: CPT | Performed by: INTERNAL MEDICINE

## 2024-10-30 PROCEDURE — 93010 ELECTROCARDIOGRAM REPORT: CPT | Performed by: INTERNAL MEDICINE

## 2024-10-30 PROCEDURE — 3078F DIAST BP <80 MM HG: CPT | Performed by: INTERNAL MEDICINE

## 2024-10-30 PROCEDURE — 93005 ELECTROCARDIOGRAM TRACING: CPT | Performed by: INTERNAL MEDICINE

## 2024-10-30 PROCEDURE — 1036F TOBACCO NON-USER: CPT | Performed by: INTERNAL MEDICINE

## 2024-10-30 PROCEDURE — 3074F SYST BP LT 130 MM HG: CPT | Performed by: INTERNAL MEDICINE

## 2024-11-07 DIAGNOSIS — R52 UNCONTROLLED PAIN: ICD-10-CM

## 2024-11-08 RX ORDER — HYDROCODONE BITARTRATE AND ACETAMINOPHEN 5; 325 MG/1; MG/1
1 TABLET ORAL EVERY 4 HOURS PRN
Qty: 120 TABLET | Refills: 0 | Status: SHIPPED | OUTPATIENT
Start: 2024-11-08 | End: 2024-12-08

## 2024-11-20 ENCOUNTER — APPOINTMENT (OUTPATIENT)
Dept: PRIMARY CARE | Facility: CLINIC | Age: 84
End: 2024-11-20
Payer: MEDICARE

## 2024-11-20 VITALS
DIASTOLIC BLOOD PRESSURE: 70 MMHG | BODY MASS INDEX: 29.25 KG/M2 | WEIGHT: 193 LBS | OXYGEN SATURATION: 97 % | HEART RATE: 71 BPM | SYSTOLIC BLOOD PRESSURE: 128 MMHG | HEIGHT: 68 IN

## 2024-11-20 DIAGNOSIS — E03.9 HYPOTHYROIDISM, UNSPECIFIED TYPE: ICD-10-CM

## 2024-11-20 DIAGNOSIS — E78.1 HYPERTRIGLYCERIDEMIA: ICD-10-CM

## 2024-11-20 DIAGNOSIS — Z00.00 WELLNESS EXAMINATION: Primary | ICD-10-CM

## 2024-11-20 DIAGNOSIS — I10 PRIMARY HYPERTENSION: ICD-10-CM

## 2024-11-20 DIAGNOSIS — G25.2 RESTING TREMOR: ICD-10-CM

## 2024-11-20 DIAGNOSIS — E11.9 TYPE 2 DIABETES MELLITUS WITHOUT COMPLICATION, WITHOUT LONG-TERM CURRENT USE OF INSULIN (MULTI): ICD-10-CM

## 2024-11-20 PROCEDURE — G0439 PPPS, SUBSEQ VISIT: HCPCS | Performed by: INTERNAL MEDICINE

## 2024-11-20 PROCEDURE — 1036F TOBACCO NON-USER: CPT | Performed by: INTERNAL MEDICINE

## 2024-11-20 PROCEDURE — 3074F SYST BP LT 130 MM HG: CPT | Performed by: INTERNAL MEDICINE

## 2024-11-20 PROCEDURE — 99214 OFFICE O/P EST MOD 30 MIN: CPT | Performed by: INTERNAL MEDICINE

## 2024-11-20 PROCEDURE — 3078F DIAST BP <80 MM HG: CPT | Performed by: INTERNAL MEDICINE

## 2024-11-20 PROCEDURE — 1170F FXNL STATUS ASSESSED: CPT | Performed by: INTERNAL MEDICINE

## 2024-11-20 PROCEDURE — 1159F MED LIST DOCD IN RCRD: CPT | Performed by: INTERNAL MEDICINE

## 2024-11-20 PROCEDURE — 1123F ACP DISCUSS/DSCN MKR DOCD: CPT | Performed by: INTERNAL MEDICINE

## 2024-11-20 PROCEDURE — 1160F RVW MEDS BY RX/DR IN RCRD: CPT | Performed by: INTERNAL MEDICINE

## 2024-11-20 RX ORDER — GABAPENTIN 300 MG/1
300 CAPSULE ORAL NIGHTLY
Qty: 90 CAPSULE | Refills: 3 | Status: SHIPPED | OUTPATIENT
Start: 2024-11-20 | End: 2025-11-15

## 2024-11-20 ASSESSMENT — ACTIVITIES OF DAILY LIVING (ADL)
BATHING: INDEPENDENT
DOING_HOUSEWORK: INDEPENDENT
DRESSING: INDEPENDENT
TAKING_MEDICATION: INDEPENDENT
GROCERY_SHOPPING: INDEPENDENT
MANAGING_FINANCES: INDEPENDENT

## 2024-11-20 ASSESSMENT — PATIENT HEALTH QUESTIONNAIRE - PHQ9
1. LITTLE INTEREST OR PLEASURE IN DOING THINGS: NOT AT ALL
SUM OF ALL RESPONSES TO PHQ9 QUESTIONS 1 AND 2: 0
2. FEELING DOWN, DEPRESSED OR HOPELESS: NOT AT ALL

## 2024-11-20 ASSESSMENT — ENCOUNTER SYMPTOMS: PAIN: 1

## 2024-11-20 NOTE — PROGRESS NOTES
Subjective   Patient ID: Curtis Brown is a 83 y.o. 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   Following with urology Dr Saldaña. Surveillance only at this time.     Hx spinal stenosis following with dr thorpe and dr robert ghosh   Patient may not require hydrocodone after he has a steroid injection to spine.   Urine drug screen 5/29/2024 ordered  Hydrocodone 5-325mg bid #120, needs to come in on December 9 , 2024  Controlled substance agreement signed 4/15/2024     lymphocytic colitis diarrhea: stable on diphenoxylate  #30    Not due yet    Hx SEPSIS ACUTE PYELONEPHROSIS 10/2023: resolved at this time    Slight anemia hgb trending 11-12s lately but 1 year ago his hgb was 15. Iron/b12/folte wnl.     Resting tremor:   primidone (made him sick to his stomach)  Will try gabapentin 300mg at bedtime 11/20/24    PSA elevated 6s (October 2023, not normal)  PSA wnl now will  follow up to urology     pericardial effusion: Patient no longer on colchicine and has follow up with dr montes     Hypothyroid: tsh wnl      htn: stable, continue metoprolol.  improved, continue on   hctz to 25mg daily ,   amlodipine 10mg daily.   Losartan 50mg daily       Metoprolol 25mg bid      hld: stable, continue statin. LDL < 70. Patient wants to change Lipitor to Crestor per patient's request. Patient went to Dr Rodrigues and now is on livalo/pitavastatin, but its too expensive. He is thinking about changing his statin back to crestor      Dry macular degeneration: stable following with Dr Andrew @ [facility]     dm2: stable, 6s    Patient follows with dr rodrigues   5 mg tablet Amaryl daily . Watch for low sugars.      PVD: stable, continue statin and aspirin      diastolic CHF and severe aortic stenosis: stable, patient is very active. stable. getting echo with dr montes twice a year      BPH: patient is taking  finasteride. He denies any complaints and he needs a refill       patient does not have children. needs to consider POA outs       Colonoscopy may 2021 with dr jenkins           hpi  male cad s/p stent 11/2013, PVD, htn, hld comes for the following    spinal stenosis: pain intermittent, in gluteal region. Going down both legs. Feels like hips are in a vice. Hurts to move his legs. He is seeing spinal Dr Cat who recommended dr jones. Patient is not doing well and would like to get hydrocodone again. He cannot go grocery shopping without significant pain. He says taht he hopes to not need the medication after he goes to pain management.        HX PERICARDITIS AND PERICARDIAL EFFUSION sEPTEMBER 2022: this has since resolved. and he follows with cardiology regularly for this     Resting tremors: worsening in his hands and he is UNABLE TO  hold objects in his hands.     lymphocytic colitis (biopsy + May 18, 2021)diarrhea: patient is on diphenoxylate/atropine for his diarrhea on a regular basis. he was seen by Dr Jenkins (who has just recently retired). he is controlled on dipheoxylate-atropine. he does on occasion during the month require an additional dose. patient continues to do well on his current management      diastolic chf: Patient denies any chest pain, angina or exertional dyspnea. patient denies any swelling to lower extremities. Patient follows with cardiology on a regular basis      Severe  aortic valve stenosis      Diabetes Type 2: patient denies any low blood sugars. Patient is following with opthalmology on a yearly basis. Patient is taking glimepiride a1c 5s typically. patient following with dr christina.      hypothyroid: patient denies any hypo or hypothyroid symptoms. patient denies any palpitations, diarrhea or constipation     HLD: Patient denies any muscle aches and is tolerating statin therapy     pvd: denies any previous strokes, no headaches. no claudication        social: patient denies any  smoking, drug or alcohol abuse     surgical history: left hip replacement 11/2017     [facility]OSCOPY MAY 2021 AT [facility] with 5 year follow up      HOSPITALIZATIONS  @ [facility] from April 5, 2024 to April 7, 2024 as he had left-sided flank pain that radiated down to his left groin into his testicles.  He says this woke him up out of his early sleep he decided come emergency department for further assessment.  CT scan of the abdomen pelvis shows a left renal mass which is a known mass.  However there is some mild perinephric stranding as well.  Urinalysis was negative for infection labs were unremarkable.  His pain was much better controlled he was seen by urology who did not see any evidence of urological cause.  Heme-onc saw the patient and agreed about the left renal mass being a longstanding issue without any further intervention.    @[facility] from October 30, 2023 to November 3, 2023 patient's was hospitalized for subjective fevers and sweats.  He was found to have a UTI and left pyelonephritis.  Patient was treated for E. coli growing in his urine sensitive to most antibiotics except ampicillin and Unasyn.  Patient was recommended to continue on Cipro 500 mg twice a day for an additional 10 days posthospitalization.  He is noted to have a left solid renal cyst.  An MRI of the abdomen was done showing pyelonephritis but also a 1.6 cm left renal lesion which is characteristic of a hemorrhagic cyst.  Patient's losartan was still on hold upon discharge.        September 3 to September 7, 2022 @[facility] . Patient came in originally to [facility] for shortness of breath and chest pain. Patient was found to have acute pericarditis along with moderate-sized pericardial effusion. He was placed on colchicine twice a day and his symptoms dramatically improved. Cardiology as well as endocrinology was following him and he was discharged on colchicine.     OARRS:  No data  recorded  I have personally reviewed the OARRS report for Curtis Brown. I have considered the risks of abuse, dependence, addiction and diversion    Is the patient prescribed a combination of a benzodiazepine and opioid?  Yes, I feel it is clincially indicated to continue the medication and have discussed with the patient risks/benefits/alternatives.    Last Urine Drug Screen / ordered today: Yes  Recent Results (from the past 8760 hours)   Drug Screen, Urine With Reflex to Confirmation    Collection Time: 08/07/24 12:16 PM   Result Value Ref Range    Amphetamine Screen, Urine Presumptive Negative Presumptive Negative    Barbiturate Screen, Urine Presumptive Negative Presumptive Negative    Benzodiazepines Screen, Urine Presumptive Negative Presumptive Negative    Cannabinoid Screen, Urine Presumptive Negative Presumptive Negative    Cocaine Metabolite Screen, Urine Presumptive Negative Presumptive Negative    Fentanyl Screen, Urine Presumptive Negative Presumptive Negative    Opiate Screen, Urine Presumptive Negative Presumptive Negative    Oxycodone Screen, Urine Presumptive Negative Presumptive Negative    PCP Screen, Urine Presumptive Negative Presumptive Negative    Methadone Screen, Urine Presumptive Negative Presumptive Negative   Opiate Confirmation, Urine    Collection Time: 04/15/24  9:53 AM   Result Value Ref Range    6-Acetylmorphine <25 <25 ng/mL    Codeine <50 <50 ng/mL    Hydrocodone 202 (H) <25 ng/mL    Hydromorphone 183 (H) <25 ng/mL    Morphine  <50 <50 ng/mL    Norhydrocodone 262 (H) <25 ng/mL    Noroxycodone <25 <25 ng/mL    Oxycodone <25 <25 ng/mL    Oxymorphone <25 <25 ng/mL       Results are as expected.          Reviewed Controlled Substance Agreement including but not limited to the benefits, risks, and alternatives to treatment with a Controlled Substance medication(s).    Antidiarrheal:   What is the patient's goal of therapy?  Improve diarrhea   Is this being achieved with current  "treatment? yes  Is the patient currently prescribed an opioid, and/or benzodiazepine?   Yes, I feel it is clincially indicated to continue the medication and have discussed with the patient risks/benefits/alternatives.    Activities of Daily Living:   Is your overall impression that this patient is benefiting (symptom reduction outweighs side effects) from antidiarrheal therapy? No     1. Physical Functioning: Better  2. Family Relationship: Better  3. Social Relationship: Better  4. Mood: Better  5. Sleep Patterns: Better  6. Overall Function: Better      Visit Vitals  /70 (BP Location: Left arm, Patient Position: Sitting, BP Cuff Size: Adult)   Pulse 71   Ht 1.727 m (5' 8\")   Wt 87.5 kg (193 lb)   SpO2 97%   BMI 29.35 kg/m²   Smoking Status Never   BSA 2.05 m²     PHYSICAL EXAM   General appearance: Alert and in no acute distress. speech is clear and coherent  No head trauma  Neck: no carotid bruits or thyromegaly. no lymphadenopathy   Respiratory : No respiratory distress, normal respiratory rhythm and effort. Clear bilateral breath sounds. No wheezing or rhonchi.   Cardiovascular: heart rate regular, S1, S2. no murmurs. no Lower extremity edema  Skin inspection: Normal skin color and pigmentation, normal skin turgor and no visible rash, induration, or cellulitis  MSK: 5/5 strength upper and lower extremities without gait abnormalities. no loss of muscle mass . Left lower flank pain. No SI joint paint or central spine pain.   Neuro: 2-12 CN grossly intact.  no slurred speech. no lateralizing deficit  Psychiatric Orientation: Oriented to person, place, and time. no depression, homicidal or suicidal thoughts, normal affect     REVIEW OF SYSTEMS     Constitutional: not feeling tired and no fever, chills or sweats. Denies weight loss  no headache  Cardiovascular: no exertional chest pain, no palpitations, no lower extremity edema   Lungs: Denies shortness of breath, exertional dyspnea, " wheezing  Gastrointestinal: no change in bowel habits, no diarrhea, no nausea, no vomiting and no abdominal pain.   Musculoskeletal: no myalgias, no muscle weakness and no limb swelling.    Neurological: no headaches, no seizures, no numbness, no lateralizing deficits and no fainting.   Psychiatric: no depression and no anxiety.   Urine: denies polyuria, hematuria, dysuria      Allergies   Allergen Reactions    Morphine Unknown    Penicillins Unknown    Valacyclovir Rash       Current Outpatient Medications   Medication Sig Dispense Refill    Accu-Chek Pippa Plus test strp strip USE TO TEST ONCE DAILY.      Accu-Chek Fastclix Lancet Drum misc 1 Lancet early in the morning..      amLODIPine (Norvasc) 10 mg tablet TAKE 1 TABLET BY MOUTH ONCE  DAILY 90 tablet 3    aspirin 81 mg EC tablet Take 1 tablet (81 mg) by mouth once daily.      clindamycin (Cleocin) 300 mg capsule Take 1 capsule (300 mg) by mouth. 1 HOUR BEFORE PROCEDURE AND 6 HOURS AFTER PROCEDURE AS DIRECTED      coenzyme Q-10 300 mg capsule capsule Take by mouth.      cyanocobalamin (Vitamin B-12) 100 mcg tablet Take by mouth.      famotidine (Pepcid) 40 mg tablet TAKE 1 TABLET BY MOUTH TWICE  DAILY 180 tablet 3    fenofibrate (Tricor) 48 mg tablet Take 1 tablet (48 mg) by mouth once daily. 90 tablet 2    finasteride (Proscar) 5 mg tablet Take 1 tablet (5 mg) by mouth once daily. (Patient taking differently: Take 1.25 mg by mouth once daily.) 90 tablet 3    glimepiride (Amaryl) 1 mg tablet Take by mouth.      glimepiride (Amaryl) 2 mg tablet Take 1 tablet (2 mg) by mouth 2 times a day.      hydroCHLOROthiazide (HYDRODiuril) 25 mg tablet Take 1 tablet (25 mg) by mouth once daily. 90 tablet 3    HYDROcodone-acetaminophen (Norco) 5-325 mg tablet Take 1 tablet by mouth every 4 hours if needed for severe pain (7 - 10). 120 tablet 0    lactobacillus acidophilus capsule Take 1 capsule by mouth once daily. With breakfast      levothyroxine (Synthroid, Levoxyl) 50 mcg  tablet       Livalo 4 mg tablet       losartan (Cozaar) 50 mg tablet Take 1 tablet (50 mg) by mouth once daily.      metoprolol tartrate (Lopressor) 25 mg tablet TAKE 1 TABLET BY MOUTH TWICE  DAILY 180 tablet 3    multivitamin capsule Take by mouth.      nitroglycerin (Nitrostat) 0.4 mg SL tablet Place 1 tablet (0.4 mg) under the tongue every 5 minutes if needed for chest pain. Under the tongue as needed for chest pain 25 tablet 2    omega-3s-dha-epa-fish oil 720-1,200 mg capsule,delayed release(DR/EC) Take by mouth.      potassium chloride CR (K-Tab) 20 mEq ER tablet Take by mouth.      psyllium (Metamucil) powder Take by mouth.      vit A/vit C/vit E/zinc/copper (PRESERVISION AREDS ORAL) 1 caps, ORAL, BID, Refill(s) 0, Date: 4/5/24 4:20:00 PM EDT      vit C/zinc citrate/elderberry (SAMBUCUS ELDERBERRY ORAL) Take by mouth.      diphenoxylate-atropine (Lomotil) 2.5-0.025 mg tablet Take 1 tablet by mouth once daily. With additional tablet in evening if needed (Patient not taking: Reported on 11/20/2024) 30 tablet 2    primidone (Mysoline) 50 mg tablet Take 1 tablet (50 mg) by mouth once daily. (Patient not taking: Reported on 11/20/2024) 90 tablet 3     No current facility-administered medications for this visit.       Objective     Hospital Outpatient Visit on 10/30/2024   Component Date Value Ref Range Status    AV pk gabriela 10/30/2024 4.03  m/s Final    AV mn grad 10/30/2024 31.4  mmHg Final    LVOT diam 10/30/2024 1.99  cm Final    LA vol index A/L 10/30/2024 27.5  ml/m2 Final    Tricuspid annular plane systolic e* 10/30/2024 1.9  cm Final    LV EF 10/30/2024 63  % Final    RV free wall pk S' 10/30/2024 0.10  cm/s Final    LVIDd 10/30/2024 5.27  cm Final    RVSP 10/30/2024 15.3  mmHg Final    Aortic Valve Area by Continuity of* 10/30/2024 0.78  cm2 Final    Aortic Valve Area by Continuity of* 10/30/2024 0.67  cm2 Final    AV pk grad 10/30/2024 64.9  mmHg Final    LV A4C EF 10/30/2024 62.0   Final   Office Visit on  10/30/2024   Component Date Value Ref Range Status    Ventricular Rate 10/30/2024 62  BPM Final    Atrial Rate 10/30/2024 62  BPM Final    AR Interval 10/30/2024 150  ms Final    QRS Duration 10/30/2024 136  ms Final    QT Interval 10/30/2024 440  ms Final    QTC Calculation(Bazett) 10/30/2024 446  ms Final    P Axis 10/30/2024 34  degrees Final    R Axis 10/30/2024 -19  degrees Final    T Axis 10/30/2024 11  degrees Final    QRS Count 10/30/2024 10  beats Final    Q Onset 10/30/2024 225  ms Final    P Onset 10/30/2024 150  ms Final    P Offset 10/30/2024 208  ms Final    T Offset 10/30/2024 445  ms Final    QTC Fredericia 10/30/2024 445  ms Final   Lab on 08/07/2024   Component Date Value Ref Range Status    Amphetamine Screen, Urine 08/07/2024 Presumptive Negative  Presumptive Negative Final    Barbiturate Screen, Urine 08/07/2024 Presumptive Negative  Presumptive Negative Final    Benzodiazepines Screen, Urine 08/07/2024 Presumptive Negative  Presumptive Negative Final    Cannabinoid Screen, Urine 08/07/2024 Presumptive Negative  Presumptive Negative Final    Cocaine Metabolite Screen, Urine 08/07/2024 Presumptive Negative  Presumptive Negative Final    Fentanyl Screen, Urine 08/07/2024 Presumptive Negative  Presumptive Negative Final    Opiate Screen, Urine 08/07/2024 Presumptive Negative  Presumptive Negative Final    Oxycodone Screen, Urine 08/07/2024 Presumptive Negative  Presumptive Negative Final    PCP Screen, Urine 08/07/2024 Presumptive Negative  Presumptive Negative Final    Methadone Screen, Urine 08/07/2024 Presumptive Negative  Presumptive Negative Final   Orders Only on 07/26/2024   Component Date Value Ref Range Status    [facility] MPV 07/26/2024 7.4  7.4 - 10.4 fL Final    [facility] Instr WBC ND 07/26/2024 7.5   Final    [facility] HCT 07/26/2024 41.2  41.0 - 52.0 % Final    [facility] Platelet 07/26/2024 180  150 - 450 x10 Final    [facility] RBC 07/26/2024 4.28 (L)  4.70 - 6.10 x10 Final     [facility] WBC 07/26/2024 7.5  4.5 - 11.0 x10 Final    [facility] RDW 07/26/2024 13.5  11.5 - 14.5 % Final    [facility] MCHC 07/26/2024 34.1  32.0 - 37.0 g/dL Final    [facility] HGB 07/26/2024 14.0  13.5 - 17.5 g/dL Final    [facility] MCH 07/26/2024 32.8  27.0 - 34.0 pg Final    [facility] MCV 07/26/2024 96.1  80.0 - 100.0 fL Final    [facility] Calculated Osmolality 07/26/2024 286  275 - 295 mOsm/kg Final    [facility] GPT 07/26/2024 31  10 - 49 unit/L Final    [facility] Glucose 07/26/2024 173 (H)  74 - 106 mg/dL Final    [facility] ALB 07/26/2024 4.2  3.4 - 5.0 g/dL Final    [facility] Na 07/26/2024 140  135 - 145 mmol/L Final    [facility] GOT 07/26/2024 21  15 - 37 unit/L Final    [facility] Glomerular Filtration R* 07/26/2024 >60  mL/min/1.73m? Final    [facility] BUN/Creat Ratio 07/26/2024 16.4   Final    [facility] CO2, venous 07/26/2024 28.0  20.0 - 31.0 mmol/L Final    [facility] BUN 07/26/2024 18  9 - 23 mg/dL Final    [facility] A/G Ratio 07/26/2024 1.4   Final    [facility] Total Protein 07/26/2024 7.1  5.7 - 8.2 g/dL Final    [facility] Calcium 07/26/2024 9.9  8.7 - 10.4 mg/dL Final    [facility] Chloride 07/26/2024 105  98 - 107 mmol/L Final    [facility] Alk Phos 07/26/2024 39 (L)  45 - 117 unit/L Final    [facility] Bilirubin, Total 07/26/2024 0.90  0.30 - 1.20 mg/dL Final    [facility] Globulin 07/26/2024 2.9  g/dL Final    [facility] GFR AA 07/26/2024 >60   Final    [facility] Creatinine 07/26/2024 1.1  0.6 - 1.1 mg/dL Final    [facility] K 07/26/2024 3.6  3.5 - 5.1 mmol/L Final    [facility] Total Chol/HDL Chol Rat* 07/26/2024 3.8   Final    [facility] Triglycerides 07/26/2024 393 (H)  30 - 150 mg/dL Final    [facility] Cholesterol 07/26/2024 145  100 - 200 mg/dL Final    [facility] Calculated LDL Choleste* 07/26/2024 28 (L)  60 - 130 mg/dL Final    [facility] HDL Cholesterol 07/26/2024 38 (L)  40 - 60 mg/dL Final    [facility] TSH 07/26/2024 1.91  0.55 - 4.78 uIU/ml Final    [facility] HGB  A1C 07/26/2024 5.9  % Final       Radiology: Reviewed imaging in powerchart.  No results found.    Family History   Problem Relation Name Age of Onset    Stroke Mother      Heart failure Mother      Other (hepatic cirrhosis) Father      Parkinsonism Brother       Social History     Socioeconomic History    Marital status:    Tobacco Use    Smoking status: Never    Smokeless tobacco: Never   Substance and Sexual Activity    Alcohol use: Never    Drug use: Never     Past Medical History:   Diagnosis Date    Abnormal result of other cardiovascular function study     Abnormal nuclear stress test    Acute ischemic heart disease, unspecified     ACS (acute coronary syndrome)    Atherosclerotic heart disease of native coronary artery without angina pectoris     Arteriosclerotic coronary artery disease    Chronic sinusitis, unspecified     Chronic congestion of paranasal sinus    Coronary angioplasty status     History of percutaneous coronary intervention    Encounter for preprocedural cardiovascular examination     Pre-operative cardiovascular examination    Encounter for screening for malignant neoplasm of prostate     Screening PSA (prostate specific antigen)    Lyme disease, unspecified 12/02/2013    Lyme disease    Other cervical disc displacement, unspecified cervical region     Herniated cervical disc without myelopathy    Other specified postprocedural states     History of carpal tunnel surgery    Other systemic sclerosis     Cutaneous scleroderma    Personal history of malignant neoplasm, unspecified     History of malignant neoplasm    Personal history of other diseases of the circulatory system     History of hypertension    Personal history of other diseases of the circulatory system     History of abnormal electrocardiography    Personal history of other diseases of the digestive system     History of gastroesophageal reflux (GERD)    Personal history of other diseases of the musculoskeletal system and  "connective tissue     History of low back pain    Personal history of other diseases of the musculoskeletal system and connective tissue     History of spinal stenosis    Personal history of other diseases of the musculoskeletal system and connective tissue     History of arthritis    Personal history of other endocrine, nutritional and metabolic disease     History of hypothyroidism    Personal history of other endocrine, nutritional and metabolic disease     History of hyperlipidemia    Personal history of other endocrine, nutritional and metabolic disease     History of type 2 diabetes mellitus    Personal history of other endocrine, nutritional and metabolic disease     History of hypoglycemia    Personal history of other specified conditions     History of chest pain    Polyp of stomach and duodenum     Gastric polyp     Past Surgical History:   Procedure Laterality Date    CORONARY ANGIOPLASTY WITH STENT PLACEMENT  07/27/2018    Cath Placement Of Stent 1    HAND SURGERY  07/27/2018    Hand Surgery                                                                                                                                                          OTHER SURGICAL HISTORY  07/27/2018    Surgery    OTHER SURGICAL HISTORY  07/27/2018    Total Hip Replacement Left       Charting was completed using voice recognition technology and may include unintended errors.     Subjective   Patient ID: Curtis Brown is a 83 y.o. male who presents for the following           Visit Vitals  /70 (BP Location: Left arm, Patient Position: Sitting, BP Cuff Size: Adult)   Pulse 71   Ht 1.727 m (5' 8\")   Wt 87.5 kg (193 lb)   SpO2 97%   BMI 29.35 kg/m²   Smoking Status Never   BSA 2.05 m²     PHYSICAL EXAM       REVIEW OF SYSTEMS       Allergies   Allergen Reactions    Morphine Unknown    Penicillins Unknown    Valacyclovir Rash       Current Outpatient Medications   Medication Sig Dispense Refill    Accu-Chek Pippa Plus test " strp strip USE TO TEST ONCE DAILY.      Accu-Chek Fastclix Lancet Drum misc 1 Lancet early in the morning..      amLODIPine (Norvasc) 10 mg tablet TAKE 1 TABLET BY MOUTH ONCE  DAILY 90 tablet 3    aspirin 81 mg EC tablet Take 1 tablet (81 mg) by mouth once daily.      clindamycin (Cleocin) 300 mg capsule Take 1 capsule (300 mg) by mouth. 1 HOUR BEFORE PROCEDURE AND 6 HOURS AFTER PROCEDURE AS DIRECTED      coenzyme Q-10 300 mg capsule capsule Take by mouth.      cyanocobalamin (Vitamin B-12) 100 mcg tablet Take by mouth.      famotidine (Pepcid) 40 mg tablet TAKE 1 TABLET BY MOUTH TWICE  DAILY 180 tablet 3    fenofibrate (Tricor) 48 mg tablet Take 1 tablet (48 mg) by mouth once daily. 90 tablet 2    finasteride (Proscar) 5 mg tablet Take 1 tablet (5 mg) by mouth once daily. (Patient taking differently: Take 1.25 mg by mouth once daily.) 90 tablet 3    glimepiride (Amaryl) 1 mg tablet Take by mouth.      glimepiride (Amaryl) 2 mg tablet Take 1 tablet (2 mg) by mouth 2 times a day.      hydroCHLOROthiazide (HYDRODiuril) 25 mg tablet Take 1 tablet (25 mg) by mouth once daily. 90 tablet 3    HYDROcodone-acetaminophen (Norco) 5-325 mg tablet Take 1 tablet by mouth every 4 hours if needed for severe pain (7 - 10). 120 tablet 0    lactobacillus acidophilus capsule Take 1 capsule by mouth once daily. With breakfast      levothyroxine (Synthroid, Levoxyl) 50 mcg tablet       Livalo 4 mg tablet       losartan (Cozaar) 50 mg tablet Take 1 tablet (50 mg) by mouth once daily.      metoprolol tartrate (Lopressor) 25 mg tablet TAKE 1 TABLET BY MOUTH TWICE  DAILY 180 tablet 3    multivitamin capsule Take by mouth.      nitroglycerin (Nitrostat) 0.4 mg SL tablet Place 1 tablet (0.4 mg) under the tongue every 5 minutes if needed for chest pain. Under the tongue as needed for chest pain 25 tablet 2    omega-3s-dha-epa-fish oil 720-1,200 mg capsule,delayed release(DR/EC) Take by mouth.      potassium chloride CR (K-Tab) 20 mEq ER tablet  Take by mouth.      psyllium (Metamucil) powder Take by mouth.      vit A/vit C/vit E/zinc/copper (PRESERVISION AREDS ORAL) 1 caps, ORAL, BID, Refill(s) 0, Date: 4/5/24 4:20:00 PM EDT      vit C/zinc citrate/elderberry (SAMBUCUS ELDERBERRY ORAL) Take by mouth.      diphenoxylate-atropine (Lomotil) 2.5-0.025 mg tablet Take 1 tablet by mouth once daily. With additional tablet in evening if needed (Patient not taking: Reported on 11/20/2024) 30 tablet 2    primidone (Mysoline) 50 mg tablet Take 1 tablet (50 mg) by mouth once daily. (Patient not taking: Reported on 11/20/2024) 90 tablet 3     No current facility-administered medications for this visit.       Objective     Hospital Outpatient Visit on 10/30/2024   Component Date Value Ref Range Status    AV pk gabriela 10/30/2024 4.03  m/s Final    AV mn grad 10/30/2024 31.4  mmHg Final    LVOT diam 10/30/2024 1.99  cm Final    LA vol index A/L 10/30/2024 27.5  ml/m2 Final    Tricuspid annular plane systolic e* 10/30/2024 1.9  cm Final    LV EF 10/30/2024 63  % Final    RV free wall pk S' 10/30/2024 0.10  cm/s Final    LVIDd 10/30/2024 5.27  cm Final    RVSP 10/30/2024 15.3  mmHg Final    Aortic Valve Area by Continuity of* 10/30/2024 0.78  cm2 Final    Aortic Valve Area by Continuity of* 10/30/2024 0.67  cm2 Final    AV pk grad 10/30/2024 64.9  mmHg Final    LV A4C EF 10/30/2024 62.0   Final   Office Visit on 10/30/2024   Component Date Value Ref Range Status    Ventricular Rate 10/30/2024 62  BPM Final    Atrial Rate 10/30/2024 62  BPM Final    MN Interval 10/30/2024 150  ms Final    QRS Duration 10/30/2024 136  ms Final    QT Interval 10/30/2024 440  ms Final    QTC Calculation(Bazett) 10/30/2024 446  ms Final    P Axis 10/30/2024 34  degrees Final    R Axis 10/30/2024 -19  degrees Final    T Axis 10/30/2024 11  degrees Final    QRS Count 10/30/2024 10  beats Final    Q Onset 10/30/2024 225  ms Final    P Onset 10/30/2024 150  ms Final    P Offset 10/30/2024 208  ms Final     T Offset 10/30/2024 445  ms Final    QTC Fredericia 10/30/2024 445  ms Final   Lab on 08/07/2024   Component Date Value Ref Range Status    Amphetamine Screen, Urine 08/07/2024 Presumptive Negative  Presumptive Negative Final    Barbiturate Screen, Urine 08/07/2024 Presumptive Negative  Presumptive Negative Final    Benzodiazepines Screen, Urine 08/07/2024 Presumptive Negative  Presumptive Negative Final    Cannabinoid Screen, Urine 08/07/2024 Presumptive Negative  Presumptive Negative Final    Cocaine Metabolite Screen, Urine 08/07/2024 Presumptive Negative  Presumptive Negative Final    Fentanyl Screen, Urine 08/07/2024 Presumptive Negative  Presumptive Negative Final    Opiate Screen, Urine 08/07/2024 Presumptive Negative  Presumptive Negative Final    Oxycodone Screen, Urine 08/07/2024 Presumptive Negative  Presumptive Negative Final    PCP Screen, Urine 08/07/2024 Presumptive Negative  Presumptive Negative Final    Methadone Screen, Urine 08/07/2024 Presumptive Negative  Presumptive Negative Final   Orders Only on 07/26/2024   Component Date Value Ref Range Status    [facility] MPV 07/26/2024 7.4  7.4 - 10.4 fL Final    [facility] Instr WBC ND 07/26/2024 7.5   Final    [facility] HCT 07/26/2024 41.2  41.0 - 52.0 % Final    [facility] Platelet 07/26/2024 180  150 - 450 x10 Final    [facility] RBC 07/26/2024 4.28 (L)  4.70 - 6.10 x10 Final    [facility] WBC 07/26/2024 7.5  4.5 - 11.0 x10 Final    [facility] RDW 07/26/2024 13.5  11.5 - 14.5 % Final    [facility] MCHC 07/26/2024 34.1  32.0 - 37.0 g/dL Final    [facility] HGB 07/26/2024 14.0  13.5 - 17.5 g/dL Final    [facility] MCH 07/26/2024 32.8  27.0 - 34.0 pg Final    [facility] MCV 07/26/2024 96.1  80.0 - 100.0 fL Final    [facility] Calculated Osmolality 07/26/2024 286  275 - 295 mOsm/kg Final    [facility] GPT 07/26/2024 31  10 - 49 unit/L Final    [facility] Glucose 07/26/2024 173 (H)  74 - 106 mg/dL Final    [facility] ALB 07/26/2024 4.2  3.4 - 5.0 g/dL  Final    [facility] Na 07/26/2024 140  135 - 145 mmol/L Final    [facility] GOT 07/26/2024 21  15 - 37 unit/L Final    [facility] Glomerular Filtration R* 07/26/2024 >60  mL/min/1.73m? Final    [facility] BUN/Creat Ratio 07/26/2024 16.4   Final    [facility] CO2, venous 07/26/2024 28.0  20.0 - 31.0 mmol/L Final    [facility] BUN 07/26/2024 18  9 - 23 mg/dL Final    [facility] A/G Ratio 07/26/2024 1.4   Final    [facility] Total Protein 07/26/2024 7.1  5.7 - 8.2 g/dL Final    [facility] Calcium 07/26/2024 9.9  8.7 - 10.4 mg/dL Final    [facility] Chloride 07/26/2024 105  98 - 107 mmol/L Final    [facility] Alk Phos 07/26/2024 39 (L)  45 - 117 unit/L Final    [facility] Bilirubin, Total 07/26/2024 0.90  0.30 - 1.20 mg/dL Final    [facility] Globulin 07/26/2024 2.9  g/dL Final    [facility] GFR AA 07/26/2024 >60   Final    [facility] Creatinine 07/26/2024 1.1  0.6 - 1.1 mg/dL Final    [facility] K 07/26/2024 3.6  3.5 - 5.1 mmol/L Final    [facility] Total Chol/HDL Chol Rat* 07/26/2024 3.8   Final    [facility] Triglycerides 07/26/2024 393 (H)  30 - 150 mg/dL Final    [facility] Cholesterol 07/26/2024 145  100 - 200 mg/dL Final    [facility] Calculated LDL [place]ol[place]* 07/26/2024 28 (L)  60 - 130 mg/dL Final    [facility] HDL Cholesterol 07/26/2024 38 (L)  40 - 60 mg/dL Final    [facility] TSH 07/26/2024 1.91  0.55 - 4.78 uIU/ml Final    [facility] HGB A1C 07/26/2024 5.9  % Final       Radiology: Reviewed imaging in powerchart.  No results found.    Family History   Problem Relation Name Age of Onset    Stroke Mother      Heart failure Mother      Other (hepatic cirrhosis) Father      Parkinsonism Brother       Social History     Socioeconomic History    Marital status:    Tobacco Use    Smoking status: Never    Smokeless tobacco: Never   Substance and Sexual Activity    Alcohol use: Never    Drug use: Never     Past Medical History:   Diagnosis Date    Abnormal result of other cardiovascular function study      Abnormal nuclear stress test    Acute ischemic heart disease, unspecified     ACS (acute coronary syndrome)    Atherosclerotic heart disease of native coronary artery without angina pectoris     Arteriosclerotic coronary artery disease    Chronic sinusitis, unspecified     Chronic congestion of paranasal sinus    Coronary angioplasty status     History of percutaneous coronary intervention    Encounter for preprocedural cardiovascular examination     Pre-operative cardiovascular examination    Encounter for screening for malignant neoplasm of prostate     Screening PSA (prostate specific antigen)    Lyme disease, unspecified 12/02/2013    Lyme disease    Other cervical disc displacement, unspecified cervical region     Herniated cervical disc without myelopathy    Other specified postprocedural states     History of carpal tunnel surgery    Other systemic sclerosis     Cutaneous scleroderma    Personal history of malignant neoplasm, unspecified     History of malignant neoplasm    Personal history of other diseases of the circulatory system     History of hypertension    Personal history of other diseases of the circulatory system     History of abnormal electrocardiography    Personal history of other diseases of the digestive system     History of gastroesophageal reflux (GERD)    Personal history of other diseases of the musculoskeletal system and connective tissue     History of low back pain    Personal history of other diseases of the musculoskeletal system and connective tissue     History of spinal stenosis    Personal history of other diseases of the musculoskeletal system and connective tissue     History of arthritis    Personal history of other endocrine, nutritional and metabolic disease     History of hypothyroidism    Personal history of other endocrine, nutritional and metabolic disease     History of hyperlipidemia    Personal history of other endocrine, nutritional and metabolic disease     History  of type 2 diabetes mellitus    Personal history of other endocrine, nutritional and metabolic disease     History of hypoglycemia    Personal history of other specified conditions     History of chest pain    Polyp of stomach and duodenum     Gastric polyp     Past Surgical History:   Procedure Laterality Date    CORONARY ANGIOPLASTY WITH STENT PLACEMENT  07/27/2018    Cath Placement Of Stent 1    HAND SURGERY  07/27/2018    Hand Surgery                                                                                                                                                          OTHER SURGICAL HISTORY  07/27/2018    Surgery    OTHER SURGICAL HISTORY  07/27/2018    Total Hip Replacement Left       Charting was completed using voice recognition technology and may include unintended errors.

## 2024-11-21 LAB
NON-UH HIE CALCULATED LDL CHOLESTEROL: 74 MG/DL (ref 60–130)
NON-UH HIE CHOLESTEROL: 179 MG/DL (ref 100–200)
NON-UH HIE HDL CHOLESTEROL: 37 MG/DL (ref 40–60)
NON-UH HIE TOTAL CHOL/HDL CHOL RATIO: 4.8
NON-UH HIE TRIGLYCERIDES: 341 MG/DL (ref 30–150)

## 2024-11-27 ENCOUNTER — TELEPHONE (OUTPATIENT)
Dept: PRIMARY CARE | Facility: CLINIC | Age: 84
End: 2024-11-27
Payer: MEDICARE

## 2024-11-27 NOTE — TELEPHONE ENCOUNTER
----- Message from Baudilio Gonzalez sent at 11/26/2024  4:50 PM EST -----  triglycerides are elevated, please limit simple carbohydrates and continue to do lifestyle modifications including dieting and exercising

## 2024-12-10 ENCOUNTER — APPOINTMENT (OUTPATIENT)
Dept: PRIMARY CARE | Facility: CLINIC | Age: 84
End: 2024-12-10
Payer: MEDICARE

## 2024-12-10 ENCOUNTER — LAB (OUTPATIENT)
Dept: LAB | Facility: LAB | Age: 84
End: 2024-12-10
Payer: MEDICARE

## 2024-12-10 VITALS
HEIGHT: 68 IN | BODY MASS INDEX: 28.49 KG/M2 | SYSTOLIC BLOOD PRESSURE: 138 MMHG | OXYGEN SATURATION: 96 % | DIASTOLIC BLOOD PRESSURE: 70 MMHG | HEART RATE: 76 BPM | WEIGHT: 188 LBS

## 2024-12-10 DIAGNOSIS — Z79.899 MEDICATION MANAGEMENT: ICD-10-CM

## 2024-12-10 DIAGNOSIS — Z12.5 SPECIAL SCREENING, PROSTATE CANCER: ICD-10-CM

## 2024-12-10 DIAGNOSIS — Z79.899 MEDICATION MANAGEMENT: Primary | ICD-10-CM

## 2024-12-10 DIAGNOSIS — R52 UNCONTROLLED PAIN: ICD-10-CM

## 2024-12-10 LAB
AMPHETAMINES UR QL SCN: ABNORMAL
BARBITURATES UR QL SCN: ABNORMAL
BENZODIAZ UR QL SCN: ABNORMAL
BZE UR QL SCN: ABNORMAL
CANNABINOIDS UR QL SCN: ABNORMAL
FENTANYL+NORFENTANYL UR QL SCN: ABNORMAL
METHADONE UR QL SCN: ABNORMAL
OPIATES UR QL SCN: ABNORMAL
OXYCODONE+OXYMORPHONE UR QL SCN: ABNORMAL
PCP UR QL SCN: ABNORMAL
PSA SERPL-MCNC: 0.27 NG/ML

## 2024-12-10 PROCEDURE — 1159F MED LIST DOCD IN RCRD: CPT | Performed by: INTERNAL MEDICINE

## 2024-12-10 PROCEDURE — 99214 OFFICE O/P EST MOD 30 MIN: CPT | Performed by: INTERNAL MEDICINE

## 2024-12-10 PROCEDURE — 80365 DRUG SCREENING OXYCODONE: CPT

## 2024-12-10 PROCEDURE — 3078F DIAST BP <80 MM HG: CPT | Performed by: INTERNAL MEDICINE

## 2024-12-10 PROCEDURE — 1036F TOBACCO NON-USER: CPT | Performed by: INTERNAL MEDICINE

## 2024-12-10 PROCEDURE — 80361 OPIATES 1 OR MORE: CPT

## 2024-12-10 PROCEDURE — 36415 COLL VENOUS BLD VENIPUNCTURE: CPT

## 2024-12-10 PROCEDURE — 1123F ACP DISCUSS/DSCN MKR DOCD: CPT | Performed by: INTERNAL MEDICINE

## 2024-12-10 PROCEDURE — G0103 PSA SCREENING: HCPCS

## 2024-12-10 PROCEDURE — 80307 DRUG TEST PRSMV CHEM ANLYZR: CPT

## 2024-12-10 PROCEDURE — 3075F SYST BP GE 130 - 139MM HG: CPT | Performed by: INTERNAL MEDICINE

## 2024-12-10 RX ORDER — HYDROCODONE BITARTRATE AND ACETAMINOPHEN 5; 325 MG/1; MG/1
1 TABLET ORAL EVERY 4 HOURS PRN
Qty: 120 TABLET | Refills: 0 | Status: SHIPPED | OUTPATIENT
Start: 2024-12-10 | End: 2025-01-09

## 2024-12-10 NOTE — PROGRESS NOTES
Subjective   Patient ID: Curtis Brown is a 83 y.o. 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   Following with urology Dr Saldaña. Surveillance only at this time.     Hx spinal stenosis following with dr thorpe and dr jones s/p three spinal injections for steroids.    Patient may not require hydrocodone after he has a steroid injection to spine.   Urine drug screen 12/10/24 ordered  Hydrocodone 5-325mg bid #120, needs to come in on December 9 , 2024  Controlled substance agreement signed 4/15/2024     lymphocytic colitis diarrhea: stable on diphenoxylate  #30    Not due yet    Hx SEPSIS ACUTE PYELONEPHROSIS 10/2023: resolved at this time    Slight anemia hgb trending 11-12s lately but 1 year ago his hgb was 15. Iron/b12/folte wnl.     Resting tremor:   primidone (made him sick to his stomach)  Will try gabapentin 300mg at bedtime 11/20/24    PSA elevated 6s (October 2023, not normal)  PSA wnl now will  follow up to urology     pericardial effusion: Patient no longer on colchicine and has follow up with dr montes     Hypothyroid: tsh wnl      htn: stable, continue metoprolol.  improved, continue on   hctz to 25mg daily ,   amlodipine 10mg daily.   Losartan 50mg daily       Metoprolol 25mg bid      hld: stable, continue statin. LDL < 70. Patient wants to change Lipitor to Crestor per patient's request. Patient went to Dr Rodrigues and now is on livalo/pitavastatin, but its too expensive. He is thinking about changing his statin back to crestor      Dry macular degeneration: stable following with Dr Andrew @ [facility]     dm2: stable, 6s    Patient follows with dr rodrigues   5 mg tablet Amaryl daily . Watch for low sugars.      PVD: stable, continue statin and aspirin      diastolic CHF and severe aortic stenosis: stable, patient is very active. stable. getting echo  with dr montes twice a year      BPH: patient is taking finasteride. He denies any complaints and he needs a refill       patient does not have children. needs to consider POA outs       Colonoscopy may 2021 with dr jenkins           hpi  male cad s/p stent 11/2013, PVD, htn, hld comes for the following    spinal stenosis: pain intermittent, in gluteal region. Going down both legs. Feels like hips are in a vice. Hurts to move his legs. He is seeing spinal Dr Cat who recommended dr jones. Patient is not doing well and would like to get hydrocodone again. He cannot go grocery shopping without significant pain. He did fail steroid injections to his spine with dr jones. When he gets up in the morning his pain is the worse and that is when he needs pain medications.      Other medications, see below  HX PERICARDITIS AND PERICARDIAL EFFUSION sEPTEMBER 2022: this has since resolved. and he follows with cardiology regularly for this     Resting tremors: worsening in his hands and he is UNABLE TO  hold objects in his hands.     lymphocytic colitis (biopsy + May 18, 2021)diarrhea: patient is on diphenoxylate/atropine for his diarrhea on a regular basis. he was seen by Dr Jenkins (who has just recently retired). he is controlled on dipheoxylate-atropine. he does on occasion during the month require an additional dose. patient continues to do well on his current management      diastolic chf: Patient denies any chest pain, angina or exertional dyspnea. patient denies any swelling to lower extremities. Patient follows with cardiology on a regular basis      Severe  aortic valve stenosis      Diabetes Type 2: patient denies any low blood sugars. Patient is following with opthalmology on a yearly basis. Patient is taking glimepiride a1c 5s typically. patient following with dr christina.      hypothyroid: patient denies any hypo or hypothyroid symptoms. patient denies any palpitations, diarrhea or constipation     HLD: Patient  denies any muscle aches and is tolerating statin therapy     pvd: denies any previous strokes, no headaches. no claudication        social: patient denies any smoking, drug or alcohol abuse     surgical history: left hip replacement 11/2017     COLONOSCOPY MAY 2021 AT [facility] with 5 year follow up      HOSPITALIZATIONS  @ [facility] from April 5, 2024 to April 7, 2024 as he had left-sided flank pain that radiated down to his left groin into his testicles.  He says this woke him up out of his early sleep he decided come emergency department for further assessment.  CT scan of the abdomen pelvis shows a left renal mass which is a known mass.  However there is some mild perinephric stranding as well.  Urinalysis was negative for infection labs were unremarkable.  His pain was much better controlled he was seen by urology who did not see any evidence of urological cause.  Heme-onc saw the patient and agreed about the left renal mass being a longstanding issue without any further intervention.    @[facility] from October 30, 2023 to November 3, 2023 patient's was hospitalized for subjective fevers and sweats.  He was found to have a UTI and left pyelonephritis.  Patient was treated for E. coli growing in his urine sensitive to most antibiotics except ampicillin and Unasyn.  Patient was recommended to continue on Cipro 500 mg twice a day for an additional 10 days posthospitalization.  He is noted to have a left solid renal cyst.  An MRI of the abdomen was done showing pyelonephritis but also a 1.6 cm left renal lesion which is characteristic of a hemorrhagic cyst.  Patient's losartan was still on hold upon discharge.        September 3 to September 7, 2022 @[facility] . Patient came in originally to [facility] for shortness of breath and chest pain. Patient was found to have acute pericarditis along with moderate-sized pericardial effusion. He was placed on colchicine twice a  day and his symptoms dramatically improved. Cardiology as well as endocrinology was following him and he was discharged on colchicine.     OARRS:  No data recorded  I have personally reviewed the OARRS report for Curtis Brown. I have considered the risks of abuse, dependence, addiction and diversion    Is the patient prescribed a combination of a benzodiazepine and opioid?  Yes, I feel it is clincially indicated to continue the medication and have discussed with the patient risks/benefits/alternatives.    Last Urine Drug Screen / ordered today: Yes  Recent Results (from the past 8760 hours)   Drug Screen, Urine With Reflex to Confirmation    Collection Time: 08/07/24 12:16 PM   Result Value Ref Range    Amphetamine Screen, Urine Presumptive Negative Presumptive Negative    Barbiturate Screen, Urine Presumptive Negative Presumptive Negative    Benzodiazepines Screen, Urine Presumptive Negative Presumptive Negative    Cannabinoid Screen, Urine Presumptive Negative Presumptive Negative    Cocaine Metabolite Screen, Urine Presumptive Negative Presumptive Negative    Fentanyl Screen, Urine Presumptive Negative Presumptive Negative    Opiate Screen, Urine Presumptive Negative Presumptive Negative    Oxycodone Screen, Urine Presumptive Negative Presumptive Negative    PCP Screen, Urine Presumptive Negative Presumptive Negative    Methadone Screen, Urine Presumptive Negative Presumptive Negative   Opiate Confirmation, Urine    Collection Time: 04/15/24  9:53 AM   Result Value Ref Range    6-Acetylmorphine <25 <25 ng/mL    Codeine <50 <50 ng/mL    Hydrocodone 202 (H) <25 ng/mL    Hydromorphone 183 (H) <25 ng/mL    Morphine  <50 <50 ng/mL    Norhydrocodone 262 (H) <25 ng/mL    Noroxycodone <25 <25 ng/mL    Oxycodone <25 <25 ng/mL    Oxymorphone <25 <25 ng/mL       Results are as expected.          Reviewed Controlled Substance Agreement including but not limited to the benefits, risks, and alternatives to treatment with a  "Controlled Substance medication(s).    Antidiarrheal:   What is the patient's goal of therapy?  Improve diarrhea   Is this being achieved with current treatment? yes  Is the patient currently prescribed an opioid, and/or benzodiazepine?   Yes, I feel it is clincially indicated to continue the medication and have discussed with the patient risks/benefits/alternatives.    Activities of Daily Living:   Is your overall impression that this patient is benefiting (symptom reduction outweighs side effects) from antidiarrheal therapy? No     1. Physical Functioning: Better  2. Family Relationship: Better  3. Social Relationship: Better  4. Mood: Better  5. Sleep Patterns: Better  6. Overall Function: Better      Visit Vitals  /70 (BP Location: Right arm, Patient Position: Sitting, BP Cuff Size: Adult)   Pulse 76   Ht 1.727 m (5' 8\")   Wt 85.3 kg (188 lb)   SpO2 96%   BMI 28.59 kg/m²   Smoking Status Never   BSA 2.02 m²     PHYSICAL EXAM   General appearance: Alert and in no acute distress. speech is clear and coherent  No head trauma  Neck: no carotid bruits or thyromegaly. no lymphadenopathy   Respiratory : No respiratory distress, normal respiratory rhythm and effort. Clear bilateral breath sounds. No wheezing or rhonchi.   Cardiovascular: heart rate regular, S1, S2. no murmurs. no Lower extremity edema  Skin inspection: Normal skin color and pigmentation, normal skin turgor and no visible rash, induration, or cellulitis  MSK: 5/5 strength upper and lower extremities without gait abnormalities. no loss of muscle mass . Left lower flank pain. No SI joint paint or central spine pain.   Neuro: 2-12 CN grossly intact.  no slurred speech. no lateralizing deficit  Psychiatric Orientation: Oriented to person, place, and time. no depression, homicidal or suicidal thoughts, normal affect     REVIEW OF SYSTEMS     Constitutional: not feeling tired and no fever, chills or sweats. Denies weight loss  no " headache  Cardiovascular: no exertional chest pain, no palpitations, no lower extremity edema   Lungs: Denies shortness of breath, exertional dyspnea, wheezing  Gastrointestinal: no change in bowel habits, no diarrhea, no nausea, no vomiting and no abdominal pain.   Musculoskeletal: no myalgias, no muscle weakness and no limb swelling.    Neurological: no headaches, no seizures, no numbness, no lateralizing deficits and no fainting.   Psychiatric: no depression and no anxiety.   Urine: denies polyuria, hematuria, dysuria      Allergies   Allergen Reactions    Morphine Unknown    Penicillins Unknown    Valacyclovir Rash       Current Outpatient Medications   Medication Sig Dispense Refill    Accu-True Officek Pippa Plus test strp strip USE TO TEST ONCE DAILY.      Accu-Chek Fastclix Lancet Drum misc 1 Lancet early in the morning..      amLODIPine (Norvasc) 10 mg tablet TAKE 1 TABLET BY MOUTH ONCE  DAILY 90 tablet 3    aspirin 81 mg EC tablet Take 1 tablet (81 mg) by mouth once daily.      clindamycin (Cleocin) 300 mg capsule Take 1 capsule (300 mg) by mouth. 1 HOUR BEFORE PROCEDURE AND 6 HOURS AFTER PROCEDURE AS DIRECTED      coenzyme Q-10 300 mg capsule capsule Take by mouth.      cyanocobalamin (Vitamin B-12) 100 mcg tablet Take by mouth.      diphenoxylate-atropine (Lomotil) 2.5-0.025 mg tablet Take 1 tablet by mouth once daily. With additional tablet in evening if needed 30 tablet 2    famotidine (Pepcid) 40 mg tablet TAKE 1 TABLET BY MOUTH TWICE  DAILY 180 tablet 3    fenofibrate (Tricor) 48 mg tablet Take 1 tablet (48 mg) by mouth once daily. 90 tablet 2    finasteride (Proscar) 5 mg tablet Take 1 tablet (5 mg) by mouth once daily. (Patient taking differently: Take 1.25 mg by mouth once daily.) 90 tablet 3    glimepiride (Amaryl) 1 mg tablet Take by mouth.      glimepiride (Amaryl) 2 mg tablet Take 1 tablet (2 mg) by mouth 2 times a day.      hydroCHLOROthiazide (HYDRODiuril) 25 mg tablet Take 1 tablet (25 mg) by mouth  once daily. 90 tablet 3    lactobacillus acidophilus capsule Take 1 capsule by mouth once daily. With breakfast      levothyroxine (Synthroid, Levoxyl) 50 mcg tablet       Livalo 4 mg tablet       losartan (Cozaar) 50 mg tablet Take 1 tablet (50 mg) by mouth once daily.      metoprolol tartrate (Lopressor) 25 mg tablet TAKE 1 TABLET BY MOUTH TWICE  DAILY 180 tablet 3    multivitamin capsule Take by mouth.      nitroglycerin (Nitrostat) 0.4 mg SL tablet Place 1 tablet (0.4 mg) under the tongue every 5 minutes if needed for chest pain. Under the tongue as needed for chest pain 25 tablet 2    omega-3s-dha-epa-fish oil 720-1,200 mg capsule,delayed release(DR/EC) Take by mouth.      potassium chloride CR (K-Tab) 20 mEq ER tablet Take by mouth.      psyllium (Metamucil) powder Take by mouth.      vit A/vit C/vit E/zinc/copper (PRESERVISION AREDS ORAL) 1 caps, ORAL, BID, Refill(s) 0, Date: 4/5/24 4:20:00 PM EDT      vit C/zinc citrate/elderberry (SAMBUCUS ELDERBERRY ORAL) Take by mouth.      gabapentin (Neurontin) 300 mg capsule Take 1 capsule (300 mg) by mouth once daily at bedtime. (Patient not taking: Reported on 12/10/2024) 90 capsule 3    primidone (Mysoline) 50 mg tablet Take 1 tablet (50 mg) by mouth once daily. (Patient not taking: Reported on 8/7/2024) 90 tablet 3     No current facility-administered medications for this visit.       Objective     Orders Only on 11/21/2024   Component Date Value Ref Range Status    [facility] H[facility] Total Chol/HDL Chol Rat* 11/21/2024 4.8   Final    [facility] HIE Triglycerides 11/21/2024 341 (H)  30 - 150 mg/dL Final    [facility] HIE Cholesterol 11/21/2024 179  100 - 200 mg/dL Final    [facility] HIE Calculated LDL Choleste* 11/21/2024 74  60 - 130 mg/dL Final    [facility] HIE HDL Cholesterol 11/21/2024 37 (L)  40 - 60 mg/dL Final   Hospital Outpatient Visit on 10/30/2024   Component Date Value Ref Range Status    AV pk gabriela 10/30/2024 4.03  m/s Final    AV mn grad 10/30/2024 31.4  mmHg Final    LVOT  diam 10/30/2024 1.99  cm Final    LA vol index A/L 10/30/2024 27.5  ml/m2 Final    Tricuspid annular plane systolic e* 10/30/2024 1.9  cm Final    LV EF 10/30/2024 63  % Final    RV free wall pk S' 10/30/2024 0.10  cm/s Final    LVIDd 10/30/2024 5.27  cm Final    RVSP 10/30/2024 15.3  mmHg Final    Aortic Valve Area by Continuity of* 10/30/2024 0.78  cm2 Final    Aortic Valve Area by Continuity of* 10/30/2024 0.67  cm2 Final    AV pk grad 10/30/2024 64.9  mmHg Final    LV A4C EF 10/30/2024 62.0   Final   Office Visit on 10/30/2024   Component Date Value Ref Range Status    Ventricular Rate 10/30/2024 62  BPM Final    Atrial Rate 10/30/2024 62  BPM Final    AR Interval 10/30/2024 150  ms Final    QRS Duration 10/30/2024 136  ms Final    QT Interval 10/30/2024 440  ms Final    QTC Calculation(Bazett) 10/30/2024 446  ms Final    P Axis 10/30/2024 34  degrees Final    R Axis 10/30/2024 -19  degrees Final    T Axis 10/30/2024 11  degrees Final    QRS Count 10/30/2024 10  beats Final    Q Onset 10/30/2024 225  ms Final    P Onset 10/30/2024 150  ms Final    P Offset 10/30/2024 208  ms Final    T Offset 10/30/2024 445  ms Final    QTC Fredericia 10/30/2024 445  ms Final       Radiology: Reviewed imaging in powerchart.  No results found.    Family History   Problem Relation Name Age of Onset    Stroke Mother      Heart failure Mother      Other (hepatic cirrhosis) Father      Parkinsonism Brother       Social History     Socioeconomic History    Marital status:    Tobacco Use    Smoking status: Never    Smokeless tobacco: Never   Substance and Sexual Activity    Alcohol use: Never    Drug use: Never     Past Medical History:   Diagnosis Date    Abnormal result of other cardiovascular function study     Abnormal nuclear stress test    Acute ischemic heart disease, unspecified     ACS (acute coronary syndrome)    Atherosclerotic heart disease of native coronary artery without angina pectoris     Arteriosclerotic  coronary artery disease    Chronic sinusitis, unspecified     Chronic congestion of paranasal sinus    Coronary angioplasty status     History of percutaneous coronary intervention    Encounter for preprocedural cardiovascular examination     Pre-operative cardiovascular examination    Encounter for screening for malignant neoplasm of prostate     Screening PSA (prostate specific antigen)    Lyme disease, unspecified 12/02/2013    Lyme disease    Other cervical disc displacement, unspecified cervical region     Herniated cervical disc without myelopathy    Other specified postprocedural states     History of carpal tunnel surgery    Other systemic sclerosis     Cutaneous scleroderma    Personal history of malignant neoplasm, unspecified     History of malignant neoplasm    Personal history of other diseases of the circulatory system     History of hypertension    Personal history of other diseases of the circulatory system     History of abnormal electrocardiography    Personal history of other diseases of the digestive system     History of gastroesophageal reflux (GERD)    Personal history of other diseases of the musculoskeletal system and connective tissue     History of low back pain    Personal history of other diseases of the musculoskeletal system and connective tissue     History of spinal stenosis    Personal history of other diseases of the musculoskeletal system and connective tissue     History of arthritis    Personal history of other endocrine, nutritional and metabolic disease     History of hypothyroidism    Personal history of other endocrine, nutritional and metabolic disease     History of hyperlipidemia    Personal history of other endocrine, nutritional and metabolic disease     History of type 2 diabetes mellitus    Personal history of other endocrine, nutritional and metabolic disease     History of hypoglycemia    Personal history of other specified conditions     History of chest pain     "Polyp of stomach and duodenum     Gastric polyp     Past Surgical History:   Procedure Laterality Date    CORONARY ANGIOPLASTY WITH STENT PLACEMENT  07/27/2018    Cath Placement Of Stent 1    HAND SURGERY  07/27/2018    Hand Surgery                                                                                                                                                          OTHER SURGICAL HISTORY  07/27/2018    Surgery    OTHER SURGICAL HISTORY  07/27/2018    Total Hip Replacement Left       Charting was completed using voice recognition technology and may include unintended errors.     Subjective   Patient ID: Curtis Brown is a 83 y.o. male who presents for the following           Visit Vitals  /70 (BP Location: Right arm, Patient Position: Sitting, BP Cuff Size: Adult)   Pulse 76   Ht 1.727 m (5' 8\")   Wt 85.3 kg (188 lb)   SpO2 96%   BMI 28.59 kg/m²   Smoking Status Never   BSA 2.02 m²     PHYSICAL EXAM       REVIEW OF SYSTEMS       Allergies   Allergen Reactions    Morphine Unknown    Penicillins Unknown    Valacyclovir Rash       Current Outpatient Medications   Medication Sig Dispense Refill    Accu-Chek Pippa Plus test strp strip USE TO TEST ONCE DAILY.      Accu-Chek Fastclix Lancet Drum misc 1 Lancet early in the morning..      amLODIPine (Norvasc) 10 mg tablet TAKE 1 TABLET BY MOUTH ONCE  DAILY 90 tablet 3    aspirin 81 mg EC tablet Take 1 tablet (81 mg) by mouth once daily.      clindamycin (Cleocin) 300 mg capsule Take 1 capsule (300 mg) by mouth. 1 HOUR BEFORE PROCEDURE AND 6 HOURS AFTER PROCEDURE AS DIRECTED      coenzyme Q-10 300 mg capsule capsule Take by mouth.      cyanocobalamin (Vitamin B-12) 100 mcg tablet Take by mouth.      diphenoxylate-atropine (Lomotil) 2.5-0.025 mg tablet Take 1 tablet by mouth once daily. With additional tablet in evening if needed 30 tablet 2    famotidine (Pepcid) 40 mg tablet TAKE 1 TABLET BY MOUTH TWICE  DAILY 180 tablet 3    fenofibrate (Tricor) 48 " mg tablet Take 1 tablet (48 mg) by mouth once daily. 90 tablet 2    finasteride (Proscar) 5 mg tablet Take 1 tablet (5 mg) by mouth once daily. (Patient taking differently: Take 1.25 mg by mouth once daily.) 90 tablet 3    glimepiride (Amaryl) 1 mg tablet Take by mouth.      glimepiride (Amaryl) 2 mg tablet Take 1 tablet (2 mg) by mouth 2 times a day.      hydroCHLOROthiazide (HYDRODiuril) 25 mg tablet Take 1 tablet (25 mg) by mouth once daily. 90 tablet 3    lactobacillus acidophilus capsule Take 1 capsule by mouth once daily. With breakfast      levothyroxine (Synthroid, Levoxyl) 50 mcg tablet       Livalo 4 mg tablet       losartan (Cozaar) 50 mg tablet Take 1 tablet (50 mg) by mouth once daily.      metoprolol tartrate (Lopressor) 25 mg tablet TAKE 1 TABLET BY MOUTH TWICE  DAILY 180 tablet 3    multivitamin capsule Take by mouth.      nitroglycerin (Nitrostat) 0.4 mg SL tablet Place 1 tablet (0.4 mg) under the tongue every 5 minutes if needed for chest pain. Under the tongue as needed for chest pain 25 tablet 2    omega-3s-dha-epa-fish oil 720-1,200 mg capsule,delayed release(DR/EC) Take by mouth.      potassium chloride CR (K-Tab) 20 mEq ER tablet Take by mouth.      psyllium (Metamucil) powder Take by mouth.      vit A/vit C/vit E/zinc/copper (PRESERVISION AREDS ORAL) 1 caps, ORAL, BID, Refill(s) 0, Date: 4/5/24 4:20:00 PM EDT      vit C/zinc citrate/elderberry (SAMBUCUS ELDERBERRY ORAL) Take by mouth.      gabapentin (Neurontin) 300 mg capsule Take 1 capsule (300 mg) by mouth once daily at bedtime. (Patient not taking: Reported on 12/10/2024) 90 capsule 3    primidone (Mysoline) 50 mg tablet Take 1 tablet (50 mg) by mouth once daily. (Patient not taking: Reported on 8/7/2024) 90 tablet 3     No current facility-administered medications for this visit.       Objective     Orders Only on 11/21/2024   Component Date Value Ref Range Status    [facility] H[facility] Total Chol/HDL Chol Rat* 11/21/2024 4.8   Final    [facility]  HIE Triglycerides 11/21/2024 341 (H)  30 - 150 mg/dL Final    [facility] HIE Cholesterol 11/21/2024 179  100 - 200 mg/dL Final    [facility] HIE Calculated LDL Choleste* 11/21/2024 74  60 - 130 mg/dL Final    [facility] HIE HDL Cholesterol 11/21/2024 37 (L)  40 - 60 mg/dL Final   Hospital Outpatient Visit on 10/30/2024   Component Date Value Ref Range Status    AV pk gabriela 10/30/2024 4.03  m/s Final    AV mn grad 10/30/2024 31.4  mmHg Final    LVOT diam 10/30/2024 1.99  cm Final    LA vol index A/L 10/30/2024 27.5  ml/m2 Final    Tricuspid annular plane systolic e* 10/30/2024 1.9  cm Final    LV EF 10/30/2024 63  % Final    RV free wall pk S' 10/30/2024 0.10  cm/s Final    LVIDd 10/30/2024 5.27  cm Final    RVSP 10/30/2024 15.3  mmHg Final    Aortic Valve Area by Continuity of* 10/30/2024 0.78  cm2 Final    Aortic Valve Area by Continuity of* 10/30/2024 0.67  cm2 Final    AV pk grad 10/30/2024 64.9  mmHg Final    LV A4C EF 10/30/2024 62.0   Final   Office Visit on 10/30/2024   Component Date Value Ref Range Status    Ventricular Rate 10/30/2024 62  BPM Final    Atrial Rate 10/30/2024 62  BPM Final    IN Interval 10/30/2024 150  ms Final    QRS Duration 10/30/2024 136  ms Final    QT Interval 10/30/2024 440  ms Final    QTC Calculation(Bazett) 10/30/2024 446  ms Final    P Axis 10/30/2024 34  degrees Final    R Axis 10/30/2024 -19  degrees Final    T Axis 10/30/2024 11  degrees Final    QRS Count 10/30/2024 10  beats Final    Q Onset 10/30/2024 225  ms Final    P Onset 10/30/2024 150  ms Final    P Offset 10/30/2024 208  ms Final    T Offset 10/30/2024 445  ms Final    QTC Fredericia 10/30/2024 445  ms Final       Radiology: Reviewed imaging in powerchart.  No results found.    Family History   Problem Relation Name Age of Onset    Stroke Mother      Heart failure Mother      Other (hepatic cirrhosis) Father      Parkinsonism Brother       Social History     Socioeconomic History    Marital status:    Tobacco Use    Smoking  status: Never    Smokeless tobacco: Never   Substance and Sexual Activity    Alcohol use: Never    Drug use: Never     Past Medical History:   Diagnosis Date    Abnormal result of other cardiovascular function study     Abnormal nuclear stress test    Acute ischemic heart disease, unspecified     ACS (acute coronary syndrome)    Atherosclerotic heart disease of native coronary artery without angina pectoris     Arteriosclerotic coronary artery disease    Chronic sinusitis, unspecified     Chronic congestion of paranasal sinus    Coronary angioplasty status     History of percutaneous coronary intervention    Encounter for preprocedural cardiovascular examination     Pre-operative cardiovascular examination    Encounter for screening for malignant neoplasm of prostate     Screening PSA (prostate specific antigen)    Lyme disease, unspecified 12/02/2013    Lyme disease    Other cervical disc displacement, unspecified cervical region     Herniated cervical disc without myelopathy    Other specified postprocedural states     History of carpal tunnel surgery    Other systemic sclerosis     Cutaneous scleroderma    Personal history of malignant neoplasm, unspecified     History of malignant neoplasm    Personal history of other diseases of the circulatory system     History of hypertension    Personal history of other diseases of the circulatory system     History of abnormal electrocardiography    Personal history of other diseases of the digestive system     History of gastroesophageal reflux (GERD)    Personal history of other diseases of the musculoskeletal system and connective tissue     History of low back pain    Personal history of other diseases of the musculoskeletal system and connective tissue     History of spinal stenosis    Personal history of other diseases of the musculoskeletal system and connective tissue     History of arthritis    Personal history of other endocrine, nutritional and metabolic disease      History of hypothyroidism    Personal history of other endocrine, nutritional and metabolic disease     History of hyperlipidemia    Personal history of other endocrine, nutritional and metabolic disease     History of type 2 diabetes mellitus    Personal history of other endocrine, nutritional and metabolic disease     History of hypoglycemia    Personal history of other specified conditions     History of chest pain    Polyp of stomach and duodenum     Gastric polyp     Past Surgical History:   Procedure Laterality Date    CORONARY ANGIOPLASTY WITH STENT PLACEMENT  07/27/2018    Cath Placement Of Stent 1    HAND SURGERY  07/27/2018    Hand Surgery                                                                                                                                                          OTHER SURGICAL HISTORY  07/27/2018    Surgery    OTHER SURGICAL HISTORY  07/27/2018    Total Hip Replacement Left       Charting was completed using voice recognition technology and may include unintended errors.

## 2024-12-13 LAB
6MAM UR CFM-MCNC: <25 NG/ML
CODEINE UR CFM-MCNC: <50 NG/ML
HYDROCODONE CTO UR CFM-MCNC: 899 NG/ML
HYDROMORPHONE UR CFM-MCNC: 696 NG/ML
MORPHINE UR CFM-MCNC: <50 NG/ML
NORHYDROCODONE UR CFM-MCNC: >1000 NG/ML
NOROXYCODONE UR CFM-MCNC: <25 NG/ML
OXYCODONE UR CFM-MCNC: <25 NG/ML
OXYMORPHONE UR CFM-MCNC: <25 NG/ML

## 2025-01-06 ENCOUNTER — OFFICE VISIT (OUTPATIENT)
Facility: CLINIC | Age: 85
End: 2025-01-06
Payer: MEDICARE

## 2025-01-06 VITALS
HEIGHT: 68 IN | BODY MASS INDEX: 27.89 KG/M2 | RESPIRATION RATE: 16 BRPM | HEART RATE: 86 BPM | TEMPERATURE: 98 F | SYSTOLIC BLOOD PRESSURE: 138 MMHG | DIASTOLIC BLOOD PRESSURE: 82 MMHG | WEIGHT: 184 LBS

## 2025-01-06 DIAGNOSIS — M48.061 SPINAL STENOSIS OF LUMBAR REGION, UNSPECIFIED WHETHER NEUROGENIC CLAUDICATION PRESENT: ICD-10-CM

## 2025-01-06 DIAGNOSIS — M54.16 LUMBAR RADICULOPATHY, ACUTE: Primary | ICD-10-CM

## 2025-01-06 DIAGNOSIS — R79.1 ABNORMAL COAGULATION PROFILE: ICD-10-CM

## 2025-01-06 DIAGNOSIS — M54.16 LUMBAR RADICULOPATHY: Primary | ICD-10-CM

## 2025-01-06 PROCEDURE — 99215 OFFICE O/P EST HI 40 MIN: CPT | Performed by: STUDENT IN AN ORGANIZED HEALTH CARE EDUCATION/TRAINING PROGRAM

## 2025-01-06 RX ORDER — CELECOXIB 400 MG/1
400 CAPSULE ORAL ONCE
OUTPATIENT
Start: 2025-01-06 | End: 2025-01-06

## 2025-01-06 RX ORDER — TRANEXAMIC ACID 650 MG/1
1300 TABLET ORAL ONCE
OUTPATIENT
Start: 2025-01-06 | End: 2025-01-06

## 2025-01-06 RX ORDER — ACETAMINOPHEN 325 MG/1
975 TABLET ORAL ONCE
OUTPATIENT
Start: 2025-01-06 | End: 2025-01-06

## 2025-01-06 ASSESSMENT — PATIENT HEALTH QUESTIONNAIRE - PHQ9
5. POOR APPETITE OR OVEREATING: NOT AT ALL
6. FEELING BAD ABOUT YOURSELF - OR THAT YOU ARE A FAILURE OR HAVE LET YOURSELF OR YOUR FAMILY DOWN: NOT AT ALL
SUM OF ALL RESPONSES TO PHQ QUESTIONS 1-9: 5
4. FEELING TIRED OR HAVING LITTLE ENERGY: SEVERAL DAYS
1. LITTLE INTEREST OR PLEASURE IN DOING THINGS: NEARLY EVERY DAY
2. FEELING DOWN, DEPRESSED OR HOPELESS: NOT AT ALL
7. TROUBLE CONCENTRATING ON THINGS, SUCH AS READING THE NEWSPAPER OR WATCHING TELEVISION: NOT AT ALL
SUM OF ALL RESPONSES TO PHQ9 QUESTIONS 1 AND 2: 3
8. MOVING OR SPEAKING SO SLOWLY THAT OTHER PEOPLE COULD HAVE NOTICED. OR THE OPPOSITE, BEING SO FIGETY OR RESTLESS THAT YOU HAVE BEEN MOVING AROUND A LOT MORE THAN USUAL: NOT AT ALL
9. THOUGHTS THAT YOU WOULD BE BETTER OFF DEAD, OR OF HURTING YOURSELF: NOT AT ALL
3. TROUBLE FALLING OR STAYING ASLEEP OR SLEEPING TOO MUCH: SEVERAL DAYS

## 2025-01-06 ASSESSMENT — ENCOUNTER SYMPTOMS
OCCASIONAL FEELINGS OF UNSTEADINESS: 0
LOSS OF SENSATION IN FEET: 0
DEPRESSION: 0

## 2025-01-06 ASSESSMENT — PAIN SCALES - GENERAL: PAINLEVEL_OUTOF10: 10-WORST PAIN EVER

## 2025-01-06 NOTE — PROGRESS NOTES
[facility] [place]  Department of Neurological Surgery  New Patient Visit    History of Present Illness:  Curtis Brown is a 84 y.o. year old male who presents to the spine clinic with a few years of mechanical low back pain. The pain radiates down both hips and back of the legs in the L5 and S1 distribution. He rates his pain 10/10. The more he does, the more he hurts. He reports no pain when sitting. He has tried medications including gabapentin. He did fail steroid injections to his spine with Dr. Maier. He cannot go grocery shopping without significant pain. When he gets up in the morning his pain is the worse and that is when he needs pain medications.     Prior Spine Surgeries: none    Physical Therapy: no    Diabetic: no   Diabetes Composite Score: 4   Values used to calculate this score:    Points  Metrics       1        Blood Pressure: 138/82       1        Prescribed Statins: N/A - patient does not meet statin therapy criteria       1        Hemoglobin A1c: 5.9%       1        Smokes Tobacco: No       0        Prescribed Aspirin: No      Osteoporosis: no  No DXA results found for the past 12 months    Patient's BMI is Body mass index is 27.98 kg/m².    Review of Systems:  14/14 systems reviewed and negative other than what is listed in the history of present illness    Patient Active Problem List   Diagnosis    Achylic anemia    Anxiety disorder    AS (aortic stenosis)    Athscl heart disease of native coronary artery w/o ang pctrs    Colitis    Type 2 diabetes mellitus    Diastolic CHF (Multi)    Hypertriglyceridemia    Hypothyroidism    Pericardial effusion (HHS-HCC)    Pericarditis (HHS-HCC)    Pharyngitis    Rhinitis    Presence of stent in coronary artery    Pure hypercholesterolemia    Hypertension    PVD (peripheral vascular disease) (CMS-HCC)    Left flank pain    Acid reflux    Gastroesophageal reflux disease without esophagitis    Benign neoplasm of skin of right upper  eyelid    DISH (diffuse idiopathic skeletal hyperostosis)    Gastric polyp    Onychomycosis    Primary osteoarthritis involving multiple joints    Osteoarthritis of hip    Lymphocytic colitis    Elevated prostate specific antigen less than 10 ng/ml    Wellness examination    Benign prostatic hyperplasia with urinary frequency    Acute pyelonephritis    ALEXIA (acute kidney injury) (CMS-HCC)    Anemia    Diffuse systemic sclerosis (Multi)    Resting tremor    Diarrhea    Congestion of paranasal sinus    Lyme disease    Right bundle branch block (RBBB)    Spinal stenosis    Renal mass, left    Lumbosacral plexus lesion    Acute abdominal pain     Past Medical History:   Diagnosis Date    Abnormal result of other cardiovascular function study     Abnormal nuclear stress test    Acute ischemic heart disease, unspecified     ACS (acute coronary syndrome)    Atherosclerotic heart disease of native coronary artery without angina pectoris     Arteriosclerotic coronary artery disease    Chronic sinusitis, unspecified     Chronic congestion of paranasal sinus    Coronary angioplasty status     History of percutaneous coronary intervention    Encounter for preprocedural cardiovascular examination     Pre-operative cardiovascular examination    Encounter for screening for malignant neoplasm of prostate     Screening PSA (prostate specific antigen)    Lyme disease, unspecified 12/02/2013    Lyme disease    Other cervical disc displacement, unspecified cervical region     Herniated cervical disc without myelopathy    Other specified postprocedural states     History of carpal tunnel surgery    Other systemic sclerosis     Cutaneous scleroderma    Personal history of malignant neoplasm, unspecified     History of malignant neoplasm    Personal history of other diseases of the circulatory system     History of hypertension    Personal history of other diseases of the circulatory system     History of abnormal electrocardiography     Personal history of other diseases of the digestive system     History of gastroesophageal reflux (GERD)    Personal history of other diseases of the musculoskeletal system and connective tissue     History of low back pain    Personal history of other diseases of the musculoskeletal system and connective tissue     History of spinal stenosis    Personal history of other diseases of the musculoskeletal system and connective tissue     History of arthritis    Personal history of other endocrine, nutritional and metabolic disease     History of hypothyroidism    Personal history of other endocrine, nutritional and metabolic disease     History of hyperlipidemia    Personal history of other endocrine, nutritional and metabolic disease     History of type 2 diabetes mellitus    Personal history of other endocrine, nutritional and metabolic disease     History of hypoglycemia    Personal history of other specified conditions     History of chest pain    Polyp of stomach and duodenum     Gastric polyp     Past Surgical History:   Procedure Laterality Date    CORONARY ANGIOPLASTY WITH STENT PLACEMENT  07/27/2018    Cath Placement Of Stent 1    HAND SURGERY  07/27/2018    Hand Surgery                                                                                                                                                          OTHER SURGICAL HISTORY  07/27/2018    Surgery    OTHER SURGICAL HISTORY  07/27/2018    Total Hip Replacement Left     Social History     Tobacco Use    Smoking status: Never    Smokeless tobacco: Never   Substance Use Topics    Alcohol use: Never     family history includes Heart failure in his mother; Parkinsonism in his brother; Stroke in his mother; hepatic cirrhosis in his father.    Current Outpatient Medications:     Accu-Chek Pippa Plus test strp strip, USE TO TEST ONCE DAILY., Disp: , Rfl:     Accu-Chek Fastclix Lancet Drum misc, 1 Lancet early in the morning.., Disp: , Rfl:      amLODIPine (Norvasc) 10 mg tablet, TAKE 1 TABLET BY MOUTH ONCE  DAILY, Disp: 90 tablet, Rfl: 3    aspirin 81 mg EC tablet, Take 1 tablet (81 mg) by mouth once daily., Disp: , Rfl:     clindamycin (Cleocin) 300 mg capsule, Take 1 capsule (300 mg) by mouth. 1 HOUR BEFORE PROCEDURE AND 6 HOURS AFTER PROCEDURE AS DIRECTED, Disp: , Rfl:     coenzyme Q-10 300 mg capsule capsule, Take by mouth., Disp: , Rfl:     cyanocobalamin (Vitamin B-12) 100 mcg tablet, Take by mouth., Disp: , Rfl:     diphenoxylate-atropine (Lomotil) 2.5-0.025 mg tablet, Take 1 tablet by mouth once daily. With additional tablet in evening if needed, Disp: 30 tablet, Rfl: 2    famotidine (Pepcid) 40 mg tablet, TAKE 1 TABLET BY MOUTH TWICE  DAILY, Disp: 180 tablet, Rfl: 3    fenofibrate (Tricor) 48 mg tablet, Take 1 tablet (48 mg) by mouth once daily., Disp: 90 tablet, Rfl: 2    finasteride (Proscar) 5 mg tablet, Take 1 tablet (5 mg) by mouth once daily. (Patient taking differently: Take 1.25 mg by mouth once daily.), Disp: 90 tablet, Rfl: 3    gabapentin (Neurontin) 300 mg capsule, Take 1 capsule (300 mg) by mouth once daily at bedtime., Disp: 90 capsule, Rfl: 3    glimepiride (Amaryl) 1 mg tablet, Take by mouth., Disp: , Rfl:     glimepiride (Amaryl) 2 mg tablet, Take 1 tablet (2 mg) by mouth 2 times a day., Disp: , Rfl:     hydroCHLOROthiazide (HYDRODiuril) 25 mg tablet, Take 1 tablet (25 mg) by mouth once daily., Disp: 90 tablet, Rfl: 3    HYDROcodone-acetaminophen (Norco) 5-325 mg tablet, Take 1 tablet by mouth every 4 hours if needed for severe pain (7 - 10)., Disp: 120 tablet, Rfl: 0    lactobacillus acidophilus capsule, Take 1 capsule by mouth once daily. With breakfast, Disp: , Rfl:     levothyroxine (Synthroid, Levoxyl) 50 mcg tablet, , Disp: , Rfl:     Livalo 4 mg tablet, , Disp: , Rfl:     losartan (Cozaar) 50 mg tablet, Take 1 tablet (50 mg) by mouth once daily., Disp: , Rfl:     metoprolol tartrate (Lopressor) 25 mg tablet, TAKE 1 TABLET  BY MOUTH TWICE  DAILY, Disp: 180 tablet, Rfl: 3    multivitamin capsule, Take by mouth., Disp: , Rfl:     nitroglycerin (Nitrostat) 0.4 mg SL tablet, Place 1 tablet (0.4 mg) under the tongue every 5 minutes if needed for chest pain. Under the tongue as needed for chest pain, Disp: 25 tablet, Rfl: 2    omega-3s-dha-epa-fish oil 720-1,200 mg capsule,delayed release(DR/EC), Take by mouth., Disp: , Rfl:     potassium chloride CR (K-Tab) 20 mEq ER tablet, Take by mouth., Disp: , Rfl:     primidone (Mysoline) 50 mg tablet, Take 1 tablet (50 mg) by mouth once daily., Disp: 90 tablet, Rfl: 3    psyllium (Metamucil) powder, Take by mouth., Disp: , Rfl:     vit A/vit C/vit E/zinc/copper (PRESERVISION AREDS ORAL), 1 caps, ORAL, BID, Refill(s) 0, Date: 4/5/24 4:20:00 PM EDT, Disp: , Rfl:     vit C/zinc citrate/elderberry (SAMBUCUS ELDERBERRY ORAL), Take by mouth., Disp: , Rfl:   Allergies   Allergen Reactions    Morphine Unknown    Penicillins Unknown    Valacyclovir Rash       Physical Examination    General: Well developed, awake/alert/oriented x3, no distress, alert and cooperative  Skin: Warm and dry, no lesions, no rashes  ENMT: Mucous membranes moist, no apparent injury, no lesions seen  Head/Neck: Neck Supple, no apparent injury  Respiratory/Thorax: Normal breath sounds with good chest expansion, thorax symmetric  Cardiovascular: No pitting edema, no JVD    Motor Strength: 5/5 Throughout all extremities    Muscle Bulk: Normal and symmetric in all extremities    Posture:   -- Cervical: Normal  -- Thoracic: Normal  -- Lumbar : Normal  Paraspinal muscle spasm/tenderness absent.     Sensation: intact to light touch    Lumbar radiculopathy L5-S1  bacK PAIN    Results    I personally reviewed and interpreted the imaging results which included MRI L spine showing moderate to severe foraminal stenosis L4-S1 and severe spinal stenosis L4-5.     Assessment and Plan:    Curtis Brown is a 84 y.o. year old male who presents to  the spine clinic with a few years of mechanical low back pain. The pain radiates down both hips and back of the legs in the L5 and S1 distribution. He rates his pain 10/10. The more he does, the more he hurts. He reports no pain when sitting. He has tried medications including gabapentin. He did fail steroid injections to his spine with Dr. Maier. He cannot go grocery shopping without significant pain. When he gets up in the morning his pain is the worse and that is when he needs pain medications.      I have personally reviewed and interpreted the imaging and detailed diagnosis with the patient, discussed the natural history of their disease and both non-operative and operative treatments available and rationale and the risks for all options.    The patient’s clinical symptoms correlates well with the radiological findings. Patient has been having significant functional impairment with decreased ability to perform her normal activities of daily living. They have tried treatment options including medications (NSAIDs/narcotics/muscle relaxants/membrane stabilizers), formal physical therapy, and injections.    I offered the option of surgery that would consist of a L4-S1 decompression.    I have explained the surgical procedure in detail with expected duration and extent of recovery along risks of surgery that include, but is not limited to bleeding, infection, blood vessel injury or damage, loss of sensation, loss of bladder, bowel or sexual function, nerve injury/damage resulting in weakness/paralysis, malunion, nonunion, CSF leak, brachial plexus injury, peripheral vision blindness, failure of implants/fusion, failure to relieve symptoms, recurrent disease, adjacent segment disease, need to reoperate for any reason and general anesthesia reaction such as stroke, coma, heart attack, delirium, confusion, death as well as worsening of preexisted medical conditions.    I clearly emphasized that while the goal of  surgery is to decompress the spinal cord so as to ARREST the progression of neurological deficits - preexisting deficits may or may not improve after surgery. We discussed that many patients do clinically improve in functional and neurological outcomes following decompression of the spinal elements in patients with lumbar degenerative disease or radiculopathy the extent of which is variable and depends on the severity of pain, numbness, tingling, or weakness. With improvement seen of those symptoms in that order. We did discuss the goal of surgery to alleviate pain first and foremost and hope for recovery of all neurologic function with time.    All questions were answered and the patient left satisfied with the surgical plan moving forward.      I have reviewed all prior documentation and reviewed the electronic medical record since admission. I have personally have reviewed all advanced imaging not just the reports and used my interpretation as documented as the relevant findings. I have reviewed the risks and benefits of all treatment recommendations listed in this note with the patient and family.       The above clinical summary has been dictated with voice recognition software. It has not been proofread for grammatical errors, typographical mistakes, or other semantic inconsistencies.    Thank you for visiting our office today. It was our pleasure to take part in your healthcare.     Do not hesitate to call with any questions regarding your plan of care after leaving at [phone] M-F 8am-4pm.     To clinicians, thank you very much for this kind referral. It is a privilege to partner with you in the care of your patients. My office would be delighted to assist you with any further consultations or with questions regarding the plan of care outlined. Do not hesitate to call the office or contact me directly.       Sincerely,      Zia Church MD, [facility]  Spine , [facility]  [facility]  Micheal GUILLEN and Wen Theodore Chair in Spinal Neurosurgery  Complex Spine Surgery Fellowship Director   of Neurological Surgery  [facility] [employer]  Phone: [phone]  Fax: [phone]        Scribe Attestation  By signing my name below, Terri URENA Scribe   attest that this documentation has been prepared under the direction and in the presence of Zia Church MD.

## 2025-01-20 ASSESSMENT — DUKE ACTIVITY SCORE INDEX (DASI)
CAN YOU RUN A SHORT DISTANCE: NO
CAN YOU DO LIGHT WORK AROUND THE HOUSE LIKE DUSTING OR WASHING DISHES: YES
CAN YOU WALK A BLOCK OR TWO ON LEVEL GROUND: NO
CAN YOU PARTICIPATE IN STRENOUS SPORTS LIKE SWIMMING, SINGLES TENNIS, FOOTBALL, BASKETBALL, OR SKIING: NO
CAN YOU CLIMB A FLIGHT OF STAIRS OR WALK UP A HILL: NO
DASI METS SCORE: 3.6
CAN YOU HAVE SEXUAL RELATIONS: NO
CAN YOU DO MODERATE WORK AROUND THE HOUSE LIKE VACUUMING, SWEEPING FLOORS OR CARRYING GROCERIES: NO
TOTAL_SCORE: 7.2
CAN YOU DO HEAVY WORK AROUND THE HOUSE LIKE SCRUBBING FLOORS OR LIFTING AND MOVING HEAVY FURNITURE: NO
CAN YOU PARTICIPATE IN MODERATE RECREATIONAL ACTIVITIES LIKE GOLF, BOWLING, DANCING, DOUBLES TENNIS OR THROWING A BASEBALL OR FOOTBALL: NO
CAN YOU DO YARD WORK LIKE RAKING LEAVES, WEEDING OR PUSHING A MOWER: NO
CAN YOU WALK INDOORS, SUCH AS AROUND YOUR HOUSE: YES
CAN YOU TAKE CARE OF YOURSELF (EAT, DRESS, BATHE, OR USE TOILET): YES

## 2025-01-20 ASSESSMENT — ACTIVITIES OF DAILY LIVING (ADL): ADL_SCORE: 1

## 2025-01-20 ASSESSMENT — LIFESTYLE VARIABLES: SMOKING_STATUS: NONSMOKER

## 2025-01-21 ENCOUNTER — LAB (OUTPATIENT)
Dept: LAB | Facility: LAB | Age: 85
End: 2025-01-21
Payer: MEDICARE

## 2025-01-21 ENCOUNTER — HOSPITAL ENCOUNTER (OUTPATIENT)
Dept: RADIOLOGY | Facility: HOSPITAL | Age: 85
Discharge: HOME | End: 2025-01-21
Payer: MEDICARE

## 2025-01-21 ENCOUNTER — PRE-ADMISSION TESTING (OUTPATIENT)
Dept: PREADMISSION TESTING | Facility: HOSPITAL | Age: 85
End: 2025-01-21
Payer: MEDICARE

## 2025-01-21 VITALS
HEART RATE: 75 BPM | TEMPERATURE: 96.8 F | SYSTOLIC BLOOD PRESSURE: 147 MMHG | DIASTOLIC BLOOD PRESSURE: 65 MMHG | BODY MASS INDEX: 28.37 KG/M2 | RESPIRATION RATE: 16 BRPM | OXYGEN SATURATION: 97 % | WEIGHT: 187.17 LBS | HEIGHT: 68 IN

## 2025-01-21 DIAGNOSIS — R73.09 OTHER ABNORMAL GLUCOSE: ICD-10-CM

## 2025-01-21 DIAGNOSIS — M54.16 LUMBAR RADICULOPATHY: ICD-10-CM

## 2025-01-21 DIAGNOSIS — R79.1 ABNORMAL COAGULATION PROFILE: ICD-10-CM

## 2025-01-21 DIAGNOSIS — Z01.818 PREOP EXAMINATION: ICD-10-CM

## 2025-01-21 DIAGNOSIS — M48.061 SPINAL STENOSIS OF LUMBAR REGION, UNSPECIFIED WHETHER NEUROGENIC CLAUDICATION PRESENT: ICD-10-CM

## 2025-01-21 DIAGNOSIS — M54.16 LUMBAR RADICULOPATHY: Primary | ICD-10-CM

## 2025-01-21 LAB
ABO GROUP (TYPE) IN BLOOD: NORMAL
ANION GAP SERPL CALC-SCNC: 15 MMOL/L (ref 10–20)
ANTIBODY SCREEN: NORMAL
APTT PPP: 38 SECONDS (ref 27–38)
BUN SERPL-MCNC: 25 MG/DL (ref 6–23)
CALCIUM SERPL-MCNC: 10.1 MG/DL (ref 8.6–10.3)
CHLORIDE SERPL-SCNC: 101 MMOL/L (ref 98–107)
CO2 SERPL-SCNC: 26 MMOL/L (ref 21–32)
CREAT SERPL-MCNC: 1.25 MG/DL (ref 0.5–1.3)
EGFRCR SERPLBLD CKD-EPI 2021: 57 ML/MIN/1.73M*2
ERYTHROCYTE [DISTWIDTH] IN BLOOD BY AUTOMATED COUNT: 13.6 % (ref 11.5–14.5)
EST. AVERAGE GLUCOSE BLD GHB EST-MCNC: 126 MG/DL
GLUCOSE SERPL-MCNC: 116 MG/DL (ref 74–99)
HBA1C MFR BLD: 6 %
HCT VFR BLD AUTO: 44.6 % (ref 41–52)
HGB BLD-MCNC: 14.6 G/DL (ref 13.5–17.5)
INR PPP: 1 (ref 0.9–1.1)
MCH RBC QN AUTO: 31.9 PG (ref 26–34)
MCHC RBC AUTO-ENTMCNC: 32.7 G/DL (ref 32–36)
MCV RBC AUTO: 97 FL (ref 80–100)
NRBC BLD-RTO: 0 /100 WBCS (ref 0–0)
PLATELET # BLD AUTO: 198 X10*3/UL (ref 150–450)
POTASSIUM SERPL-SCNC: 3.9 MMOL/L (ref 3.5–5.3)
PREALB SERPL-MCNC: 37.9 MG/DL (ref 18–40)
PROTHROMBIN TIME: 10.8 SECONDS (ref 9.8–12.8)
RBC # BLD AUTO: 4.58 X10*6/UL (ref 4.5–5.9)
RH FACTOR (ANTIGEN D): NORMAL
SODIUM SERPL-SCNC: 138 MMOL/L (ref 136–145)
WBC # BLD AUTO: 9.2 X10*3/UL (ref 4.4–11.3)

## 2025-01-21 PROCEDURE — 93005 ELECTROCARDIOGRAM TRACING: CPT

## 2025-01-21 PROCEDURE — 99202 OFFICE O/P NEW SF 15 MIN: CPT

## 2025-01-21 PROCEDURE — 83036 HEMOGLOBIN GLYCOSYLATED A1C: CPT

## 2025-01-21 PROCEDURE — 84134 ASSAY OF PREALBUMIN: CPT

## 2025-01-21 PROCEDURE — 87081 CULTURE SCREEN ONLY: CPT | Mod: STJLAB

## 2025-01-21 PROCEDURE — 71046 X-RAY EXAM CHEST 2 VIEWS: CPT

## 2025-01-21 RX ORDER — BISMUTH SUBSALICYLATE 262 MG
1 TABLET,CHEWABLE ORAL DAILY
COMMUNITY

## 2025-01-21 RX ORDER — LEVOTHYROXINE SODIUM 50 UG/1
100 TABLET ORAL 2 TIMES WEEKLY
COMMUNITY

## 2025-01-21 RX ORDER — NAPROXEN SODIUM 220 MG/1
81 TABLET, FILM COATED ORAL DAILY
Status: ON HOLD | COMMUNITY
End: 2025-01-31

## 2025-01-21 RX ORDER — CHLORHEXIDINE GLUCONATE ORAL RINSE 1.2 MG/ML
SOLUTION DENTAL
Qty: 473 ML | Refills: 0 | Status: SHIPPED | OUTPATIENT
Start: 2025-01-21

## 2025-01-21 NOTE — PREPROCEDURE INSTRUCTIONS
Thank you for visiting Preadmission Testing at [facility]. If you have any changes to your health condition, please call the SURGEON's office to alert them and give them details of your symptoms.  STOP all NSAIDS (Ibuprofen, Motrin, Aleve), vitamins, herbals, supplements, and all over the counter medications for 7 days prior to surgery  Tylenol is okay to take up until the morning of surgery for pain or headache if needed         Preoperative Brain Exercises    What are brain exercises?  A brain exercise is any activity that engages your thinking (cognitive) skills.    What types of activities are considered brain exercises?  Jigsaw puzzles, crossword puzzles, word jumble, memory games, word search, and many more.  Many can be found free online or on your phone via a mobile lottie.    Why should I do brain exercises before my surgery?  More recent research has shown brain exercise before surgery can lower the risk of postoperative delirium (confusion) which can be especially important for older adults.  Patients who did brain exercises for 5 to 10 hours the days before surgery, cut their risk of postoperative delirium in half up to 1 week after surgery.      Preoperative Deep Breathing Exercises    Why it is important to do deep breathing exercises before my surgery?  Deep breathing exercises strengthen your breathing muscles.  This helps you to recover after your surgery and decreases the chance of breathing complications.    How are the deep breathing exercises done?  Sit straight with your back supported.  Breathe in deeply and slowly through your nose. Your lower rib cage should expand and your abdomen may move forward.  Hold that breath for 3 to 5 seconds.  Breathe out through pursed lips, slowly and completely.  Rest and repeat 10 times every hour while awake.  Rest longer if you become dizzy or lightheaded.      Patient and Family Education   Ways You Can Help Prevent Blood Clots     This handout explains some simple  things you can do to help prevent blood clots.      Blood clots are blockages that can form in the body's veins. When a blood clot forms in your deep veins, it may be called a deep vein thrombosis, or DVT for short. Blood clots can happen in any part of the body where blood flows, but they are most common in the arms and legs. If a piece of a blood clot breaks free and travels to the lungs, it is called a pulmonary embolus (PE). A PE can be a very serious problem.      Being in the hospital or having surgery can raise your chances of getting a blood clot because you may not be well enough to move around as much as you normally do.      Ways you can help prevent blood clots in the hospital         Wearing SCDs. SCDs stands for Sequential Compression Devices.   SCDs are special sleeves that wrap around your legs  They attach to a pump that fills them with air to gently squeeze your legs every few minutes.   This helps return the blood in your legs to your heart.   SCDs should only be taken off when walking or bathing.   SCDs may not be comfortable, but they can help save your life.               Wearing compression stockings - if your doctor orders them. These special snug fitting stockings gently squeeze your legs to help blood flow.       Walking. Walking helps move the blood in your legs.   If your doctor says it is ok, try walking the halls at least   5 times a day. Ask us to help you get up, so you don't fall.      Taking any blood thinning medicines your doctor orders.          ©[facility]; 3/23        Ways you can help prevent blood clots at home       Wearing compression stockings - if your doctor orders them. ? Walking - to help move the blood in your legs.       Taking any blood thinning medicines your doctor orders.      Signs of a blood clot or PE      Tell your doctor or nurse know right away if you have of the problems listed below.    If you are at home, seek medical care right away. Call [phone]  for chest pain or problems breathing.          Signs of a blood clot (DVT) - such as pain,  swelling, redness or warmth in your arm or leg      Signs of a pulmonary embolism (PE) - such as chest     pain or feeling short of breath

## 2025-01-21 NOTE — CPM/PAT H&P
CPM/PAT Evaluation       Name: Curtis Brown (Curtis Brown)  /Age: 1940/84 y.o.     In-Person       Chief Complaint: Low back pain     HPI  Pleasant 84-year-old male presents with lumbar radiculopathy and spinal stenosis of lumbar region scheduled for L4-S1 decompression on 2025. He has pain that radiates down his bilateral hips and back of his legs. Endorses pain of a 10/10. He is very limited with physical activity/mobility due to pain. He states he has attempted multiple therapy options without relief of symptoms. Denies recent fever/illness/chills.     Past Medical History:   Diagnosis Date    Abnormal result of other cardiovascular function study     Abnormal nuclear stress test    Acute ischemic heart disease, unspecified     ACS (acute coronary syndrome)    Atherosclerotic heart disease of native coronary artery without angina pectoris     Arteriosclerotic coronary artery disease    Chronic sinusitis, unspecified     Chronic congestion of paranasal sinus    Congenital heart disease     Coronary angioplasty status     History of percutaneous coronary intervention    Encounter for preprocedural cardiovascular examination     Pre-operative cardiovascular examination    Encounter for screening for malignant neoplasm of prostate     Screening PSA (prostate specific antigen)    Heart disease     Hypertension     Lyme disease, unspecified 2013    Lyme disease    Other cervical disc displacement, unspecified cervical region     Herniated cervical disc without myelopathy    Other specified postprocedural states     History of carpal tunnel surgery    Other systemic sclerosis     Cutaneous scleroderma    Personal history of malignant neoplasm, unspecified     History of malignant neoplasm    Personal history of other diseases of the circulatory system     History of hypertension    Personal history of other diseases of the circulatory system     History of abnormal electrocardiography     Personal history of other diseases of the digestive system     History of gastroesophageal reflux (GERD)    Personal history of other diseases of the musculoskeletal system and connective tissue     History of low back pain    Personal history of other diseases of the musculoskeletal system and connective tissue     History of spinal stenosis    Personal history of other diseases of the musculoskeletal system and connective tissue     History of arthritis    Personal history of other endocrine, nutritional and metabolic disease     History of hypothyroidism    Personal history of other endocrine, nutritional and metabolic disease     History of hyperlipidemia    Personal history of other endocrine, nutritional and metabolic disease     History of type 2 diabetes mellitus    Personal history of other endocrine, nutritional and metabolic disease     History of hypoglycemia    Personal history of other specified conditions     History of chest pain    Polyp of stomach and duodenum     Gastric polyp    Type 2 diabetes mellitus        Past Surgical History:   Procedure Laterality Date    COLONOSCOPY      CORONARY ANGIOPLASTY WITH STENT PLACEMENT  07/27/2018    Cath Placement Of Stent 1    HAND SURGERY  07/27/2018    Hand Surgery                                                                                                                                                          OTHER SURGICAL HISTORY  07/27/2018    Surgery    OTHER SURGICAL HISTORY  07/27/2018    Total Hip Replacement Left       Patient  has no history on file for sexual activity.    Family History   Problem Relation Name Age of Onset    Stroke Mother      Heart failure Mother      Other (hepatic cirrhosis) Father      Parkinsonism Brother         Allergies   Allergen Reactions    Gabapentin Dizziness    Morphine Dizziness and Nausea/vomiting    Primidone Nausea/vomiting    Penicillins Hives and Rash    Valacyclovir Rash       Prior to Admission  medications    Medication Sig Start Date End Date Taking? Authorizing Provider   Enid Pippa Plus test strp strip USE TO TEST ONCE DAILY. 4/11/23   Historical Provider, MD Rossi Fastclix Lancet Drum misc 1 Lancet early in the morning.. 4/11/23   Historical Provider, MD   amLODIPine (Norvasc) 10 mg tablet TAKE 1 TABLET BY MOUTH ONCE  DAILY 10/15/24   Baudilio Gonzalez DO   aspirin 81 mg EC tablet Take 1 tablet (81 mg) by mouth once daily.    Historical Provider, MD   clindamycin (Cleocin) 300 mg capsule Take 1 capsule (300 mg) by mouth. 1 HOUR BEFORE PROCEDURE AND 6 HOURS AFTER PROCEDURE AS DIRECTED 11/1/22   Historical Provider, MD   coenzyme Q-10 300 mg capsule capsule Take by mouth. 12/2/19   Historical Provider, MD   cyanocobalamin (Vitamin B-12) 100 mcg tablet Take by mouth. 12/2/19   Historical Provider, MD   diphenoxylate-atropine (Lomotil) 2.5-0.025 mg tablet Take 1 tablet by mouth once daily. With additional tablet in evening if needed 3/4/24   Baudilio Gonzalez DO   famotidine (Pepcid) 40 mg tablet TAKE 1 TABLET BY MOUTH TWICE  DAILY 10/15/24   Baudilio Gonzalez DO   fenofibrate (Tricor) 48 mg tablet Take 1 tablet (48 mg) by mouth once daily. 8/7/24 8/2/25  Baudilio Gonzalez DO   finasteride (Proscar) 5 mg tablet Take 1 tablet (5 mg) by mouth once daily.  Patient taking differently: Take 1.25 mg by mouth once daily. 10/25/23   Baudilio Gonzalez DO   gabapentin (Neurontin) 300 mg capsule Take 1 capsule (300 mg) by mouth once daily at bedtime. 11/20/24 11/15/25  Baudilio Gonzalez DO   glimepiride (Amaryl) 1 mg tablet Take by mouth. 11/22/13   Historical Provider, MD   glimepiride (Amaryl) 2 mg tablet Take 1 tablet (2 mg) by mouth 2 times a day. 4/1/24   Historical Provider, MD   hydroCHLOROthiazide (HYDRODiuril) 25 mg tablet Take 1 tablet (25 mg) by mouth once daily. 10/27/23   Baudilio Gonzalez,    lactobacillus acidophilus capsule Take 1 capsule by mouth once daily. With breakfast 8/27/22    Historical Provider, MD   levothyroxine (Synthroid, Levoxyl) 50 mcg tablet  3/10/24   Historical Provider, MD   Livalo 4 mg tablet  4/8/24   Historical Provider, MD   losartan (Cozaar) 50 mg tablet Take 1 tablet (50 mg) by mouth once daily. 2/4/22   Historical Provider, MD   metoprolol tartrate (Lopressor) 25 mg tablet TAKE 1 TABLET BY MOUTH TWICE  DAILY 7/2/24   Christian Webb, PAWilliamC   multivitamin capsule Take by mouth.    Historical Provider, MD   nitroglycerin (Nitrostat) 0.4 mg SL tablet Place 1 tablet (0.4 mg) under the tongue every 5 minutes if needed for chest pain. Under the tongue as needed for chest pain 10/9/23   Anil Lindsey MD   toaun-3m-lcp-epa-fish oil 720-1,200 mg capsule,delayed release(DR/EC) Take by mouth. 12/2/19   Historical Provider, MD   potassium chloride CR (K-Tab) 20 mEq ER tablet Take by mouth.    Historical Provider, MD   primidone (Mysoline) 50 mg tablet Take 1 tablet (50 mg) by mouth once daily. 3/4/24 3/4/25  Baudilio Gonzalez,    psyllium (Metamucil) powder Take by mouth.    Historical Provider, MD   vit A/vit C/vit E/zinc/copper (PRESERVISION AREDS ORAL) 1 caps, ORAL, BID, Refill(s) 0, Date: 4/5/24 4:20:00 PM EDT 4/5/24   Historical Provider, MD   vit C/zinc citrate/elderberry (SAMBUCUS ELDERBERRY ORAL) Take by mouth.    Historical Provider, MD        Constitutional: Negative for fever, chills, or sweats   ENMT: Negative for nasal discharge, congestion, ear pain, mouth pain, throat pain. Positive for sinus congestion-in the afternoon-chronic.   Respiratory: Negative for cough, wheezing, shortness of breath   Cardiac: Negative for chest pain, dyspnea on exertion, palpitations   Gastrointestinal: Negative for nausea, vomiting, diarrhea, constipation, abdominal pain  Genitourinary: Negative for dysuria, flank pain, frequency, hematuria   Musculoskeletal: Negative for decreased ROM, pain, swelling, weakness. See HPI.  Neurological: Negative for dizziness, confusion,  headache  Psychiatric: Negative for mood changes   Skin: Negative for itching, rash, ulcer    Hematologic/Lymph: Negative for bruising, easy bleeding  Allergic/Immunologic: Negative itching, sneezing, swelling      Physical Exam  Vitals reviewed.   Constitutional:       Appearance: Normal appearance.   HENT:      Head: Normocephalic.      Mouth/Throat:      Mouth: Mucous membranes are moist.      Pharynx: Oropharynx is clear.   Eyes:      Pupils: Pupils are equal, round, and reactive to light.   Cardiovascular:      Rate and Rhythm: Normal rate and regular rhythm.      Heart sounds: Murmur (2/6) heard.   Pulmonary:      Effort: Pulmonary effort is normal.      Breath sounds: Normal breath sounds.   Abdominal:      General: Bowel sounds are normal.      Palpations: Abdomen is soft.   Musculoskeletal:         General: Normal range of motion.      Cervical back: Normal range of motion.      Comments: Generalized weakness noted    Skin:     General: Skin is warm and dry.   Neurological:      General: No focal deficit present.      Mental Status: He is alert and oriented to person, place, and time.   Psychiatric:         Mood and Affect: Mood normal.         Behavior: Behavior normal.          PAT AIRWAY:   Airway:     Mallampati::  II    Neck ROM::  Limited  normal        Testing/Diagnostic:   Echo from 10/30/24: CONCLUSIONS:   1. Left ventricular ejection fraction is normal, by visual estimate at 60-65%.   2. Left ventricular diastolic filling was indeterminate.   3. There is moderate concentric left ventricular hypertrophy.   4. There is normal right ventricular global systolic function.   5. The left atrium is moderately dilated.   6. Mild to moderate mitral valve regurgitation.   7. Right ventricular systolic pressure is within normal limits.   8. Severe aortic valve stenosis.   9. There is moderate to severe aortic valve thickening.  10. Mild aortic valve regurgitation.  11. There is mild to moderate pulmonic valve  "regurgitation.    Patient Specialist/PCP:   PCP: Dr. Gonzalez  Cardiology: Dr. Lindsey     Visit Vitals  /65   Pulse 75   Temp 36 °C (96.8 °F) (Temporal)   Resp 16   Ht 1.727 m (5' 8\")   Wt 84.9 kg (187 lb 2.7 oz)   SpO2 97%   BMI 28.46 kg/m²   Smoking Status Never   BSA 2.02 m²       DASI Risk Score      Flowsheet Row Pre-Admission Testing from 1/21/2025 in [facility]   Can you take care of yourself (eat, dress, bathe, or use toilet)?  2.75 filed at 01/20/2025 1311   Can you walk indoors, such as around your house? 1.75 filed at 01/20/2025 1311   Can you walk a block or two on level ground?  0 filed at 01/20/2025 1311   Can you climb a flight of stairs or walk up a hill? 0 filed at 01/20/2025 1311   Can you run a short distance? 0 filed at 01/20/2025 1311   Can you do light work around the house like dusting or washing dishes? 2.7 filed at 01/20/2025 1311   Can you do moderate work around the house like vacuuming, sweeping floors or carrying groceries? 0 filed at 01/20/2025 1311   Can you do heavy work around the house like scrubbing floors or lifting and moving heavy furniture?  0 filed at 01/20/2025 1311   Can you do yard work like raking leaves, weeding or pushing a mower? 0 filed at 01/20/2025 1311   Can you have sexual relations? 0 filed at 01/20/2025 1311   Can you participate in moderate recreational activities like golf, bowling, dancing, doubles tennis or throwing a baseball or football? 0 filed at 01/20/2025 1311   Can you participate in strenous sports like swimming, singles tennis, football, basketball, or skiing? 0 filed at 01/20/2025 1311   DASI SCORE 7.2 filed at 01/20/2025 1311   METS Score (Will be calculated only when all the questions are answered) 3.6 filed at 01/20/2025 1311          Caprini DVT Assessment      Flowsheet Row Pre-Admission Testing from 1/21/2025 in [facility]   DVT Score (IF A SCORE IS NOT CALCULATING, MUST SELECT A BMI TO COMPLETE) 12 filed " at 01/20/2025 1310   Medical Factors History of DVT/PE filed at 01/20/2025 1310   Surgical Factors Major surgery planned, lasting over 3 hours filed at 01/20/2025 1310   BMI (BMI MUST BE CHOSEN) 30 or less filed at 01/20/2025 1310          Modified Frailty Index      Flowsheet Row Pre-Admission Testing from 1/21/2025 in [facility]   Non-independent functional status (problems with dressing, bathing, personal grooming, or cooking) 0 filed at 01/20/2025 1312   History of diabetes mellitus  0.0909 filed at 01/20/2025 1312   History of COPD 0 filed at 01/20/2025 1312   History of CHF 0.0909 filed at 01/20/2025 1312   History of MI 0 filed at 01/20/2025 1312   History of Percutaneous Coronary Intervention, Cardiac Surgery, or Angina 0.0909 filed at 01/20/2025 1312   Hypertension requiring the use of medication  0.0909 filed at 01/20/2025 1312   Peripheral vascular disease 0.0909 filed at 01/20/2025 1312   Impaired sensorium (cognitive impairement or loss, clouding, or delirium) 0 filed at 01/20/2025 1312   TIA or CVA withouy residual deficit 0 filed at 01/20/2025 1312   Cerebrovascular accident with deficit 0 filed at 01/20/2025 1312   Modified Frailty Index Calculator .4545 filed at 01/20/2025 1312          CHADS2 Stroke Risk  Current as of 19 minutes ago        N/A 3 to 100%: High Risk   2 to < 3%: Medium Risk   0 to < 2%: Low Risk     Last Change: N/A          This score determines the patient's risk of having a stroke if the patient has atrial fibrillation.        This score is not applicable to this patient. Components are not calculated.          Revised Cardiac Risk Index      Flowsheet Row Pre-Admission Testing from 1/21/2025 in [facility]   High-Risk Surgery (Intraperitoneal, Intrathoracic,Suprainguinal vascular) 0 filed at 01/20/2025 1312   History of ischemic heart disease (History of MI, History of positive exercuse test, Current chest paint considered due to myocardial  ischemia, Use of nitrate therapy, ECG with pathological Q Waves) 1 filed at 2025 1312   History of congestive heart failure (pulmonary edemia, bilateral rales or S3 gallop, Paroxysmal nocturnal dyspnea, CXR showing pulmonary vascular redistribution) 1 filed at 2025 1312   History of cerebrovascular disease (Prior TIA or stroke) 0 filed at 2025 1312   Pre-operative insulin treatment 0 filed at 2025 1312   Pre-operative creatinine>2 mg/dl 0 filed at 2025 1312   Revised Cardiac Risk Calculator 2 filed at 2025 1312          Apfel Simplified Score      Flowsheet Row Pre-Admission Testing from 2025 in [facility]   Smoking status 1 filed at 2025 1312   History of motion sickness or PONV  0 filed at 2025 1312   Use of postoperative opioids 0 filed at 2025 1312   Gender - Female 0=No filed at 2025 1312   Apfel Simplified Score Calculator 1 filed at 2025 1312          Risk Analysis Index Results This Encounter         2025  1312             Do you live in a place other than your own home?: 0    When did you begin living in the place you are currently residing?: Greater than one year ago    Any kidney failure, kidney not working well, or seeing a kidney doctor (nephrologist)? If yes, was this for kidney stones or another problem?: 0 No    Any history of chronic (long-term) congestive heart failure (CHF)?: 0 No    Any shortness of breath when resting?: 0 No    In the past five years, have you been diagnosed with or treated for cancer?: No    During the last 3 months has it become difficult for you to remember things or organize your thoughts?: 0 No    Have you lost weight of 10 pounds or more in the past 3 months without trying?: 0 No    Do you have any loss of appetitie?: 0 No    Getting Around (Mobility): 1 Needs help from cane, walker, or scooter    Eatin Can plan and prepare own meals    Toiletin Can use toilet without any  help    Personal Hygiene (Bathing, Hand Washing, Changing Clothes): 0 Can shower or bathe without any help    HUBER Cancer History: Patient does not indicate history of cancer    Total Risk Analysis Index Score Without Cancer: 30    Total Risk Analysis Index Score: 30          Stop Bang Score      Flowsheet Row Pre-Admission Testing from 1/21/2025 in [facility]   Do you snore loudly? 0 filed at 01/20/2025 1311   Do you often feel tired or fatigued after your sleep? 0 filed at 01/20/2025 1311   Has anyone ever observed you stop breathing in your sleep? 0 filed at 01/20/2025 1311   Do you have or are you being treated for high blood pressure? 1 filed at 01/20/2025 1311   Recent BMI (Calculated) 28 filed at 01/20/2025 1311   Is BMI greater than 35 kg/m2? 0=No filed at 01/20/2025 1311   Age older than 50 years old? 1=Yes filed at 01/20/2025 1311   Is your neck circumference greater than 17 inches (Male) or 16 inches (Female)? 0 filed at 01/20/2025 1311   Gender - Male 1=Yes filed at 01/20/2025 1311   STOP-BANG Total Score 3 filed at 01/20/2025 1311          Prodigy: High Risk  Total Score: 31              Prodigy Age Score      Prodigy Gender Score     Prodigy CHF score          ARISCAT Score for Postoperative Pulmonary Complications      Flowsheet Row Pre-Admission Testing from 1/21/2025 in [facility]   Age Calculated Score 16 filed at 01/20/2025 1315   Preoperative SpO2 8 filed at 01/20/2025 1315   Respiratory infection in the last month Either upper or lower (i.e., URI, bronchitis, pneumonia), with fever and antibiotic treatment 0 filed at 01/20/2025 1315   Preoperative anemia (Hgb less than 10 g/dl) 0 filed at 01/20/2025 1315   Surgical incision  0 filed at 01/20/2025 1315   Duration of surgery  16 filed at 01/20/2025 1315   Emergency Procedure  0 filed at 01/20/2025 1315   ARISCAT Total Score  40 filed at 01/20/2025 1315          Carslon Perioperative Risk for Myocardial Infarction or  Cardiac Arrest (SAL)      Flowsheet Row Pre-Admission Testing from 1/21/2025 in [facility]   Calculated Age Score 1.64 filed at 01/20/2025 1315   Functional Status  0 filed at 01/20/2025 1315   ASA Class  -3.29 filed at 01/20/2025 1315   Creatinine 0 filed at 01/20/2025 1315   Type of Procedure  0.21 filed at 01/20/2025 1315   SAL Total Score  -6.69 filed at 01/20/2025 1315   SAL % 0.12 filed at 01/20/2025 1315            Assessment and Plan:     Assessment and Plan:     Preop:   OR with Dr. Church on 1/30/25 for L4-S1 decompression   Labs ordered per Dr. Church.   EKG obtained and enclosed. SR with occasional PVCs. RBBB. Comparable EKG on file.     EKG faxed to Dr. Lindsey and cardiac clearance requested.     Neurologic:   Resting tremor: No acute concerns   The patient is at an increased risk for post operative delirium secondary to age >/=65 , decrease functional status, polypharmacy , and type and duration of surgery . Preoperative brain exercise educational handout provided to patient.  The patient is at an increased risk for perioperative stroke secondary to cardiac disease, increased age, HTN, HLD, DM , and general anesthesia.    Cardiac:  Pericarditis: In 2022-resolved with treatment   Hyperlipidemia: On statin   Hypertension: Controlled with medical management   Diastolic CHF: Stable. No acute concerns.   Severe aortic stenosis: Per patient-will likely need a valve in the future.   CAD: Stent in 2017. Takes baby aspirin daily   Follows with cardiology-Dr. Lindsey   Rainey Activity Status Index (DASI)  DASI Score: 7.2   MET Score: 3.6  RCRI  >/=3 which is 15% 30 day risk of MACE (risk for cardiac death, nonfatal myocardial infarction, and nonfactal cardiac arrest)  SAL score which indicates a   0.12% risk of intraoperative or 30-day postoperative MACE    Pulmonary:   STOP-BANG score of  3 . Intermediate  risk of obstructive sleep apnea.   ARISCAT:   40  points which is a intermediate (13.3%)  risk of in-hospital post-op pulmonary complications     Endocrine:  Hypothyroidism: On levothyroxine   DM II: Last A1c was 5.9. Dr. Welsh-independent endocrinologist. On oral medication and sliding scale if needed. He does not normally need sliding scale     GI:  Apfel: 1 points 21% risk for post operative N/V    /Renal:   Renal mass: Follows with urology-under surveillance presently     Neuro-muscular:   Spinal stenosis: Reason for upcoming procedure   Osteoarthritis: S/P total hip replacement     Hematologic:   Caprini score 12, patient at high risk for perioperative DVT. Patient provided with VTE education/handout.     Skin check: Patient was instructed to make surgeon aware of any skin changes/concerns prior to surgery.     Anesthesia: No history of anesthesia complications. No anesthesia concerns.      *See risk scores as previously documented

## 2025-01-21 NOTE — H&P (VIEW-ONLY)
CPM/PAT Evaluation       Name: Curtis Brown (Curtis Brown)  /Age: 1940/84 y.o.     In-Person       Chief Complaint: Low back pain     HPI  Pleasant 84-year-old male presents with lumbar radiculopathy and spinal stenosis of lumbar region scheduled for L4-S1 decompression on 2025. He has pain that radiates down his bilateral hips and back of his legs. Endorses pain of a 10/10. He is very limited with physical activity/mobility due to pain. He states he has attempted multiple therapy options without relief of symptoms. Denies recent fever/illness/chills.     Past Medical History:   Diagnosis Date    Abnormal result of other cardiovascular function study     Abnormal nuclear stress test    Acute ischemic heart disease, unspecified     ACS (acute coronary syndrome)    Atherosclerotic heart disease of native coronary artery without angina pectoris     Arteriosclerotic coronary artery disease    Chronic sinusitis, unspecified     Chronic congestion of paranasal sinus    Congenital heart disease     Coronary angioplasty status     History of percutaneous coronary intervention    Encounter for preprocedural cardiovascular examination     Pre-operative cardiovascular examination    Encounter for screening for malignant neoplasm of prostate     Screening PSA (prostate specific antigen)    Heart disease     Hypertension     Lyme disease, unspecified 2013    Lyme disease    Other cervical disc displacement, unspecified cervical region     Herniated cervical disc without myelopathy    Other specified postprocedural states     History of carpal tunnel surgery    Other systemic sclerosis     Cutaneous scleroderma    Personal history of malignant neoplasm, unspecified     History of malignant neoplasm    Personal history of other diseases of the circulatory system     History of hypertension    Personal history of other diseases of the circulatory system     History of abnormal electrocardiography     Personal history of other diseases of the digestive system     History of gastroesophageal reflux (GERD)    Personal history of other diseases of the musculoskeletal system and connective tissue     History of low back pain    Personal history of other diseases of the musculoskeletal system and connective tissue     History of spinal stenosis    Personal history of other diseases of the musculoskeletal system and connective tissue     History of arthritis    Personal history of other endocrine, nutritional and metabolic disease     History of hypothyroidism    Personal history of other endocrine, nutritional and metabolic disease     History of hyperlipidemia    Personal history of other endocrine, nutritional and metabolic disease     History of type 2 diabetes mellitus    Personal history of other endocrine, nutritional and metabolic disease     History of hypoglycemia    Personal history of other specified conditions     History of chest pain    Polyp of stomach and duodenum     Gastric polyp    Type 2 diabetes mellitus        Past Surgical History:   Procedure Laterality Date    COLONOSCOPY      CORONARY ANGIOPLASTY WITH STENT PLACEMENT  07/27/2018    Cath Placement Of Stent 1    HAND SURGERY  07/27/2018    Hand Surgery                                                                                                                                                          OTHER SURGICAL HISTORY  07/27/2018    Surgery    OTHER SURGICAL HISTORY  07/27/2018    Total Hip Replacement Left       Patient  has no history on file for sexual activity.    Family History   Problem Relation Name Age of Onset    Stroke Mother      Heart failure Mother      Other (hepatic cirrhosis) Father      Parkinsonism Brother         Allergies   Allergen Reactions    Gabapentin Dizziness    Morphine Dizziness and Nausea/vomiting    Primidone Nausea/vomiting    Penicillins Hives and Rash    Valacyclovir Rash       Prior to Admission  medications    Medication Sig Start Date End Date Taking? Authorizing Provider   Enid Pippa Plus test strp strip USE TO TEST ONCE DAILY. 4/11/23   Historical Provider, MD Rossi Fastclix Lancet Drum misc 1 Lancet early in the morning.. 4/11/23   Historical Provider, MD   amLODIPine (Norvasc) 10 mg tablet TAKE 1 TABLET BY MOUTH ONCE  DAILY 10/15/24   Baudilio Gonzalez DO   aspirin 81 mg EC tablet Take 1 tablet (81 mg) by mouth once daily.    Historical Provider, MD   clindamycin (Cleocin) 300 mg capsule Take 1 capsule (300 mg) by mouth. 1 HOUR BEFORE PROCEDURE AND 6 HOURS AFTER PROCEDURE AS DIRECTED 11/1/22   Historical Provider, MD   coenzyme Q-10 300 mg capsule capsule Take by mouth. 12/2/19   Historical Provider, MD   cyanocobalamin (Vitamin B-12) 100 mcg tablet Take by mouth. 12/2/19   Historical Provider, MD   diphenoxylate-atropine (Lomotil) 2.5-0.025 mg tablet Take 1 tablet by mouth once daily. With additional tablet in evening if needed 3/4/24   Baudilio Gonzalez DO   famotidine (Pepcid) 40 mg tablet TAKE 1 TABLET BY MOUTH TWICE  DAILY 10/15/24   Baudilio Gonzalez DO   fenofibrate (Tricor) 48 mg tablet Take 1 tablet (48 mg) by mouth once daily. 8/7/24 8/2/25  Baudilio Gonzalez DO   finasteride (Proscar) 5 mg tablet Take 1 tablet (5 mg) by mouth once daily.  Patient taking differently: Take 1.25 mg by mouth once daily. 10/25/23   Baudilio Gonzalez DO   gabapentin (Neurontin) 300 mg capsule Take 1 capsule (300 mg) by mouth once daily at bedtime. 11/20/24 11/15/25  Baudilio Gonzalez DO   glimepiride (Amaryl) 1 mg tablet Take by mouth. 11/22/13   Historical Provider, MD   glimepiride (Amaryl) 2 mg tablet Take 1 tablet (2 mg) by mouth 2 times a day. 4/1/24   Historical Provider, MD   hydroCHLOROthiazide (HYDRODiuril) 25 mg tablet Take 1 tablet (25 mg) by mouth once daily. 10/27/23   Baudilio Gonzalez,    lactobacillus acidophilus capsule Take 1 capsule by mouth once daily. With breakfast 8/27/22    Historical Provider, MD   levothyroxine (Synthroid, Levoxyl) 50 mcg tablet  3/10/24   Historical Provider, MD   Livalo 4 mg tablet  4/8/24   Historical Provider, MD   losartan (Cozaar) 50 mg tablet Take 1 tablet (50 mg) by mouth once daily. 2/4/22   Historical Provider, MD   metoprolol tartrate (Lopressor) 25 mg tablet TAKE 1 TABLET BY MOUTH TWICE  DAILY 7/2/24   Christian Webb, PAWilliamC   multivitamin capsule Take by mouth.    Historical Provider, MD   nitroglycerin (Nitrostat) 0.4 mg SL tablet Place 1 tablet (0.4 mg) under the tongue every 5 minutes if needed for chest pain. Under the tongue as needed for chest pain 10/9/23   Anil Lindsey MD   [id number]-[id number]-epa-fish oil 720-1,200 mg capsule,delayed release(DR/EC) Take by mouth. 12/2/19   Historical Provider, MD   potassium chloride CR (K-Tab) 20 mEq ER tablet Take by mouth.    Historical Provider, MD   primidone (Mysoline) 50 mg tablet Take 1 tablet (50 mg) by mouth once daily. 3/4/24 3/4/25  Baudilio Gonzalez,    psyllium (Metamucil) powder Take by mouth.    Historical Provider, MD   vit A/vit C/vit E/zinc/copper (PRESERVISION AREDS ORAL) 1 caps, ORAL, BID, Refill(s) 0, Date: 4/5/24 4:20:00 PM EDT 4/5/24   Historical Provider, MD   vit C/zinc citrate/elderberry (SAMBUCUS ELDERBERRY ORAL) Take by mouth.    Historical Provider, MD        Constitutional: Negative for fever, chills, or sweats   ENMT: Negative for nasal discharge, congestion, ear pain, mouth pain, throat pain. Positive for sinus congestion-in the afternoon-chronic.   Respiratory: Negative for cough, wheezing, shortness of breath   Cardiac: Negative for chest pain, dyspnea on exertion, palpitations   Gastrointestinal: Negative for nausea, vomiting, diarrhea, constipation, abdominal pain  Genitourinary: Negative for dysuria, flank pain, frequency, hematuria   Musculoskeletal: Negative for decreased ROM, pain, swelling, weakness. See HPI.  Neurological: Negative for dizziness, confusion,  headache  Psychiatric: Negative for mood changes   Skin: Negative for itching, rash, ulcer    Hematologic/Lymph: Negative for bruising, easy bleeding  Allergic/Immunologic: Negative itching, sneezing, swelling      Physical Exam  Vitals reviewed.   Constitutional:       Appearance: Normal appearance.   HENT:      Head: Normocephalic.      Mouth/Throat:      Mouth: Mucous membranes are moist.      Pharynx: Oropharynx is clear.   Eyes:      Pupils: Pupils are equal, round, and reactive to light.   Cardiovascular:      Rate and Rhythm: Normal rate and regular rhythm.      Heart sounds: Murmur (2/6) heard.   Pulmonary:      Effort: Pulmonary effort is normal.      Breath sounds: Normal breath sounds.   Abdominal:      General: Bowel sounds are normal.      Palpations: Abdomen is soft.   Musculoskeletal:         General: Normal range of motion.      Cervical back: Normal range of motion.      Comments: Generalized weakness noted    Skin:     General: Skin is warm and dry.   Neurological:      General: No focal deficit present.      Mental Status: He is alert and oriented to person, place, and time.   Psychiatric:         Mood and Affect: Mood normal.         Behavior: Behavior normal.          PAT AIRWAY:   Airway:     Mallampati::  II    Neck ROM::  Limited  normal        Testing/Diagnostic:   Echo from 10/30/24: CONCLUSIONS:   1. Left ventricular ejection fraction is normal, by visual estimate at 60-65%.   2. Left ventricular diastolic filling was indeterminate.   3. There is moderate concentric left ventricular hypertrophy.   4. There is normal right ventricular global systolic function.   5. The left atrium is moderately dilated.   6. Mild to moderate mitral valve regurgitation.   7. Right ventricular systolic pressure is within normal limits.   8. Severe aortic valve stenosis.   9. There is moderate to severe aortic valve thickening.  10. Mild aortic valve regurgitation.  11. There is mild to moderate pulmonic valve  "regurgitation.    Patient Specialist/PCP:   PCP: Dr. Gonzalez  Cardiology: Dr. Lindsey     Visit Vitals  /65   Pulse 75   Temp 36 °C (96.8 °F) (Temporal)   Resp 16   Ht 1.727 m (5' 8\")   Wt 84.9 kg (187 lb 2.7 oz)   SpO2 97%   BMI 28.46 kg/m²   Smoking Status Never   BSA 2.02 m²       DASI Risk Score      Flowsheet Row Pre-Admission Testing from 1/21/2025 in [facility]   Can you take care of yourself (eat, dress, bathe, or use toilet)?  2.75 filed at 01/20/2025 1311   Can you walk indoors, such as around your house? 1.75 filed at 01/20/2025 1311   Can you walk a block or two on level ground?  0 filed at 01/20/2025 1311   Can you climb a flight of stairs or walk up a hill? 0 filed at 01/20/2025 1311   Can you run a short distance? 0 filed at 01/20/2025 1311   Can you do light work around the house like dusting or washing dishes? 2.7 filed at 01/20/2025 1311   Can you do moderate work around the house like vacuuming, sweeping floors or carrying groceries? 0 filed at 01/20/2025 1311   Can you do heavy work around the house like scrubbing floors or lifting and moving heavy furniture?  0 filed at 01/20/2025 1311   Can you do yard work like raking leaves, weeding or pushing a mower? 0 filed at 01/20/2025 1311   Can you have sexual relations? 0 filed at 01/20/2025 1311   Can you participate in moderate recreational activities like golf, bowling, dancing, doubles tennis or throwing a baseball or football? 0 filed at 01/20/2025 1311   Can you participate in strenous sports like swimming, singles tennis, football, basketball, or skiing? 0 filed at 01/20/2025 1311   DASI SCORE 7.2 filed at 01/20/2025 1311   METS Score (Will be calculated only when all the questions are answered) 3.6 filed at 01/20/2025 1311          Caprini DVT Assessment      Flowsheet Row Pre-Admission Testing from 1/21/2025 in [facility]   DVT Score (IF A SCORE IS NOT CALCULATING, MUST SELECT A BMI TO COMPLETE) 12 filed " at 01/20/2025 1310   Medical Factors History of DVT/PE filed at 01/20/2025 1310   Surgical Factors Major surgery planned, lasting over 3 hours filed at 01/20/2025 1310   BMI (BMI MUST BE CHOSEN) 30 or less filed at 01/20/2025 1310          Modified Frailty Index      Flowsheet Row Pre-Admission Testing from 1/21/2025 in [facility]   Non-independent functional status (problems with dressing, bathing, personal grooming, or cooking) 0 filed at 01/20/2025 1312   History of diabetes mellitus  0.0909 filed at 01/20/2025 1312   History of COPD 0 filed at 01/20/2025 1312   History of CHF 0.0909 filed at 01/20/2025 1312   History of MI 0 filed at 01/20/2025 1312   History of Percutaneous Coronary Intervention, Cardiac Surgery, or Angina 0.0909 filed at 01/20/2025 1312   Hypertension requiring the use of medication  0.0909 filed at 01/20/2025 1312   Peripheral vascular disease 0.0909 filed at 01/20/2025 1312   Impaired sensorium (cognitive impairement or loss, clouding, or delirium) 0 filed at 01/20/2025 1312   TIA or CVA withouy residual deficit 0 filed at 01/20/2025 1312   Cerebrovascular accident with deficit 0 filed at 01/20/2025 1312   Modified Frailty Index Calculator .4545 filed at 01/20/2025 1312          CHADS2 Stroke Risk  Current as of 19 minutes ago        N/A 3 to 100%: High Risk   2 to < 3%: Medium Risk   0 to < 2%: Low Risk     Last Change: N/A          This score determines the patient's risk of having a stroke if the patient has atrial fibrillation.        This score is not applicable to this patient. Components are not calculated.          Revised Cardiac Risk Index      Flowsheet Row Pre-Admission Testing from 1/21/2025 in [facility]   High-Risk Surgery (Intraperitoneal, Intrathoracic,Suprainguinal vascular) 0 filed at 01/20/2025 1312   History of ischemic heart disease (History of MI, History of positive exercuse test, Current chest paint considered due to myocardial  ischemia, Use of nitrate therapy, ECG with pathological Q Waves) 1 filed at 2025 1312   History of congestive heart failure (pulmonary edemia, bilateral rales or S3 gallop, Paroxysmal nocturnal dyspnea, CXR showing pulmonary vascular redistribution) 1 filed at 2025 1312   History of cerebrovascular disease (Prior TIA or stroke) 0 filed at 2025 1312   Pre-operative insulin treatment 0 filed at 2025 1312   Pre-operative creatinine>2 mg/dl 0 filed at 2025 1312   Revised Cardiac Risk Calculator 2 filed at 2025 1312          Apfel Simplified Score      Flowsheet Row Pre-Admission Testing from 2025 in [facility]   Smoking status 1 filed at 2025 1312   History of motion sickness or PONV  0 filed at 2025 1312   Use of postoperative opioids 0 filed at 2025 1312   Gender - Female 0=No filed at 2025 1312   Apfel Simplified Score Calculator 1 filed at 2025 1312          Risk Analysis Index Results This Encounter         2025  1312             Do you live in a place other than your own home?: 0    When did you begin living in the place you are currently residing?: Greater than one year ago    Any kidney failure, kidney not working well, or seeing a kidney doctor (nephrologist)? If yes, was this for kidney stones or another problem?: 0 No    Any history of chronic (long-term) congestive heart failure (CHF)?: 0 No    Any shortness of breath when resting?: 0 No    In the past five years, have you been diagnosed with or treated for cancer?: No    During the last 3 months has it become difficult for you to remember things or organize your thoughts?: 0 No    Have you lost weight of 10 pounds or more in the past 3 months without trying?: 0 No    Do you have any loss of appetitie?: 0 No    Getting Around (Mobility): 1 Needs help from cane, walker, or scooter    Eatin Can plan and prepare own meals    Toiletin Can use toilet without any  help    Personal Hygiene (Bathing, Hand Washing, Changing Clothes): 0 Can shower or bathe without any help    HUBER Cancer History: Patient does not indicate history of cancer    Total Risk Analysis Index Score Without Cancer: 30    Total Risk Analysis Index Score: 30          Stop Bang Score      Flowsheet Row [facility]   Do you snore loudly? 0 filed at 01/20/2025 1311   Do you often feel tired or fatigued after your sleep? 0 filed at 01/20/2025 1311   Has anyone ever observed you stop breathing in your sleep? 0 filed at 01/20/2025 1311   Do you have or are you being treated for high blood pressure? 1 filed at 01/20/2025 1311   Recent BMI (Calculated) 28 filed at 01/20/2025 1311   Is BMI greater than 35 kg/m2? 0=No filed at 01/20/2025 1311   Age older than 50 years old? 1=Yes filed at 01/20/2025 1311   Is your neck circumference greater than 17 inches (Male) or 16 inches (Female)? 0 filed at 01/20/2025 1311   Gender - Male 1=Yes filed at 01/20/2025 1311   STOP-BANG Total Score 3 filed at 01/20/2025 1311          Prodigy: High Risk  Total Score: 31              Prodigy Age Score      Prodigy Gender Score     Prodigy CHF score          ARISCAT Score for Postoperative Pulmonary Complications      Flowsheet Row [facility]   Age Calculated Score 16 filed at 01/20/2025 1315   Preoperative SpO2 8 filed at 01/20/2025 1315   Respiratory infection in the last month Either upper or lower (i.e., URI, bronchitis, pneumonia), with fever and antibiotic treatment 0 filed at 01/20/2025 1315   Preoperative anemia (Hgb less than 10 g/dl) 0 filed at 01/20/2025 1315   Surgical incision  0 filed at 01/20/2025 1315   Duration of surgery  16 filed at 01/20/2025 1315   Emergency Procedure  0 filed at 01/20/2025 1315   ARISCAT Total Score  40 filed at 01/20/2025 1315          Carlson Perioperative Risk for Myocardial Infarction or  Cardiac Arrest (SAL)      Flowsheet Row Pre-Admission Testing from 1/21/2025 in [facility]   Calculated Age Score 1.64 filed at 01/20/2025 1315   Functional Status  0 filed at 01/20/2025 1315   ASA Class  -3.29 filed at 01/20/2025 1315   Creatinine 0 filed at 01/20/2025 1315   Type of Procedure  0.21 filed at 01/20/2025 1315   SAL Total Score  -6.69 filed at 01/20/2025 1315   SAL % 0.12 filed at 01/20/2025 1315            Assessment and Plan:     Assessment and Plan:     Preop:   OR with Dr. Church on 1/30/25 for L4-S1 decompression   Labs ordered per Dr. Church.   EKG obtained and enclosed. SR with occasional PVCs. RBBB. Comparable EKG on file.     EKG faxed to Dr. Lindsey and cardiac clearance requested.     Neurologic:   Resting tremor: No acute concerns   The patient is at an increased risk for post operative delirium secondary to age >/=65 , decrease functional status, polypharmacy , and type and duration of surgery . Preoperative brain exercise educational handout provided to patient.  The patient is at an increased risk for perioperative stroke secondary to cardiac disease, increased age, HTN, HLD, DM , and general anesthesia.    Cardiac:  Pericarditis: In 2022-resolved with treatment   Hyperlipidemia: On statin   Hypertension: Controlled with medical management   Diastolic CHF: Stable. No acute concerns.   Severe aortic stenosis: Per patient-will likely need a valve in the future.   CAD: Stent in 2017. Takes baby aspirin daily   Follows with cardiology-Dr. Lindsey   Rainey Activity Status Index (DASI)  DASI Score: 7.2   MET Score: 3.6  RCRI  >/=3 which is 15% 30 day risk of MACE (risk for cardiac death, nonfatal myocardial infarction, and nonfactal cardiac arrest)  SAL score which indicates a   0.12% risk of intraoperative or 30-day postoperative MACE    Pulmonary:   STOP-BANG score of  3 . Intermediate  risk of obstructive sleep apnea.   ARISCAT:   40  points which is a intermediate (13.3%)  risk of in-hospital post-op pulmonary complications     Endocrine:  Hypothyroidism: On levothyroxine   DM II: Last A1c was 5.9. Dr. Welsh-independent endocrinologist. On oral medication and sliding scale if needed. He does not normally need sliding scale     GI:  Apfel: 1 points 21% risk for post operative N/V    /Renal:   Renal mass: Follows with urology-under surveillance presently     Neuro-muscular:   Spinal stenosis: Reason for upcoming procedure   Osteoarthritis: S/P total hip replacement     Hematologic:   Caprini score 12, patient at high risk for perioperative DVT. Patient provided with VTE education/handout.     Skin check: Patient was instructed to make surgeon aware of any skin changes/concerns prior to surgery.     Anesthesia: No history of anesthesia complications. No anesthesia concerns.      *See risk scores as previously documented

## 2025-01-21 NOTE — PREPROCEDURE INSTRUCTIONS
Medication List            Accurate as of January 21, 2025 11:05 AM. Always use your most recent med list.                Accu-Chek Pippa Plus test strp strip  Generic drug: blood sugar diagnostic     Accu-Chek Fastclix Lancet Drum misc  Generic drug: lancets     amLODIPine 10 mg tablet  Commonly known as: Norvasc  TAKE 1 TABLET BY MOUTH ONCE  DAILY  Medication Adjustments for Surgery: Take/Use as prescribed     aspirin 81 mg chewable tablet  Additional Medication Adjustments for Surgery: Other (Comment)  Notes to patient: Coordinate with prescribing provider for further instructions on this medications prior to surgery.     chlorhexidine 0.12 % solution  Commonly known as: Peridex  15 milliliter(s) orally once a day for 2 doses 15 ml  the night before surgery and 15 ml morning of surgery - swish for 30 seconds -DO NOT SWALLOW, SPIT OUT     clindamycin 300 mg capsule  Commonly known as: Cleocin     coenzyme Q-10 300 mg capsule capsule  Additional Medication Adjustments for Surgery: Take last dose 7 days before surgery     diphenoxylate-atropine 2.5-0.025 mg tablet  Commonly known as: Lomotil  Take 1 tablet by mouth once daily. With additional tablet in evening if needed  Additional Medication Adjustments for Surgery: Other (Comment)  Notes to patient: HOLD the day of surgery     famotidine 40 mg tablet  Commonly known as: Pepcid  TAKE 1 TABLET BY MOUTH TWICE  DAILY  Medication Adjustments for Surgery: Take/Use as prescribed     fenofibrate 48 mg tablet  Commonly known as: Tricor  Take 1 tablet (48 mg) by mouth once daily.  Additional Medication Adjustments for Surgery: Other (Comment)  Notes to patient: HOLD 24 hours before day of surgery and the day of surgery     finasteride 5 mg tablet  Commonly known as: Proscar  Take 1 tablet (5 mg) by mouth once daily.  Medication Adjustments for Surgery: Take/Use as prescribed     gabapentin 300 mg capsule  Commonly known as: Neurontin  Take 1 capsule (300 mg) by mouth  once daily at bedtime.  Medication Adjustments for Surgery: Take/Use as prescribed     * glimepiride 1 mg tablet  Commonly known as: Amaryl  Medication Adjustments for Surgery: Take last dose 1 day (24 hours) before surgery     * glimepiride 2 mg tablet  Commonly known as: Amaryl  Medication Adjustments for Surgery: Take last dose 1 day (24 hours) before surgery     HUMALOG KWIKPEN INSULIN SUBQ     hydroCHLOROthiazide 25 mg tablet  Commonly known as: HYDRODiuril  Take 1 tablet (25 mg) by mouth once daily.  Medication Adjustments for Surgery: Take/Use as prescribed     lactobacillus acidophilus capsule  Additional Medication Adjustments for Surgery: Take last dose 7 days before surgery     * levothyroxine 50 mcg tablet  Commonly known as: Synthroid, Levoxyl  Medication Adjustments for Surgery: Take/Use as prescribed     * levothyroxine 50 mcg tablet  Commonly known as: Synthroid, Levoxyl  Medication Adjustments for Surgery: Take/Use as prescribed     Livalo 4 mg tablet  Generic drug: pitavastatin calcium  Medication Adjustments for Surgery: Take/Use as prescribed     losartan 50 mg tablet  Commonly known as: Cozaar  Additional Medication Adjustments for Surgery: Other (Comment)  Notes to patient: HOLD any evening dose the night before the day of surgery  HOLD the day of surgery     metoprolol tartrate 25 mg tablet  Commonly known as: Lopressor  TAKE 1 TABLET BY MOUTH TWICE  DAILY  Medication Adjustments for Surgery: Take/Use as prescribed     multivitamin tablet  Additional Medication Adjustments for Surgery: Take last dose 7 days before surgery     nitroglycerin 0.4 mg SL tablet  Commonly known as: Nitrostat  Place 1 tablet (0.4 mg) under the tongue every 5 minutes if needed for chest pain. Under the tongue as needed for chest pain  Medication Adjustments for Surgery: Take/Use as prescribed     omega-3s-dha-epa-fish oil 720-1,200 mg capsule,delayed release(DR/EC)  Additional Medication Adjustments for Surgery: Take  last dose 7 days before surgery     potassium chloride CR 20 mEq ER tablet  Commonly known as: Klor-Con M20  Medication Adjustments for Surgery: Take/Use as prescribed     PRESERVISION AREDS-2 ORAL  Additional Medication Adjustments for Surgery: Take last dose 7 days before surgery     psyllium powder  Commonly known as: Metamucil  Medication Adjustments for Surgery: Take last dose 1 day (24 hours) before surgery     SAMBUCUS ELDERBERRY ORAL  Additional Medication Adjustments for Surgery: Take last dose 7 days before surgery     VITAMIN B12-FOLIC ACID SL  Additional Medication Adjustments for Surgery: Take last dose 7 days before surgery           * This list has 4 medication(s) that are the same as other medications prescribed for you. Read the directions carefully, and ask your doctor or other care provider to review them with you.                      PRE-OPERATIVE INSTRUCTIONS    You will receive notification one business day prior to your procedure to confirm your arrival time. It is important that you answer your phone and/or check your messages during this time. If you do not hear from the surgery center by 5 pm. the day before your procedure, please call [phone].     Please enter the building through the Outpatient entrance and take the elevator off the lobby to the 2nd floor then check in at the Outpatient Surgery desk on the 2nd floor.    INSTRUCTIONS:  Talk to your surgeon for instructions if you should stop your aspirin, blood thinner, or diabetes medicines.  DO NOT take any multivitamins or over the counter supplements for 7-10 days before surgery.  If not being admitted, you must have an adult immediately available to drive you home after surgery. We also highly recommend you have someone stay with you for the entire day and night of your surgery.  For children having surgery, a parent or legal guardian must accompany them to the surgery center. If this is not possible, please call [phone] to  make additional arrangements.  For adults who are unable to consent or make medical decisions for themselves, a legal guardian or Power of  must accompany them to the surgery center. If this is not possible, please call [phone] to make additional arrangements.  Wear comfortable, loose fitting clothing.  All jewelry and piercings must be removed. If you are unable to remove an item or have a dermal piercing, please be sure to tell the nurse when you arrive for surgery.  Nail polish and make-up must be removed.  Avoid smoking or consuming alcohol for 24 hours before surgery.  To help prevent infection, please take a shower/bath and wash your hair the night before and/or morning of surgery (or follow other specific bathing instructions provided).    Preoperative Fasting Guidelines    Why must I stop eating and drinking near surgery time?  With sedation, food or liquid in your stomach can enter your lungs causing serious complications  Increases nausea and vomiting    When do I need to stop eating and drinking before my surgery?  Do not eat any solid food after midnight the night before your surgery/procedure unless otherwise instructed by your surgeon.   You may have up to 13.5 ounces of clear liquid until TWO hours before your instructed arrival time to the hospital.  This includes water, black tea/coffee, (no milk or cream) apple juice, and electrolyte drinks (Gatorade).   You may chew gum until TWO hours before your surgery/procedure      If applicable, notify your surgeons office immediately of any new skin changes that occur to the surgical limb.      If you have any questions or concerns, please call Pre-Admission Testing at [phone].

## 2025-01-22 LAB
ATRIAL RATE: 75 BPM
P OFFSET: 182 MS
P ONSET: 127 MS
PR INTERVAL: 166 MS
Q ONSET: 210 MS
QRS COUNT: 13 BEATS
QRS DURATION: 136 MS
QT INTERVAL: 484 MS
QTC CALCULATION(BAZETT): 540 MS
QTC FREDERICIA: 521 MS
R AXIS: -23 DEGREES
T AXIS: -8 DEGREES
T OFFSET: 452 MS
VENTRICULAR RATE: 75 BPM

## 2025-01-23 LAB — STAPHYLOCOCCUS SPEC CULT: NORMAL

## 2025-01-27 ENCOUNTER — TELEPHONE (OUTPATIENT)
Dept: CARDIOLOGY | Facility: CLINIC | Age: 85
End: 2025-01-27
Payer: MEDICARE

## 2025-01-27 NOTE — TELEPHONE ENCOUNTER
"Dr. Lindsey requesting to see patient prior to upcoming surgery to see if patient is experiencing any symptoms/how he is doing. Spoke to patient, states he is asymptomatic and \"feels great\". Made Dr. Lindsey aware.   "

## 2025-01-29 PROBLEM — R29.818 NEUROGENIC CLAUDICATION: Status: ACTIVE | Noted: 2025-01-29

## 2025-01-30 ENCOUNTER — HOSPITAL ENCOUNTER (OUTPATIENT)
Facility: HOSPITAL | Age: 85
Discharge: HOME | End: 2025-01-31
Attending: STUDENT IN AN ORGANIZED HEALTH CARE EDUCATION/TRAINING PROGRAM | Admitting: STUDENT IN AN ORGANIZED HEALTH CARE EDUCATION/TRAINING PROGRAM
Payer: MEDICARE

## 2025-01-30 ENCOUNTER — APPOINTMENT (OUTPATIENT)
Dept: RADIOLOGY | Facility: HOSPITAL | Age: 85
End: 2025-01-30
Payer: MEDICARE

## 2025-01-30 ENCOUNTER — ANESTHESIA EVENT (OUTPATIENT)
Dept: OPERATING ROOM | Facility: HOSPITAL | Age: 85
End: 2025-01-30
Payer: MEDICARE

## 2025-01-30 ENCOUNTER — ANESTHESIA (OUTPATIENT)
Dept: OPERATING ROOM | Facility: HOSPITAL | Age: 85
End: 2025-01-30
Payer: MEDICARE

## 2025-01-30 DIAGNOSIS — I73.9 PVD (PERIPHERAL VASCULAR DISEASE) (CMS-HCC): ICD-10-CM

## 2025-01-30 DIAGNOSIS — M48.061 SPINAL STENOSIS OF LUMBAR REGION, UNSPECIFIED WHETHER NEUROGENIC CLAUDICATION PRESENT: ICD-10-CM

## 2025-01-30 DIAGNOSIS — M54.16 LUMBAR RADICULOPATHY: Primary | ICD-10-CM

## 2025-01-30 DIAGNOSIS — G89.18 POST-OPERATIVE PAIN: ICD-10-CM

## 2025-01-30 DIAGNOSIS — R29.818 NEUROGENIC CLAUDICATION: ICD-10-CM

## 2025-01-30 LAB
GLUCOSE BLD MANUAL STRIP-MCNC: 153 MG/DL (ref 74–99)
GLUCOSE BLD MANUAL STRIP-MCNC: 172 MG/DL (ref 74–99)
GLUCOSE BLD MANUAL STRIP-MCNC: 194 MG/DL (ref 74–99)
GLUCOSE BLD MANUAL STRIP-MCNC: 347 MG/DL (ref 74–99)

## 2025-01-30 PROCEDURE — A63047 PR LAMINEC/FACETECT/FORAMIN,LUMBAR 1 SEG: Performed by: ANESTHESIOLOGY

## 2025-01-30 PROCEDURE — A6010 COLLAGEN BASED WOUND FILLER: HCPCS | Performed by: STUDENT IN AN ORGANIZED HEALTH CARE EDUCATION/TRAINING PROGRAM

## 2025-01-30 PROCEDURE — 2500000002 HC RX 250 W HCPCS SELF ADMINISTERED DRUGS (ALT 637 FOR MEDICARE OP, ALT 636 FOR OP/ED)

## 2025-01-30 PROCEDURE — 3700000002 HC GENERAL ANESTHESIA TIME - EACH INCREMENTAL 1 MINUTE: Performed by: STUDENT IN AN ORGANIZED HEALTH CARE EDUCATION/TRAINING PROGRAM

## 2025-01-30 PROCEDURE — 2500000001 HC RX 250 WO HCPCS SELF ADMINISTERED DRUGS (ALT 637 FOR MEDICARE OP): Performed by: STUDENT IN AN ORGANIZED HEALTH CARE EDUCATION/TRAINING PROGRAM

## 2025-01-30 PROCEDURE — 2500000004 HC RX 250 GENERAL PHARMACY W/ HCPCS (ALT 636 FOR OP/ED): Performed by: ANESTHESIOLOGIST ASSISTANT

## 2025-01-30 PROCEDURE — 7100000002 HC RECOVERY ROOM TIME - EACH INCREMENTAL 1 MINUTE: Performed by: STUDENT IN AN ORGANIZED HEALTH CARE EDUCATION/TRAINING PROGRAM

## 2025-01-30 PROCEDURE — 63048 LAM FACETEC &FORAMOT EA ADDL: CPT | Performed by: STUDENT IN AN ORGANIZED HEALTH CARE EDUCATION/TRAINING PROGRAM

## 2025-01-30 PROCEDURE — 2500000002 HC RX 250 W HCPCS SELF ADMINISTERED DRUGS (ALT 637 FOR MEDICARE OP, ALT 636 FOR OP/ED): Mod: MUE | Performed by: STUDENT IN AN ORGANIZED HEALTH CARE EDUCATION/TRAINING PROGRAM

## 2025-01-30 PROCEDURE — 2500000004 HC RX 250 GENERAL PHARMACY W/ HCPCS (ALT 636 FOR OP/ED): Performed by: STUDENT IN AN ORGANIZED HEALTH CARE EDUCATION/TRAINING PROGRAM

## 2025-01-30 PROCEDURE — 2500000004 HC RX 250 GENERAL PHARMACY W/ HCPCS (ALT 636 FOR OP/ED): Mod: JZ | Performed by: ANESTHESIOLOGY

## 2025-01-30 PROCEDURE — 63047 LAM FACETEC & FORAMOT LUMBAR: CPT | Performed by: STUDENT IN AN ORGANIZED HEALTH CARE EDUCATION/TRAINING PROGRAM

## 2025-01-30 PROCEDURE — 7100000011 HC EXTENDED STAY RECOVERY HOURLY - NURSING UNIT

## 2025-01-30 PROCEDURE — 36620 INSERTION CATHETER ARTERY: CPT | Performed by: ANESTHESIOLOGY

## 2025-01-30 PROCEDURE — A63047 PR LAMINEC/FACETECT/FORAMIN,LUMBAR 1 SEG: Performed by: ANESTHESIOLOGIST ASSISTANT

## 2025-01-30 PROCEDURE — 82947 ASSAY GLUCOSE BLOOD QUANT: CPT

## 2025-01-30 PROCEDURE — 7100000001 HC RECOVERY ROOM TIME - INITIAL BASE CHARGE: Performed by: STUDENT IN AN ORGANIZED HEALTH CARE EDUCATION/TRAINING PROGRAM

## 2025-01-30 PROCEDURE — 2780000003 HC OR 278 NO HCPCS: Performed by: STUDENT IN AN ORGANIZED HEALTH CARE EDUCATION/TRAINING PROGRAM

## 2025-01-30 PROCEDURE — 96372 THER/PROPH/DIAG INJ SC/IM: CPT | Performed by: STUDENT IN AN ORGANIZED HEALTH CARE EDUCATION/TRAINING PROGRAM

## 2025-01-30 PROCEDURE — S0109 METHADONE ORAL 5MG: HCPCS | Performed by: ANESTHESIOLOGY

## 2025-01-30 PROCEDURE — 3600000008 HC OR TIME - EACH INCREMENTAL 1 MINUTE - PROCEDURE LEVEL THREE: Performed by: STUDENT IN AN ORGANIZED HEALTH CARE EDUCATION/TRAINING PROGRAM

## 2025-01-30 PROCEDURE — 3600000003 HC OR TIME - INITIAL BASE CHARGE - PROCEDURE LEVEL THREE: Performed by: STUDENT IN AN ORGANIZED HEALTH CARE EDUCATION/TRAINING PROGRAM

## 2025-01-30 PROCEDURE — 2500000005 HC RX 250 GENERAL PHARMACY W/O HCPCS: Performed by: STUDENT IN AN ORGANIZED HEALTH CARE EDUCATION/TRAINING PROGRAM

## 2025-01-30 PROCEDURE — 2500000005 HC RX 250 GENERAL PHARMACY W/O HCPCS: Performed by: ANESTHESIOLOGIST ASSISTANT

## 2025-01-30 PROCEDURE — 2720000007 HC OR 272 NO HCPCS: Performed by: STUDENT IN AN ORGANIZED HEALTH CARE EDUCATION/TRAINING PROGRAM

## 2025-01-30 PROCEDURE — 99100 ANES PT EXTEME AGE<1 YR&>70: CPT | Performed by: ANESTHESIOLOGY

## 2025-01-30 PROCEDURE — 3700000001 HC GENERAL ANESTHESIA TIME - INITIAL BASE CHARGE: Performed by: STUDENT IN AN ORGANIZED HEALTH CARE EDUCATION/TRAINING PROGRAM

## 2025-01-30 PROCEDURE — 2500000002 HC RX 250 W HCPCS SELF ADMINISTERED DRUGS (ALT 637 FOR MEDICARE OP, ALT 636 FOR OP/ED): Performed by: ANESTHESIOLOGY

## 2025-01-30 PROCEDURE — 2500000005 HC RX 250 GENERAL PHARMACY W/O HCPCS: Performed by: ANESTHESIOLOGY

## 2025-01-30 RX ORDER — SODIUM CHLORIDE, SODIUM LACTATE, POTASSIUM CHLORIDE, CALCIUM CHLORIDE 600; 310; 30; 20 MG/100ML; MG/100ML; MG/100ML; MG/100ML
125 INJECTION, SOLUTION INTRAVENOUS CONTINUOUS
Status: DISCONTINUED | OUTPATIENT
Start: 2025-01-30 | End: 2025-01-30 | Stop reason: HOSPADM

## 2025-01-30 RX ORDER — LEVOTHYROXINE SODIUM 50 UG/1
50 TABLET ORAL
Status: DISCONTINUED | OUTPATIENT
Start: 2025-02-01 | End: 2025-01-31 | Stop reason: HOSPADM

## 2025-01-30 RX ORDER — ACETAMINOPHEN 325 MG/1
650 TABLET ORAL EVERY 4 HOURS PRN
Status: DISCONTINUED | OUTPATIENT
Start: 2025-01-30 | End: 2025-01-30 | Stop reason: HOSPADM

## 2025-01-30 RX ORDER — ONDANSETRON 4 MG/1
4 TABLET, FILM COATED ORAL EVERY 8 HOURS PRN
Status: DISCONTINUED | OUTPATIENT
Start: 2025-01-30 | End: 2025-01-31 | Stop reason: HOSPADM

## 2025-01-30 RX ORDER — CEFAZOLIN SODIUM 2 G/100ML
INJECTION, SOLUTION INTRAVENOUS AS NEEDED
Status: DISCONTINUED | OUTPATIENT
Start: 2025-01-30 | End: 2025-01-30

## 2025-01-30 RX ORDER — HYDROCODONE BITARTRATE AND ACETAMINOPHEN 5; 325 MG/1; MG/1
1 TABLET ORAL EVERY 4 HOURS PRN
Status: DISCONTINUED | OUTPATIENT
Start: 2025-01-30 | End: 2025-01-30 | Stop reason: HOSPADM

## 2025-01-30 RX ORDER — ONDANSETRON HYDROCHLORIDE 2 MG/ML
INJECTION, SOLUTION INTRAVENOUS AS NEEDED
Status: DISCONTINUED | OUTPATIENT
Start: 2025-01-30 | End: 2025-01-30

## 2025-01-30 RX ORDER — OXYCODONE HYDROCHLORIDE 5 MG/1
2.5 TABLET ORAL EVERY 4 HOURS PRN
Status: DISCONTINUED | OUTPATIENT
Start: 2025-01-30 | End: 2025-01-31 | Stop reason: HOSPADM

## 2025-01-30 RX ORDER — PROPOFOL 10 MG/ML
INJECTION, EMULSION INTRAVENOUS AS NEEDED
Status: DISCONTINUED | OUTPATIENT
Start: 2025-01-30 | End: 2025-01-30

## 2025-01-30 RX ORDER — SODIUM CHLORIDE, SODIUM LACTATE, POTASSIUM CHLORIDE, CALCIUM CHLORIDE 600; 310; 30; 20 MG/100ML; MG/100ML; MG/100ML; MG/100ML
100 INJECTION, SOLUTION INTRAVENOUS CONTINUOUS
Status: DISCONTINUED | OUTPATIENT
Start: 2025-01-30 | End: 2025-01-31 | Stop reason: HOSPADM

## 2025-01-30 RX ORDER — LEVOTHYROXINE SODIUM 100 UG/1
100 TABLET ORAL 2 TIMES WEEKLY
Status: DISCONTINUED | OUTPATIENT
Start: 2025-02-03 | End: 2025-01-31 | Stop reason: HOSPADM

## 2025-01-30 RX ORDER — ACETAMINOPHEN 325 MG/1
975 TABLET ORAL EVERY 8 HOURS
Status: DISCONTINUED | OUTPATIENT
Start: 2025-01-30 | End: 2025-01-31 | Stop reason: HOSPADM

## 2025-01-30 RX ORDER — OXYCODONE HYDROCHLORIDE 10 MG/1
10 TABLET ORAL EVERY 4 HOURS PRN
Status: DISCONTINUED | OUTPATIENT
Start: 2025-01-30 | End: 2025-01-31 | Stop reason: HOSPADM

## 2025-01-30 RX ORDER — HYDROCHLOROTHIAZIDE 25 MG/1
25 TABLET ORAL DAILY
Status: DISCONTINUED | OUTPATIENT
Start: 2025-01-31 | End: 2025-01-31 | Stop reason: HOSPADM

## 2025-01-30 RX ORDER — FINASTERIDE 5 MG/1
2.5 TABLET, FILM COATED ORAL DAILY
Status: DISCONTINUED | OUTPATIENT
Start: 2025-01-31 | End: 2025-01-31 | Stop reason: HOSPADM

## 2025-01-30 RX ORDER — FENTANYL CITRATE 50 UG/ML
INJECTION, SOLUTION INTRAMUSCULAR; INTRAVENOUS AS NEEDED
Status: DISCONTINUED | OUTPATIENT
Start: 2025-01-30 | End: 2025-01-30

## 2025-01-30 RX ORDER — OXYCODONE HYDROCHLORIDE 5 MG/1
5 TABLET ORAL EVERY 4 HOURS PRN
Status: DISCONTINUED | OUTPATIENT
Start: 2025-01-30 | End: 2025-01-31 | Stop reason: HOSPADM

## 2025-01-30 RX ORDER — HYDRALAZINE HYDROCHLORIDE 20 MG/ML
5 INJECTION INTRAMUSCULAR; INTRAVENOUS EVERY 30 MIN PRN
Status: DISCONTINUED | OUTPATIENT
Start: 2025-01-30 | End: 2025-01-30 | Stop reason: HOSPADM

## 2025-01-30 RX ORDER — PHENYLEPHRINE HCL IN 0.9% NACL 1 MG/10 ML
SYRINGE (ML) INTRAVENOUS AS NEEDED
Status: DISCONTINUED | OUTPATIENT
Start: 2025-01-30 | End: 2025-01-30

## 2025-01-30 RX ORDER — INSULIN LISPRO 100 [IU]/ML
0-5 INJECTION, SOLUTION INTRAVENOUS; SUBCUTANEOUS
Status: DISCONTINUED | OUTPATIENT
Start: 2025-01-31 | End: 2025-01-30

## 2025-01-30 RX ORDER — PHENYLEPHRINE 10 MG/250 ML(40 MCG/ML)IN 0.9 % SOD.CHLORIDE INTRAVENOUS
CONTINUOUS PRN
Status: DISCONTINUED | OUTPATIENT
Start: 2025-01-30 | End: 2025-01-30

## 2025-01-30 RX ORDER — BUPIVACAINE HCL/EPINEPHRINE 0.5-1:200K
VIAL (ML) INJECTION AS NEEDED
Status: DISCONTINUED | OUTPATIENT
Start: 2025-01-30 | End: 2025-01-30 | Stop reason: HOSPADM

## 2025-01-30 RX ORDER — LIDOCAINE HYDROCHLORIDE AND EPINEPHRINE 10; 10 UG/ML; MG/ML
INJECTION, SOLUTION INFILTRATION; PERINEURAL AS NEEDED
Status: DISCONTINUED | OUTPATIENT
Start: 2025-01-30 | End: 2025-01-30 | Stop reason: HOSPADM

## 2025-01-30 RX ORDER — CYCLOBENZAPRINE HCL 5 MG
5 TABLET ORAL 3 TIMES DAILY
Status: DISCONTINUED | OUTPATIENT
Start: 2025-01-30 | End: 2025-01-31 | Stop reason: HOSPADM

## 2025-01-30 RX ORDER — ROCURONIUM BROMIDE 50 MG/5 ML
SYRINGE (ML) INTRAVENOUS AS NEEDED
Status: DISCONTINUED | OUTPATIENT
Start: 2025-01-30 | End: 2025-01-30

## 2025-01-30 RX ORDER — ONDANSETRON HYDROCHLORIDE 2 MG/ML
4 INJECTION, SOLUTION INTRAVENOUS ONCE AS NEEDED
Status: DISCONTINUED | OUTPATIENT
Start: 2025-01-30 | End: 2025-01-30 | Stop reason: HOSPADM

## 2025-01-30 RX ORDER — HYDROMORPHONE HYDROCHLORIDE 0.2 MG/ML
0.1 INJECTION INTRAMUSCULAR; INTRAVENOUS; SUBCUTANEOUS EVERY 5 MIN PRN
Status: DISCONTINUED | OUTPATIENT
Start: 2025-01-30 | End: 2025-01-30 | Stop reason: HOSPADM

## 2025-01-30 RX ORDER — ACETAMINOPHEN 325 MG/1
975 TABLET ORAL ONCE
Status: COMPLETED | OUTPATIENT
Start: 2025-01-30 | End: 2025-01-30

## 2025-01-30 RX ORDER — DEXTROSE 50 % IN WATER (D50W) INTRAVENOUS SYRINGE
25
Status: DISCONTINUED | OUTPATIENT
Start: 2025-01-30 | End: 2025-01-31 | Stop reason: HOSPADM

## 2025-01-30 RX ORDER — POLYETHYLENE GLYCOL 3350 17 G/17G
17 POWDER, FOR SOLUTION ORAL 2 TIMES DAILY
Status: DISCONTINUED | OUTPATIENT
Start: 2025-01-30 | End: 2025-01-31 | Stop reason: HOSPADM

## 2025-01-30 RX ORDER — DEXTROSE 50 % IN WATER (D50W) INTRAVENOUS SYRINGE
12.5
Status: DISCONTINUED | OUTPATIENT
Start: 2025-01-30 | End: 2025-01-31 | Stop reason: HOSPADM

## 2025-01-30 RX ORDER — ALBUTEROL SULFATE 0.83 MG/ML
2.5 SOLUTION RESPIRATORY (INHALATION) ONCE AS NEEDED
Status: DISCONTINUED | OUTPATIENT
Start: 2025-01-30 | End: 2025-01-30 | Stop reason: HOSPADM

## 2025-01-30 RX ORDER — CELECOXIB 200 MG/1
400 CAPSULE ORAL ONCE
Status: COMPLETED | OUTPATIENT
Start: 2025-01-30 | End: 2025-01-30

## 2025-01-30 RX ORDER — AMLODIPINE BESYLATE 10 MG/1
10 TABLET ORAL DAILY
Status: DISCONTINUED | OUTPATIENT
Start: 2025-01-31 | End: 2025-01-31 | Stop reason: HOSPADM

## 2025-01-30 RX ORDER — METOPROLOL TARTRATE 25 MG/1
25 TABLET, FILM COATED ORAL 2 TIMES DAILY
Status: DISCONTINUED | OUTPATIENT
Start: 2025-01-30 | End: 2025-01-31 | Stop reason: HOSPADM

## 2025-01-30 RX ORDER — ONDANSETRON HYDROCHLORIDE 2 MG/ML
4 INJECTION, SOLUTION INTRAVENOUS EVERY 8 HOURS PRN
Status: DISCONTINUED | OUTPATIENT
Start: 2025-01-30 | End: 2025-01-31 | Stop reason: HOSPADM

## 2025-01-30 RX ORDER — INSULIN LISPRO 100 [IU]/ML
0-5 INJECTION, SOLUTION INTRAVENOUS; SUBCUTANEOUS
Status: DISCONTINUED | OUTPATIENT
Start: 2025-01-30 | End: 2025-01-31 | Stop reason: HOSPADM

## 2025-01-30 RX ORDER — NALOXONE HYDROCHLORIDE 0.4 MG/ML
0.2 INJECTION, SOLUTION INTRAMUSCULAR; INTRAVENOUS; SUBCUTANEOUS EVERY 5 MIN PRN
Status: DISCONTINUED | OUTPATIENT
Start: 2025-01-30 | End: 2025-01-31 | Stop reason: HOSPADM

## 2025-01-30 RX ORDER — ACETAMINOPHEN 325 MG/1
975 TABLET ORAL ONCE
Status: DISCONTINUED | OUTPATIENT
Start: 2025-01-30 | End: 2025-01-30 | Stop reason: HOSPADM

## 2025-01-30 RX ORDER — METHADONE HYDROCHLORIDE 10 MG/1
TABLET ORAL AS NEEDED
Status: DISCONTINUED | OUTPATIENT
Start: 2025-01-30 | End: 2025-01-30

## 2025-01-30 RX ORDER — TRANEXAMIC ACID 650 MG/1
1300 TABLET ORAL ONCE
Status: COMPLETED | OUTPATIENT
Start: 2025-01-30 | End: 2025-01-30

## 2025-01-30 RX ORDER — HYDROMORPHONE HYDROCHLORIDE 1 MG/ML
1 INJECTION, SOLUTION INTRAMUSCULAR; INTRAVENOUS; SUBCUTANEOUS EVERY 5 MIN PRN
Status: DISCONTINUED | OUTPATIENT
Start: 2025-01-30 | End: 2025-01-30 | Stop reason: HOSPADM

## 2025-01-30 RX ORDER — MIDAZOLAM HYDROCHLORIDE 1 MG/ML
INJECTION, SOLUTION INTRAMUSCULAR; INTRAVENOUS AS NEEDED
Status: DISCONTINUED | OUTPATIENT
Start: 2025-01-30 | End: 2025-01-30

## 2025-01-30 RX ORDER — NITROGLYCERIN 0.4 MG/1
0.4 TABLET SUBLINGUAL EVERY 5 MIN PRN
Status: DISCONTINUED | OUTPATIENT
Start: 2025-01-30 | End: 2025-01-31 | Stop reason: HOSPADM

## 2025-01-30 RX ORDER — OXYCODONE HYDROCHLORIDE 5 MG/1
5 TABLET ORAL EVERY 4 HOURS PRN
Status: DISCONTINUED | OUTPATIENT
Start: 2025-01-30 | End: 2025-01-30 | Stop reason: HOSPADM

## 2025-01-30 RX ORDER — LIDOCAINE 560 MG/1
1 PATCH PERCUTANEOUS; TOPICAL; TRANSDERMAL DAILY
Status: DISCONTINUED | OUTPATIENT
Start: 2025-01-30 | End: 2025-01-31 | Stop reason: HOSPADM

## 2025-01-30 RX ORDER — HYDROMORPHONE HYDROCHLORIDE 0.2 MG/ML
0.2 INJECTION INTRAMUSCULAR; INTRAVENOUS; SUBCUTANEOUS EVERY 4 HOURS PRN
Status: DISCONTINUED | OUTPATIENT
Start: 2025-01-30 | End: 2025-01-31 | Stop reason: HOSPADM

## 2025-01-30 RX ORDER — HEPARIN SODIUM 5000 [USP'U]/ML
5000 INJECTION, SOLUTION INTRAVENOUS; SUBCUTANEOUS EVERY 8 HOURS
Status: DISCONTINUED | OUTPATIENT
Start: 2025-01-30 | End: 2025-01-31 | Stop reason: HOSPADM

## 2025-01-30 RX ORDER — FAMOTIDINE 20 MG/1
40 TABLET, FILM COATED ORAL 2 TIMES DAILY
Status: DISCONTINUED | OUTPATIENT
Start: 2025-01-30 | End: 2025-01-31 | Stop reason: HOSPADM

## 2025-01-30 RX ORDER — ESMOLOL HYDROCHLORIDE 10 MG/ML
INJECTION INTRAVENOUS AS NEEDED
Status: DISCONTINUED | OUTPATIENT
Start: 2025-01-30 | End: 2025-01-30

## 2025-01-30 RX ORDER — BISACODYL 5 MG
10 TABLET, DELAYED RELEASE (ENTERIC COATED) ORAL DAILY PRN
Status: DISCONTINUED | OUTPATIENT
Start: 2025-01-30 | End: 2025-01-31 | Stop reason: HOSPADM

## 2025-01-30 RX ORDER — LIDOCAINE HYDROCHLORIDE 20 MG/ML
INJECTION, SOLUTION EPIDURAL; INFILTRATION; INTRACAUDAL; PERINEURAL AS NEEDED
Status: DISCONTINUED | OUTPATIENT
Start: 2025-01-30 | End: 2025-01-30

## 2025-01-30 RX ORDER — LOSARTAN POTASSIUM 50 MG/1
50 TABLET ORAL DAILY
Status: DISCONTINUED | OUTPATIENT
Start: 2025-01-30 | End: 2025-01-31 | Stop reason: HOSPADM

## 2025-01-30 RX ADMIN — CYCLOBENZAPRINE HYDROCHLORIDE 5 MG: 5 TABLET, FILM COATED ORAL at 20:26

## 2025-01-30 RX ADMIN — HEPARIN SODIUM 5000 UNITS: 5000 INJECTION, SOLUTION INTRAVENOUS; SUBCUTANEOUS at 18:15

## 2025-01-30 RX ADMIN — PROPOFOL 100 MG: 10 INJECTION, EMULSION INTRAVENOUS at 13:31

## 2025-01-30 RX ADMIN — CEFAZOLIN SODIUM 2 G: 2 INJECTION, SOLUTION INTRAVENOUS at 13:41

## 2025-01-30 RX ADMIN — ESMOLOL HYDROCHLORIDE 30 MG: 10 INJECTION, SOLUTION INTRAVENOUS at 13:44

## 2025-01-30 RX ADMIN — HYDROMORPHONE HYDROCHLORIDE 0.5 MG: 1 INJECTION, SOLUTION INTRAMUSCULAR; INTRAVENOUS; SUBCUTANEOUS at 16:09

## 2025-01-30 RX ADMIN — SODIUM CHLORIDE, POTASSIUM CHLORIDE, SODIUM LACTATE AND CALCIUM CHLORIDE 100 ML/HR: 600; 310; 30; 20 INJECTION, SOLUTION INTRAVENOUS at 18:21

## 2025-01-30 RX ADMIN — OXYCODONE HYDROCHLORIDE 5 MG: 5 TABLET ORAL at 20:26

## 2025-01-30 RX ADMIN — ONDANSETRON 4 MG: 2 INJECTION INTRAMUSCULAR; INTRAVENOUS at 15:12

## 2025-01-30 RX ADMIN — Medication 0.5 MCG/KG/MIN: at 14:23

## 2025-01-30 RX ADMIN — POVIDONE-IODINE 1 APPLICATION: 5 SOLUTION TOPICAL at 11:36

## 2025-01-30 RX ADMIN — PROPOFOL 40 MG: 10 INJECTION, EMULSION INTRAVENOUS at 13:34

## 2025-01-30 RX ADMIN — MIDAZOLAM 0.5 MG: 1 INJECTION INTRAMUSCULAR; INTRAVENOUS at 13:13

## 2025-01-30 RX ADMIN — LIDOCAINE HYDROCHLORIDE 60 MG: 20 INJECTION, SOLUTION EPIDURAL; INFILTRATION; INTRACAUDAL; PERINEURAL at 13:31

## 2025-01-30 RX ADMIN — INSULIN LISPRO 4 UNITS: 100 INJECTION, SOLUTION INTRAVENOUS; SUBCUTANEOUS at 21:25

## 2025-01-30 RX ADMIN — MIDAZOLAM 1.5 MG: 1 INJECTION INTRAMUSCULAR; INTRAVENOUS at 13:31

## 2025-01-30 RX ADMIN — METHADONE HYDROCHLORIDE 10 MG: 10 TABLET ORAL at 12:56

## 2025-01-30 RX ADMIN — SODIUM CHLORIDE, POTASSIUM CHLORIDE, SODIUM LACTATE AND CALCIUM CHLORIDE: 600; 310; 30; 20 INJECTION, SOLUTION INTRAVENOUS at 13:06

## 2025-01-30 RX ADMIN — PROPOFOL 60 MG: 10 INJECTION, EMULSION INTRAVENOUS at 13:37

## 2025-01-30 RX ADMIN — FENTANYL CITRATE 50 MCG: 50 INJECTION, SOLUTION INTRAMUSCULAR; INTRAVENOUS at 13:55

## 2025-01-30 RX ADMIN — METOPROLOL TARTRATE 25 MG: 25 TABLET, FILM COATED ORAL at 20:26

## 2025-01-30 RX ADMIN — TRANEXAMIC ACID 1300 MG: 650 TABLET ORAL at 11:34

## 2025-01-30 RX ADMIN — HYDROMORPHONE HYDROCHLORIDE 0.5 MG: 1 INJECTION, SOLUTION INTRAMUSCULAR; INTRAVENOUS; SUBCUTANEOUS at 15:57

## 2025-01-30 RX ADMIN — ACETAMINOPHEN 975 MG: 325 TABLET ORAL at 11:33

## 2025-01-30 RX ADMIN — Medication 30 MG: at 14:06

## 2025-01-30 RX ADMIN — Medication 100 MCG: at 14:10

## 2025-01-30 RX ADMIN — FENTANYL CITRATE 25 MCG: 50 INJECTION, SOLUTION INTRAMUSCULAR; INTRAVENOUS at 13:22

## 2025-01-30 RX ADMIN — SUGAMMADEX 200 MG: 100 INJECTION, SOLUTION INTRAVENOUS at 15:25

## 2025-01-30 RX ADMIN — LOSARTAN POTASSIUM 50 MG: 50 TABLET, FILM COATED ORAL at 18:15

## 2025-01-30 RX ADMIN — CELECOXIB 400 MG: 200 CAPSULE ORAL at 11:33

## 2025-01-30 RX ADMIN — Medication 200 MCG: at 14:48

## 2025-01-30 RX ADMIN — ESMOLOL HYDROCHLORIDE 40 MG: 10 INJECTION, SOLUTION INTRAVENOUS at 13:49

## 2025-01-30 RX ADMIN — DEXAMETHASONE SODIUM PHOSPHATE 4 MG: 4 INJECTION, SOLUTION INTRAMUSCULAR; INTRAVENOUS at 14:05

## 2025-01-30 RX ADMIN — Medication 6 L/MIN: at 15:35

## 2025-01-30 RX ADMIN — FENTANYL CITRATE 25 MCG: 50 INJECTION, SOLUTION INTRAMUSCULAR; INTRAVENOUS at 13:46

## 2025-01-30 RX ADMIN — Medication 50 MG: at 13:31

## 2025-01-30 RX ADMIN — FAMOTIDINE 40 MG: 20 TABLET, FILM COATED ORAL at 20:26

## 2025-01-30 RX ADMIN — Medication 100 MCG: at 14:22

## 2025-01-30 RX ADMIN — POLYETHYLENE GLYCOL 3350 17 G: 17 POWDER, FOR SOLUTION ORAL at 20:25

## 2025-01-30 RX ADMIN — ESMOLOL HYDROCHLORIDE 30 MG: 10 INJECTION, SOLUTION INTRAVENOUS at 13:38

## 2025-01-30 RX ADMIN — ACETAMINOPHEN 975 MG: 325 TABLET ORAL at 18:15

## 2025-01-30 SDOH — SOCIAL STABILITY: SOCIAL INSECURITY: ARE THERE ANY APPARENT SIGNS OF INJURIES/BEHAVIORS THAT COULD BE RELATED TO ABUSE/NEGLECT?: NO

## 2025-01-30 SDOH — SOCIAL STABILITY: SOCIAL INSECURITY: WITHIN THE LAST YEAR, HAVE YOU BEEN HUMILIATED OR EMOTIONALLY ABUSED IN OTHER WAYS BY YOUR PARTNER OR EX-PARTNER?: NO

## 2025-01-30 SDOH — HEALTH STABILITY: MENTAL HEALTH: HOW OFTEN DO YOU HAVE SIX OR MORE DRINKS ON ONE OCCASION?: NEVER

## 2025-01-30 SDOH — SOCIAL STABILITY: SOCIAL INSECURITY: HAS ANYONE EVER THREATENED TO HURT YOUR FAMILY OR YOUR PETS?: NO

## 2025-01-30 SDOH — SOCIAL STABILITY: SOCIAL INSECURITY: HAVE YOU HAD ANY THOUGHTS OF HARMING ANYONE ELSE?: NO

## 2025-01-30 SDOH — SOCIAL STABILITY: SOCIAL INSECURITY
WITHIN THE LAST YEAR, HAVE YOU BEEN KICKED, HIT, SLAPPED, OR OTHERWISE PHYSICALLY HURT BY YOUR PARTNER OR EX-PARTNER?: NO

## 2025-01-30 SDOH — SOCIAL STABILITY: SOCIAL INSECURITY: DOES ANYONE TRY TO KEEP YOU FROM HAVING/CONTACTING OTHER FRIENDS OR DOING THINGS OUTSIDE YOUR HOME?: NO

## 2025-01-30 SDOH — ECONOMIC STABILITY: FOOD INSECURITY: WITHIN THE PAST 12 MONTHS, YOU WORRIED THAT YOUR FOOD WOULD RUN OUT BEFORE YOU GOT THE MONEY TO BUY MORE.: NEVER TRUE

## 2025-01-30 SDOH — SOCIAL STABILITY: SOCIAL INSECURITY
WITHIN THE LAST YEAR, HAVE YOU BEEN RAPED OR FORCED TO HAVE ANY KIND OF SEXUAL ACTIVITY BY YOUR PARTNER OR EX-PARTNER?: NO

## 2025-01-30 SDOH — ECONOMIC STABILITY: FOOD INSECURITY: WITHIN THE PAST 12 MONTHS, THE FOOD YOU BOUGHT JUST DIDN'T LAST AND YOU DIDN'T HAVE MONEY TO GET MORE.: NEVER TRUE

## 2025-01-30 SDOH — SOCIAL STABILITY: SOCIAL INSECURITY: WITHIN THE LAST YEAR, HAVE YOU BEEN AFRAID OF YOUR PARTNER OR EX-PARTNER?: NO

## 2025-01-30 SDOH — HEALTH STABILITY: MENTAL HEALTH: HOW OFTEN DO YOU HAVE A DRINK CONTAINING ALCOHOL?: NEVER

## 2025-01-30 SDOH — HEALTH STABILITY: MENTAL HEALTH: HOW MANY DRINKS CONTAINING ALCOHOL DO YOU HAVE ON A TYPICAL DAY WHEN YOU ARE DRINKING?: PATIENT DOES NOT DRINK

## 2025-01-30 SDOH — HEALTH STABILITY: MENTAL HEALTH: CURRENT SMOKER: 0

## 2025-01-30 SDOH — ECONOMIC STABILITY: INCOME INSECURITY: IN THE PAST 12 MONTHS HAS THE ELECTRIC, GAS, OIL, OR WATER COMPANY THREATENED TO SHUT OFF SERVICES IN YOUR HOME?: NO

## 2025-01-30 SDOH — SOCIAL STABILITY: SOCIAL INSECURITY: DO YOU FEEL UNSAFE GOING BACK TO THE PLACE WHERE YOU ARE LIVING?: NO

## 2025-01-30 SDOH — SOCIAL STABILITY: SOCIAL INSECURITY: HAVE YOU HAD THOUGHTS OF HARMING ANYONE ELSE?: NO

## 2025-01-30 SDOH — SOCIAL STABILITY: SOCIAL INSECURITY: ABUSE: ADULT

## 2025-01-30 SDOH — SOCIAL STABILITY: SOCIAL INSECURITY: WERE YOU ABLE TO COMPLETE ALL THE BEHAVIORAL HEALTH SCREENINGS?: YES

## 2025-01-30 SDOH — SOCIAL STABILITY: SOCIAL INSECURITY: ARE YOU OR HAVE YOU BEEN THREATENED OR ABUSED PHYSICALLY, EMOTIONALLY, OR SEXUALLY BY ANYONE?: NO

## 2025-01-30 SDOH — SOCIAL STABILITY: SOCIAL INSECURITY: DO YOU FEEL ANYONE HAS EXPLOITED OR TAKEN ADVANTAGE OF YOU FINANCIALLY OR OF YOUR PERSONAL PROPERTY?: NO

## 2025-01-30 ASSESSMENT — PATIENT HEALTH QUESTIONNAIRE - PHQ9
1. LITTLE INTEREST OR PLEASURE IN DOING THINGS: NOT AT ALL
2. FEELING DOWN, DEPRESSED OR HOPELESS: NOT AT ALL
SUM OF ALL RESPONSES TO PHQ9 QUESTIONS 1 & 2: 0

## 2025-01-30 ASSESSMENT — PAIN SCALES - GENERAL
PAINLEVEL_OUTOF10: 0 - NO PAIN
PAINLEVEL_OUTOF10: 4
PAINLEVEL_OUTOF10: 7
PAINLEVEL_OUTOF10: 0 - NO PAIN
PAINLEVEL_OUTOF10: 0 - NO PAIN
PAINLEVEL_OUTOF10: 6
PAINLEVEL_OUTOF10: 0 - NO PAIN
PAINLEVEL_OUTOF10: 2
PAINLEVEL_OUTOF10: 6
PAIN_LEVEL: 0
PAINLEVEL_OUTOF10: 3

## 2025-01-30 ASSESSMENT — COLUMBIA-SUICIDE SEVERITY RATING SCALE - C-SSRS
1. IN THE PAST MONTH, HAVE YOU WISHED YOU WERE DEAD OR WISHED YOU COULD GO TO SLEEP AND NOT WAKE UP?: NO
6. HAVE YOU EVER DONE ANYTHING, STARTED TO DO ANYTHING, OR PREPARED TO DO ANYTHING TO END YOUR LIFE?: NO
2. HAVE YOU ACTUALLY HAD ANY THOUGHTS OF KILLING YOURSELF?: NO

## 2025-01-30 ASSESSMENT — ACTIVITIES OF DAILY LIVING (ADL)
ASSISTIVE_DEVICE: WALKER
FEEDING YOURSELF: INDEPENDENT
HEARING - LEFT EAR: FUNCTIONAL
DRESSING YOURSELF: INDEPENDENT
HEARING - RIGHT EAR: FUNCTIONAL
ADEQUATE_TO_COMPLETE_ADL: YES
JUDGMENT_ADEQUATE_SAFELY_COMPLETE_DAILY_ACTIVITIES: YES
WALKS IN HOME: NEEDS ASSISTANCE
GROOMING: INDEPENDENT
PATIENT'S MEMORY ADEQUATE TO SAFELY COMPLETE DAILY ACTIVITIES?: YES
TOILETING: INDEPENDENT
LACK_OF_TRANSPORTATION: NO
BATHING: INDEPENDENT

## 2025-01-30 ASSESSMENT — PAIN - FUNCTIONAL ASSESSMENT
PAIN_FUNCTIONAL_ASSESSMENT: 0-10
PAIN_FUNCTIONAL_ASSESSMENT: UNABLE TO SELF-REPORT
PAIN_FUNCTIONAL_ASSESSMENT: 0-10
PAIN_FUNCTIONAL_ASSESSMENT: UNABLE TO SELF-REPORT
PAIN_FUNCTIONAL_ASSESSMENT: 0-10

## 2025-01-30 ASSESSMENT — COGNITIVE AND FUNCTIONAL STATUS - GENERAL
DRESSING REGULAR LOWER BODY CLOTHING: A LITTLE
STANDING UP FROM CHAIR USING ARMS: A LITTLE
PERSONAL GROOMING: A LITTLE
EATING MEALS: A LITTLE
WALKING IN HOSPITAL ROOM: A LITTLE
DAILY ACTIVITIY SCORE: 18
CLIMB 3 TO 5 STEPS WITH RAILING: A LITTLE
WALKING IN HOSPITAL ROOM: A LITTLE
DAILY ACTIVITIY SCORE: 19
PERSONAL GROOMING: A LITTLE
TOILETING: A LITTLE
TURNING FROM BACK TO SIDE WHILE IN FLAT BAD: A LITTLE
MOBILITY SCORE: 18
DAILY ACTIVITIY SCORE: 18
PERSONAL GROOMING: A LITTLE
TURNING FROM BACK TO SIDE WHILE IN FLAT BAD: A LITTLE
HELP NEEDED FOR BATHING: A LITTLE
PATIENT BASELINE BEDBOUND: NO
MOVING TO AND FROM BED TO CHAIR: A LITTLE
HELP NEEDED FOR BATHING: A LITTLE
MOVING FROM LYING ON BACK TO SITTING ON SIDE OF FLAT BED WITH BEDRAILS: A LITTLE
DRESSING REGULAR UPPER BODY CLOTHING: A LITTLE
DRESSING REGULAR UPPER BODY CLOTHING: A LITTLE
STANDING UP FROM CHAIR USING ARMS: A LITTLE
EATING MEALS: A LITTLE
DRESSING REGULAR LOWER BODY CLOTHING: A LITTLE
DRESSING REGULAR UPPER BODY CLOTHING: A LITTLE
MOVING FROM LYING ON BACK TO SITTING ON SIDE OF FLAT BED WITH BEDRAILS: A LITTLE
TOILETING: A LITTLE
HELP NEEDED FOR BATHING: A LITTLE
MOBILITY SCORE: 18
MOVING TO AND FROM BED TO CHAIR: A LITTLE
CLIMB 3 TO 5 STEPS WITH RAILING: A LITTLE
DRESSING REGULAR LOWER BODY CLOTHING: A LITTLE
TURNING FROM BACK TO SIDE WHILE IN FLAT BAD: A LITTLE
MOVING TO AND FROM BED TO CHAIR: A LITTLE
MOVING FROM LYING ON BACK TO SITTING ON SIDE OF FLAT BED WITH BEDRAILS: A LITTLE
MOBILITY SCORE: 18
STANDING UP FROM CHAIR USING ARMS: A LITTLE
TOILETING: A LITTLE
WALKING IN HOSPITAL ROOM: A LITTLE
CLIMB 3 TO 5 STEPS WITH RAILING: A LITTLE

## 2025-01-30 ASSESSMENT — LIFESTYLE VARIABLES
AUDIT-C TOTAL SCORE: 0
SKIP TO QUESTIONS 9-10: 1

## 2025-01-30 ASSESSMENT — PAIN DESCRIPTION - LOCATION: LOCATION: BACK

## 2025-01-30 ASSESSMENT — PAIN DESCRIPTION - ORIENTATION: ORIENTATION: LOWER

## 2025-01-30 NOTE — OP NOTE
L4-S1 Decompression Operative Note     Date: 2025  OR Location: [facility] OR    Name: Curtis Brown, : 1940, Age: 84 y.o., MRN: [id number], Sex: male    Diagnosis  Pre-op Diagnosis      * Lumbar radiculopathy [M54.16]     * Spinal stenosis of lumbar region, unspecified whether neurogenic claudication present [M48.061] Post-op Diagnosis     * Lumbar radiculopathy [M54.16]     * Spinal stenosis of lumbar region, unspecified whether neurogenic claudication present [M48.061]     Procedures  L4-S1 Decompression  49682 - MD HAYWOOD FACETECTOMY & FORAMOTOMY 1 VRT SGM LUMBAR    MD HAYWOOD FACETECTOMY&FORAMOT 1 VRT SGM EA ADDL SGM [[id number]]  Surgeons      * Zia Church - Primary    Resident/Fellow/Other Assistant:  Surgeons and Role:     * Adalberto Jansen MD - Resident - Assisting    Staff:   Surgical Assistant:   Scrub Person: Maude  Scrub Person: Nabila  Scrub Person: Emmanuel  Circulator: Wendie  Circulator: Nisha    Anesthesia Staff: Anesthesiologist: Niels Correa MD  C-AA: JANET Valero  KIP: Haleigh Edgar    Procedure Summary  Anesthesia: Anesthesia type not filed in the log.  ASA: ASA status not filed in the log.  Estimated Blood Loss: 150mL  Intra-op Medications:   Administrations occurring from 1345 to 1630 on 25:   Medication Name Total Dose   dexAMETHasone (Decadron) injection 4 mg/mL 4 mg   esmolol (Brevibloc) injection 40 mg   fentaNYL (Sublimaze) injection 50 mcg/mL 75 mcg   phenylephrine (Erik-Synephrine) 10 mg/250 mL NS (40 mcg/mL) infusion 2.28 mg   phenylephrine 100 mcg/mL syringe 10 mL (prefilled) 400 mcg   rocuronium (Zemuron) 50 mg/5 mL prefilled syringe 30 mg              Anesthesia Record               Intraprocedure I/O Totals          Intake    Phenylephrine Drip 0.00 mL    The total shown is the total volume documented since Anesthesia Start was filed.    Total Intake 0 mL          Specimen: No specimens collected              Drains and/or Catheters: * None in log  *    Implants:         Informed Consent:  The risks, benefits, complications, and alternatives were discussed with the patient. I have explained the surgical procedure in detail with expected duration and extent of recovery along risks of surgery that include, but is not limited to bleeding, infection, blood vessel injury or damage, loss of sensation, loss of bladder, bowel or sexual function, nerve injury/damage resulting in weakness/paralysis, malunion, nonunion, CSF leak, brachial plexus injury, peripheral vision blindness, failure of implants/fusion, failure to relieve symptoms, recurrent disease, adjacent segment disease, need to reoperate for any reason and general anesthesia reaction such as stroke, coma, heart attack, delirium, confusion, death as well as worsening of preexisted medical conditions.     I clearly emphasized that while the goal of surgery is to decompress the spinal cord so as to ARREST the progression of neurological deficits - preexisting deficits may or may not improve after surgery. We discussed that many patients do clinically improve in functional and neurological outcomes following decompression of the spinal elements in patients but the extent of which is variable and depends on the severity of pain, numbness, tingling, or weakness. With improvement seen of those symptoms in that order. We did discuss the goal of surgery to alleviate pain first and foremost and hope for recovery of all neurologic function with time.     All questions were answered and the patient was amenable to proceed.     INDICATIONS FOR THE PROCEDURE: Curtis Brown is an 84 y.o. male who is having surgery for Lumbar radiculopathy [M54.16]  Spinal stenosis of lumbar region, unspecified whether neurogenic claudication present [M48.061].     DESCRIPTION OF THE OPERATION:  The patient was brought to the operating room theater. A verbal huddle was performed confirming the patient by name, date of birth, medical  record number, site of surgery. After all team members were in agreement, they underwent anesthesia induction without complication. Two large bore IVs were placed as well as endotracheal tube. Perioperative antibiotic administration was confirmed. The patient was flipped prone onto a meli table with a lilliana frame and their back was prepped and draped in a sterile fashion. Using lateral fluoroscopy we confirmed our levels of interest L4-S1 with a spinal needle and an incision was marked out in the midline.     The skin was then infiltrated with local anesthetic and we then began the procedure by opening the skin with a 10 blade and using combination of Bovie electrocautery and blunt dissection we exposed the spinous process, pars, lamina, and joints. We then used lateral fluoroscopy again to confirm our levels of interest after total exposure and a self-retaining retractor was placed into the incision.     Next we turned our attention to the decompression. To begin a leksell rongeur and a high speed drill was used to remove the spinous process and lamina exposing the underlying ligamentum flavum. We also performed a medial facetectomies with a high speed drill. After removal of the bone we used kerrison rongeurs, curettes, and blunt dissection to free the traversing nerve root in the lateral recess and performed a foraminotomies as well.     After removal of all of the soft tissue and bone we confirmed adequate decompression lateral recess and foraminal decompression with a marilou and then achieved hemostasis with a bipolar and floseal. A 10 fr round drain was tunneled in a subfascial fashion.     Next we copiously irrigated the incision and began our closure, the muscle and fascia were approximated with 0 vicryl and 2-0 vicryl sutures and the skin was approximated with dermabond. The patient returned to anesthesias care and was extubated and returned to the PACU in stable condition.       Disposition: PACU -  hemodynamically stable.    Condition: stable    Attending Attestation: I was present and scrubbed for the key portions of the procedure.      Zia Church MD, [facility]  Spine , [facility]  Micheal Theodore and Wen Theodore Chair in Spinal Neurosurgery  Neurosurgery , [facility] and [facility]  Complex Spine Surgery Fellowship Director   of Neurological Surgery  [employer]  Office: [phone]  Fax: [phone]

## 2025-01-30 NOTE — ANESTHESIA PREPROCEDURE EVALUATION
Patient: Curtis Brown    Procedure Information       Anesthesia Start Date/Time: 01/30/25 1306    Procedure: L4-S1 Decompression - [id number]    [facility]    Surgeons: Zia Church MD            Relevant Problems   Cardiac   (+) AS (aortic stenosis)   (+) Athscl heart disease of native coronary artery w/o ang pctrs   (+) Hypertension   (+) Hypertriglyceridemia   (+) PVD (peripheral vascular disease) (CMS-HCC)   (+) Presence of stent in coronary artery   (+) Pure hypercholesterolemia   (+) Right bundle branch block (RBBB)      Neuro   (+) Anxiety disorder   (+) Lumbar radiculopathy   (+) Lumbar radiculopathy, chronic   (+) Lumbosacral plexus lesion      GI   (+) Acid reflux   (+) Gastroesophageal reflux disease without esophagitis      /Renal   (+) Acute pyelonephritis   (+) Benign prostatic hyperplasia with urinary frequency      Endocrine   (+) Hypothyroidism   (+) Type 2 diabetes mellitus      Hematology   (+) Achylic anemia   (+) Anemia      Musculoskeletal   (+) Osteoarthritis of hip   (+) Primary osteoarthritis involving multiple joints   (+) Spinal stenosis      HEENT   (+) Congestion of paranasal sinus      ID   (+) Acute pyelonephritis   (+) Lyme disease   (+) Onychomycosis       Clinical information reviewed:   Tobacco  Allergies  Meds  Problems  Med Hx  Surg Hx   Fam Hx  Soc   Hx        NPO Detail:  NPO/Void Status  Carbohydrate Drink Given Prior to Surgery? : Y  Date of Last Liquid: 01/30/25  Time of Last Liquid: 1140  Date of Last Solid: 01/29/25  Time of Last Solid: 2100  Last Intake Type: Clear fluids  Time of Last Void: 1030         Physical Exam    Airway  Mallampati: II  TM distance: >3 FB  Neck ROM: full     Cardiovascular   Rhythm: regular  Rate: normal  (+) murmur  Comments: III-IV/VI HOLOSYSTOLIC MURMUR BEST HEARD ALONG RSB   Dental    Pulmonary   Breath sounds clear to auscultation  (+) decreased breath sounds     Abdominal   (+) obese  Abdomen:  soft  Bowel sounds: normal           Anesthesia Plan    History of general anesthesia?: yes  History of complications of general anesthesia?: no    ASA 4     general   (Pre-Induction Arterial Line insertion (invasive hemodynamic monitoring.)  The patient is not a current smoker.  Patient was previously instructed to abstain from smoking on day of procedure.  Patient did not smoke on day of procedure.  Education provided regarding risk of obstructive sleep apnea.  intravenous induction   Postoperative administration of opioids is intended.  Anesthetic plan and risks discussed with patient.  Use of blood products discussed with patient who consented to blood products.    Plan discussed with CAA and CRNA.

## 2025-01-30 NOTE — DISCHARGE INSTR - OTHER ORDERS
If you have any questions or concerns about your discharge instructions, please call the unit at  and ask to speak with the charge nurse. [place] [facility] [place] to care [place] you.

## 2025-01-30 NOTE — ANESTHESIA PROCEDURE NOTES
Airway  Date/Time: 1/30/2025 1:37 PM  Urgency: elective    Airway not difficult    Staffing  Performed: JANET   Authorized by: Niels Correa MD    Performed by: JANET Valero  Patient location during procedure: OR    Indications and Patient Condition  Indications for airway management: anesthesia  Spontaneous Ventilation: absent  Sedation level: deep  Preoxygenated: yes  Patient position: sniffing  Mask difficulty assessment: 1 - vent by mask  Planned trial extubation    Final Airway Details  Final airway type: endotracheal airway      Successful airway: ETT  Cuffed: yes   Successful intubation technique: direct laryngoscopy  Facilitating devices/methods: intubating stylet  Blade: Rafael  Blade size: #4  ETT size (mm): 7.5  Cormack-Lehane Classification: grade IIb - view of arytenoids or posterior of glottis only  Placement verified by: chest auscultation and capnometry   Measured from: lips  ETT to lips (cm): 23  Number of attempts at approach: 1

## 2025-01-30 NOTE — HOSPITAL COURSE
84M with PMH of HTN, HLD, dCHF, CAD s/p stent on ASA, aortic stenosis, hypothyroidsm, DM, p/w neurogenic claudication, 1/30 L4-S1 lami

## 2025-01-30 NOTE — ANESTHESIA POSTPROCEDURE EVALUATION
Patient: Curtis Brown    Procedure Summary       Date: 01/30/25 Room / Location: [facility] OR 07 / [facility]    Anesthesia Start: 1306 Anesthesia Stop: 1540    Procedure: L4-S1 Decompression Diagnosis:       Lumbar radiculopathy      Spinal stenosis of lumbar region, unspecified whether neurogenic claudication present      (Lumbar radiculopathy [M54.16])      (Spinal stenosis of lumbar region, unspecified whether neurogenic claudication present [M48.061])    Surgeons: Zia Church MD Responsible Provider: Niels Correa MD    Anesthesia Type: general ASA Status: 4            Anesthesia Type: general    Vitals Value Taken Time   /70 01/30/25 1715   Temp 36.2 °C (97.2 °F) 01/30/25 1700   Pulse 75 01/30/25 1719   Resp 16 01/30/25 1715   SpO2 95 % 01/30/25 1719   Vitals shown include unfiled device data.    Anesthesia Post Evaluation    Patient location during evaluation: PACU  Patient participation: complete - patient participated  Level of consciousness: sleepy but conscious, responsive to verbal stimuli, responsive to light touch and responsive to physical stimuli  Pain score: 0  Pain management: satisfactory to patient  Airway patency: patent  Two or more strategies used to mitigate risk of obstructive sleep apnea  Cardiovascular status: acceptable, hemodynamically stable and stable  Respiratory status: acceptable, unassisted, spontaneous ventilation and nonlabored ventilation  Hydration status: acceptable  Postoperative Nausea and Vomiting: none        No notable events documented.

## 2025-01-30 NOTE — DISCHARGE INSTR - ACTIVITY
Call Dr. Church for any problems and/or concerns.  *Maintain spine precautions  *Log roll as instructed  *Walk as much as possible  *Avoid pushing, pulling, bending, twisting, and sitting/laying down in the same position for long periods of time  *No baths, hot tubs, or pools until cleared by physician; no lotions, creams, ointments over incision  *You may shower, do not soak or scrub incision; pat dry  *Continue the coughing and deep breathing exercises that you learned in the hospital  *Do not engage in sports, heavy work or lifting  *If you have a brace/collar-wear as instructed by physician and/or therapy, keep the brace/collar clean and dry, check the skin around the brace/collar every day and notify your physician of any concerns    Call Doctor right away for:  *Fever above 100.4/shaking chills  *New pain, weakness, warmth, or numbness in your legs  *Foot, ankle, or calf swelling that is not relieved by elevating your feet  *Inability to urinate/move bowels  *A severe headache  *Chest pain/shortness of breath-call 911  *Difficulty swallowing (cervical surgery)    If your doctor has ordered compression stockings, wear as directed as they help to prevent blood clots and reduce swelling in your legs    Do not use any products that contain nicotine or tobacco, such as cigarettes, e-cigarettes, and chewing tobacco. These can delay bone healing after surgery. If you need help quitting, ask your healthcare provider    -No heavy lifting or straining until patient is seen in the office.  -Encourage ambulation at least 3 times a day.  -You must be accompanied by a responsible adult upon discharge and for 24 hours after surgery.  Do not drive a motor vehicle, operate machinery, power tools or appliances, drink alcoholic beverages, or make critical decisions for 24 hours and while on narcotic pain meds.  -Be aware of dizziness, which may cause a fall.  Change positions slowly.  -You may resume your regular diet, but do so  slowly.  -Use your pain medication prescription as prescribed by your physician.  If possible, take your medication with food, and drink plenty of fluids.  -Call your surgeon if you have questions, temperature of 100.4° or greater, increased bleeding swelling or pain, drainage from the incision or increased redness around the incision.

## 2025-01-30 NOTE — ANESTHESIA PROCEDURE NOTES
Arterial Line:    Date/Time: 1/30/2025 1:26 PM    Staffing  Performed: attending and KIP   Authorized by: Niels Correa MD    Performed by: JANET Valero    An arterial line was placed. in the OR for the following indication(s): continuous blood pressure monitoring.    A 20 gauge (size) (length) (type) catheter was placed into the Left radial artery, secured by Tegaderm,   Seldinger technique used.  Events:  patient tolerated procedure well with no complications.      Additional notes:  Local infiltration with 1% lidocaine

## 2025-01-31 VITALS
HEART RATE: 68 BPM | BODY MASS INDEX: 28.37 KG/M2 | OXYGEN SATURATION: 96 % | RESPIRATION RATE: 16 BRPM | HEIGHT: 68 IN | SYSTOLIC BLOOD PRESSURE: 106 MMHG | TEMPERATURE: 98.4 F | WEIGHT: 187.17 LBS | DIASTOLIC BLOOD PRESSURE: 56 MMHG

## 2025-01-31 LAB
ANION GAP SERPL CALC-SCNC: 14 MMOL/L (ref 10–20)
BUN SERPL-MCNC: 20 MG/DL (ref 6–23)
CALCIUM SERPL-MCNC: 9.4 MG/DL (ref 8.6–10.3)
CHLORIDE SERPL-SCNC: 101 MMOL/L (ref 98–107)
CO2 SERPL-SCNC: 28 MMOL/L (ref 21–32)
CREAT SERPL-MCNC: 1.11 MG/DL (ref 0.5–1.3)
EGFRCR SERPLBLD CKD-EPI 2021: 66 ML/MIN/1.73M*2
ERYTHROCYTE [DISTWIDTH] IN BLOOD BY AUTOMATED COUNT: 13.4 % (ref 11.5–14.5)
GLUCOSE BLD MANUAL STRIP-MCNC: 132 MG/DL (ref 74–99)
GLUCOSE BLD MANUAL STRIP-MCNC: 164 MG/DL (ref 74–99)
GLUCOSE SERPL-MCNC: 117 MG/DL (ref 74–99)
HCT VFR BLD AUTO: 39.4 % (ref 41–52)
HGB BLD-MCNC: 12.3 G/DL (ref 13.5–17.5)
MCH RBC QN AUTO: 31.2 PG (ref 26–34)
MCHC RBC AUTO-ENTMCNC: 31.2 G/DL (ref 32–36)
MCV RBC AUTO: 100 FL (ref 80–100)
NRBC BLD-RTO: 0 /100 WBCS (ref 0–0)
PLATELET # BLD AUTO: 189 X10*3/UL (ref 150–450)
POTASSIUM SERPL-SCNC: 3.8 MMOL/L (ref 3.5–5.3)
RBC # BLD AUTO: 3.94 X10*6/UL (ref 4.5–5.9)
SODIUM SERPL-SCNC: 139 MMOL/L (ref 136–145)
WBC # BLD AUTO: 13.5 X10*3/UL (ref 4.4–11.3)

## 2025-01-31 PROCEDURE — 7100000011 HC EXTENDED STAY RECOVERY HOURLY - NURSING UNIT

## 2025-01-31 PROCEDURE — 85027 COMPLETE CBC AUTOMATED: CPT | Performed by: STUDENT IN AN ORGANIZED HEALTH CARE EDUCATION/TRAINING PROGRAM

## 2025-01-31 PROCEDURE — 80048 BASIC METABOLIC PNL TOTAL CA: CPT | Performed by: STUDENT IN AN ORGANIZED HEALTH CARE EDUCATION/TRAINING PROGRAM

## 2025-01-31 PROCEDURE — 36415 COLL VENOUS BLD VENIPUNCTURE: CPT | Performed by: STUDENT IN AN ORGANIZED HEALTH CARE EDUCATION/TRAINING PROGRAM

## 2025-01-31 PROCEDURE — 97161 PT EVAL LOW COMPLEX 20 MIN: CPT | Mod: GP

## 2025-01-31 PROCEDURE — 97165 OT EVAL LOW COMPLEX 30 MIN: CPT | Mod: GO | Performed by: OCCUPATIONAL THERAPIST

## 2025-01-31 PROCEDURE — 2500000001 HC RX 250 WO HCPCS SELF ADMINISTERED DRUGS (ALT 637 FOR MEDICARE OP): Performed by: STUDENT IN AN ORGANIZED HEALTH CARE EDUCATION/TRAINING PROGRAM

## 2025-01-31 PROCEDURE — 2500000004 HC RX 250 GENERAL PHARMACY W/ HCPCS (ALT 636 FOR OP/ED): Performed by: STUDENT IN AN ORGANIZED HEALTH CARE EDUCATION/TRAINING PROGRAM

## 2025-01-31 PROCEDURE — 96372 THER/PROPH/DIAG INJ SC/IM: CPT | Performed by: STUDENT IN AN ORGANIZED HEALTH CARE EDUCATION/TRAINING PROGRAM

## 2025-01-31 PROCEDURE — 82947 ASSAY GLUCOSE BLOOD QUANT: CPT

## 2025-01-31 PROCEDURE — 2500000005 HC RX 250 GENERAL PHARMACY W/O HCPCS: Performed by: STUDENT IN AN ORGANIZED HEALTH CARE EDUCATION/TRAINING PROGRAM

## 2025-01-31 RX ORDER — OXYCODONE HYDROCHLORIDE 5 MG/1
5 TABLET ORAL EVERY 6 HOURS PRN
Qty: 28 TABLET | Refills: 0 | Status: SHIPPED | OUTPATIENT
Start: 2025-01-31 | End: 2025-02-04 | Stop reason: SDUPTHER

## 2025-01-31 RX ORDER — NAPROXEN SODIUM 220 MG/1
81 TABLET, FILM COATED ORAL DAILY
Start: 2025-01-31

## 2025-01-31 RX ORDER — CYCLOBENZAPRINE HCL 5 MG
5 TABLET ORAL 3 TIMES DAILY PRN
Qty: 30 TABLET | Refills: 0 | Status: SHIPPED | OUTPATIENT
Start: 2025-01-31 | End: 2025-02-07 | Stop reason: SDUPTHER

## 2025-01-31 RX ADMIN — FINASTERIDE 2.5 MG: 5 TABLET, FILM COATED ORAL at 08:41

## 2025-01-31 RX ADMIN — CYCLOBENZAPRINE HYDROCHLORIDE 5 MG: 5 TABLET, FILM COATED ORAL at 08:41

## 2025-01-31 RX ADMIN — METOPROLOL TARTRATE 25 MG: 25 TABLET, FILM COATED ORAL at 08:42

## 2025-01-31 RX ADMIN — POLYETHYLENE GLYCOL 3350 17 G: 17 POWDER, FOR SOLUTION ORAL at 08:41

## 2025-01-31 RX ADMIN — HEPARIN SODIUM 5000 UNITS: 5000 INJECTION, SOLUTION INTRAVENOUS; SUBCUTANEOUS at 02:19

## 2025-01-31 RX ADMIN — HEPARIN SODIUM 5000 UNITS: 5000 INJECTION, SOLUTION INTRAVENOUS; SUBCUTANEOUS at 09:38

## 2025-01-31 RX ADMIN — FAMOTIDINE 40 MG: 20 TABLET, FILM COATED ORAL at 08:41

## 2025-01-31 RX ADMIN — OXYCODONE HYDROCHLORIDE 5 MG: 5 TABLET ORAL at 00:44

## 2025-01-31 RX ADMIN — AMLODIPINE BESYLATE 10 MG: 10 TABLET ORAL at 08:42

## 2025-01-31 RX ADMIN — LOSARTAN POTASSIUM 50 MG: 50 TABLET, FILM COATED ORAL at 08:42

## 2025-01-31 RX ADMIN — LIDOCAINE 1 PATCH: 4 PATCH TOPICAL at 08:42

## 2025-01-31 RX ADMIN — ACETAMINOPHEN 975 MG: 325 TABLET ORAL at 02:18

## 2025-01-31 RX ADMIN — OXYCODONE HYDROCHLORIDE 5 MG: 5 TABLET ORAL at 09:17

## 2025-01-31 RX ADMIN — ACETAMINOPHEN 975 MG: 325 TABLET ORAL at 09:38

## 2025-01-31 RX ADMIN — HYDROCHLOROTHIAZIDE 25 MG: 25 TABLET ORAL at 08:42

## 2025-01-31 ASSESSMENT — PAIN SCALES - GENERAL
PAINLEVEL_OUTOF10: 5 - MODERATE PAIN
PAINLEVEL_OUTOF10: 5 - MODERATE PAIN
PAINLEVEL_OUTOF10: 6
PAINLEVEL_OUTOF10: 2

## 2025-01-31 ASSESSMENT — COGNITIVE AND FUNCTIONAL STATUS - GENERAL
DRESSING REGULAR LOWER BODY CLOTHING: A LITTLE
CLIMB 3 TO 5 STEPS WITH RAILING: A LITTLE
STANDING UP FROM CHAIR USING ARMS: A LITTLE
DRESSING REGULAR LOWER BODY CLOTHING: A LITTLE
WALKING IN HOSPITAL ROOM: A LITTLE
CLIMB 3 TO 5 STEPS WITH RAILING: A LITTLE
MOBILITY SCORE: 18
PERSONAL GROOMING: A LITTLE
TOILETING: A LITTLE
PERSONAL GROOMING: A LITTLE
STANDING UP FROM CHAIR USING ARMS: A LITTLE
DRESSING REGULAR UPPER BODY CLOTHING: A LITTLE
TURNING FROM BACK TO SIDE WHILE IN FLAT BAD: A LITTLE
MOBILITY SCORE: 18
MOVING FROM LYING ON BACK TO SITTING ON SIDE OF FLAT BED WITH BEDRAILS: A LITTLE
TOILETING: A LITTLE
EATING MEALS: A LITTLE
DAILY ACTIVITIY SCORE: 18
MOVING TO AND FROM BED TO CHAIR: A LITTLE
HELP NEEDED FOR BATHING: A LITTLE
DAILY ACTIVITIY SCORE: 20
MOVING FROM LYING ON BACK TO SITTING ON SIDE OF FLAT BED WITH BEDRAILS: A LITTLE
WALKING IN HOSPITAL ROOM: A LITTLE
HELP NEEDED FOR BATHING: A LITTLE
MOVING TO AND FROM BED TO CHAIR: A LITTLE
TURNING FROM BACK TO SIDE WHILE IN FLAT BAD: A LITTLE

## 2025-01-31 ASSESSMENT — PAIN - FUNCTIONAL ASSESSMENT
PAIN_FUNCTIONAL_ASSESSMENT: 0-10

## 2025-01-31 ASSESSMENT — PAIN DESCRIPTION - ORIENTATION
ORIENTATION: LOWER
ORIENTATION: LOWER

## 2025-01-31 ASSESSMENT — ACTIVITIES OF DAILY LIVING (ADL): ADL_ASSISTANCE: INDEPENDENT

## 2025-01-31 ASSESSMENT — PAIN DESCRIPTION - LOCATION
LOCATION: BACK
LOCATION: BACK

## 2025-01-31 NOTE — CARE PLAN
The clinical goals for the shift include Pt will demonstrate comfort aeb sleeping in intervals of 3 hours or greater by end of shift.    Over the shift, the patient did make progress toward the following goal of demonstrating comfort aeb sleeping in intervals of 3 hours or greater by end of shift.

## 2025-01-31 NOTE — DISCHARGE SUMMARY
Discharge Diagnosis  S/P L4-S1 decompression    Issues Requiring Follow-Up  None, F/U in place    Test Results Pending At Discharge  Pending Labs       No current pending labs.            Hospital Course  Presented for elective lumbar decompression. Recovered well in PACU. Admitted to the floor post-operatively. No complications post-operatively. Reports pain well controlled. Ambulating and voiding without difficulty. Surgical drain removed. Meets criteria for discharge.     Pertinent Physical Exam At Time of Discharge  General: Well developed, awake/alert/oriented x3, no distress, alert and cooperative  Skin: Warm and dry  ENMT: Mucous membranes moist  Head/Neck: Neck Supple  Respiratory/Thorax: Normal breath sounds with good chest expansion  Cardiovascular: No pitting edema    Motor Strength: 5/5 Throughout all extremities    Sensation: intact to light touch    Incision well approximated with exofin skin glue. No erythema/edema or drainage.     Home Medications     Medication List      START taking these medications     cyclobenzaprine 5 mg tablet; Commonly known as: Flexeril; Take 1 tablet   (5 mg) by mouth 3 times a day as needed for muscle spasms for up to 15   days.   oxyCODONE 5 mg immediate release tablet; Commonly known as: Roxicodone;   Take 1 tablet (5 mg) by mouth every 6 hours if needed for moderate pain (4   - 6) or severe pain (7 - 10) for up to 7 days.     CHANGE how you take these medications     aspirin 81 mg chewable tablet; Chew 1 tablet (81 mg) once daily. Ok to   restart 2/7/25; What changed: additional instructions     CONTINUE taking these medications     Accu-Chek Pippa Plus test strp strip; Generic drug: blood sugar   diagnostic   Accu-Chek Fastclix Lancet Drum misc; Generic drug: lancets   amLODIPine 10 mg tablet; Commonly known as: Norvasc; TAKE 1 TABLET BY   MOUTH ONCE  DAILY   coenzyme Q-10 300 mg capsule capsule   famotidine 40 mg tablet; Commonly known as: Pepcid; TAKE 1 TABLET BY    MOUTH TWICE  DAILY   fenofibrate 48 mg tablet; Commonly known as: Tricor; Take 1 tablet (48   mg) by mouth once daily.   finasteride 5 mg tablet; Commonly known as: Proscar; Take 1 tablet (5   mg) by mouth once daily.   * glimepiride 1 mg tablet; Commonly known as: Amaryl   * glimepiride 2 mg tablet; Commonly known as: Amaryl   HUMALOG KWIKPEN INSULIN SUBQ   hydroCHLOROthiazide 25 mg tablet; Commonly known as: HYDRODiuril; Take 1   tablet (25 mg) by mouth once daily.   lactobacillus acidophilus capsule   * levothyroxine 50 mcg tablet; Commonly known as: Synthroid, Levoxyl   * levothyroxine 50 mcg tablet; Commonly known as: Synthroid, Levoxyl   Livalo 4 mg tablet; Generic drug: pitavastatin calcium   losartan 50 mg tablet; Commonly known as: Cozaar   metoprolol tartrate 25 mg tablet; Commonly known as: Lopressor; TAKE 1   TABLET BY MOUTH TWICE  DAILY   multivitamin tablet   nitroglycerin 0.4 mg SL tablet; Commonly known as: Nitrostat; Place 1   tablet (0.4 mg) under the tongue every 5 minutes if needed for chest pain.   Under the tongue as needed for chest pain   omega-3s-dha-epa-fish oil 720-1,200 mg capsule,delayed release(DR/EC)   potassium chloride CR 20 mEq ER tablet; Commonly known as: Klor-Con M20   PRESERVISION AREDS-2 ORAL   psyllium powder; Commonly known as: Metamucil   SAMBUCUS ELDERBERRY ORAL   VITAMIN B12-FOLIC ACID SL  * This list has 4 medication(s) that are the same as other medications   prescribed for you. Read the directions carefully, and ask your doctor or   other care provider to review them with you.     STOP taking these medications     chlorhexidine 0.12 % solution; Commonly known as: Peridex   clindamycin 300 mg capsule; Commonly known as: Cleocin   diphenoxylate-atropine 2.5-0.025 mg tablet; Commonly known as: Lomotil   gabapentin 300 mg capsule; Commonly known as: Neurontin       Outpatient Follow-Up  Future Appointments   Date Time Provider Department Center   2/18/2025 10:30 AM Angella  GIANFRANCO Lu-CNP [id number] None   4/18/2025 11:20 AM Zia Church MD [id number] [place]   5/7/2025  1:00 PM PAR MAC 3 ECHO ROOM 1 [id number] MAC 3300   5/7/2025  2:00 PM Anil Lindsey MD [id number] [place]             Angella Brown APRN-[place] [address] [address] [place] [address]  [address] [place] [address]     Phone: [phone]  Fax: [phone]

## 2025-01-31 NOTE — PROGRESS NOTES
01/31/25 1044   Discharge Planning   Living Arrangements Spouse/significant other   Support Systems Spouse/significant other   Type of Residence Private residence   Home or Post Acute Services Post acute facilities (Rehab/SNF/etc);None   Expected Discharge Disposition Home   Does the patient need discharge transport arranged? Yes     Met with patient at bedside. Admitted for lumbar decompression. Pt lives with spouse and was independent PTA with no HHC. Pt has a walker and lives in one level home. PCP Is Baudilio Gonzalez. Pt feels he is able to manage his health and understands his medications. Was able to drive and obtain meds. Therapy evals pending. Pt plans to return home with no new discharge needs. Family will provide transport home.

## 2025-01-31 NOTE — PROGRESS NOTES
Physical Therapy    Physical Therapy Evaluation    Patient Name: Curtis Brown  MRN: [id number]  Today's Date: 1/31/2025   Time Calculation  Start Time: 1108  Stop Time: 1132  Time Calculation (min): 24 min  4104/4104-A    Assessment/Plan   PT Assessment  PT Assessment Results: Decreased strength, Decreased range of motion, Decreased endurance, Impaired balance, Decreased mobility, Decreased safety awareness, Pain, Orthopedic restrictions  Rehab Prognosis: Good  Evaluation/Treatment Tolerance: Patient tolerated treatment well  Medical Staff Made Aware: Yes  End of Session Communication: Bedside nurse  Assessment Comment: Pt presents today below baseline level of function and requires continued PT during hospital stay. Pt requires PT at a low intensity level at discharge to maximize functional mobility and safety. Recommend pt use a FWW at all times in order to maximize safety and stability.     End of Session Patient Position: Up in chair, Alarm on  IP OR SWING BED PT PLAN  Inpatient or Swing Bed: Inpatient  PT Plan  Treatment/Interventions: Bed mobility, Transfer training, Gait training, Balance training, Neuromuscular re-education, Strengthening, Endurance training, Range of motion, Therapeutic exercise, Therapeutic activity, Home exercise program, Stair training  PT Plan: Ongoing PT  PT Frequency: Daily  PT Discharge Recommendations: Low intensity level of continued care  Equipment Recommended upon Discharge: Wheeled walker  PT Recommended Transfer Status: Assist x1  PT - OK to Discharge: Yes (To next level of care when cleared by medical team   )    Subjective       General Visit Information:  General  Reason for Referral: recent spine surgery  Referred By: Dr. Church  Past Medical History Relevant to Rehab: Admitted 1/30/24 following completion of L4-S1 Decompression. PMH: malignant neoplasm, spinal stenosis, HTN, HLD, DM, left JOSIAS  Family/Caregiver Present: No  Co-Treatment: OT  Co-Treatment Reason: for  discharge planning  Prior to Session Communication: Bedside nurse  Patient Position Received: Bed, 3 rail up, Alarm on  Preferred Learning Style: verbal  General Comment: Pt agreeable to PT, nursing cleared for treatment.    Home Living:  Home Living  Type of Home: House  Lives With: Spouse  Home Adaptive Equipment: Walker rolling or standard  Home Layout: One level, Stairs to alternate level with rails  Alternate Level Stairs-Number of Steps: 12 stairs down to basement  Home Access: Stairs to enter with rails  Entrance Stairs-Number of Steps: 3  Bathroom Shower/Tub: Walk-in shower (in basement)  Bathroom Equipment: Grab bars in shower, Shower chair with back    Prior Level of Function:  Prior Function Per Pt/Caregiver Report  ADL Assistance: Independent  Homemaking Assistance: Independent  Ambulatory Assistance: Independent    Precautions:  Precautions  Medical Precautions: Fall precautions  Post-Surgical Precautions: Spinal precautions       Objective     Pain:  Pain Assessment  Pain Assessment: 0-10  0-10 (Numeric) Pain Score:  (reports moderate back and left buttocks pain but did not provide numerical value)  Pain Type: Surgical pain  Pain Interventions: Ambulation/increased activity, Repositioned    Cognition:  Cognition  Overall Cognitive Status: Within Functional Limits (tangential and needs frequent redirection to stay on task)  Impulsive: Moderately    General Assessments:      Activity Tolerance  Endurance: Endurance does not limit participation in activity  Sensation  Sensation Comment: denies numbness and tingling              Static Sitting Balance  Static Sitting-Comment/Number of Minutes: good  Dynamic Sitting Balance  Dynamic Sitting-Comments: good  Static Standing Balance  Static Standing-Comment/Number of Minutes: fair  Dynamic Standing Balance  Dynamic Standing-Comments: fair    Functional Assessments:     Bed Mobility  Bed Mobility: Yes  Bed Mobility 1  Bed Mobility 1: Supine to sitting  Level of  Assistance 1: Close supervision  Bed Mobility Comments 1: using log roll  Transfers  Transfer: Yes  Transfer 1  Transfer From 1: Sit to  Transfer to 1: Stand  Transfer Device 1: Walker  Transfer Level of Assistance 1: Close supervision  Transfers 2  Transfer From 2: Stand to  Transfer to 2: Sit  Transfer Device 2: Walker  Transfer Level of Assistance 2: Close supervision  Ambulation/Gait Training  Ambulation/Gait Training Performed: Yes  Ambulation/Gait Training 1  Device 1: Rolling walker  Assistance 1: Close supervision  Quality of Gait 1: Decreased step length (mildly forward flexed posture)  Comments/Distance (ft) 1: 300 feet  Stairs  Stairs: Yes  Stairs  Rails 1: Left  Device 1: Railing  Assistance 1: Contact guard  Comment/Number of Steps 1: 3 stairs using step to step pattern       Extremity/Trunk Assessments:        RLE   RLE : Within Functional Limits (with functional movement assessment)  LLE   LLE : Within Functional Limits (with functional movement assessment)    Outcome Measures:     [facility] Basic Mobility  Turning from your back to your side while in a flat bed without using bedrails: A little  Moving from lying on your back to sitting on the side of a flat bed without using bedrails: A little  Moving to and from bed to chair (including a wheelchair): A little  Standing up from a chair using your arms (e.g. wheelchair or bedside chair): A little  To walk in hospital room: A little  Climbing 3-5 steps with railing: A little  Basic Mobility - Total Score: 18                      Goals:  Encounter Problems       Encounter Problems (Active)       PT Problem       Pt will perform bed mobility with mod I.  (Progressing)       Start:  01/31/25    Expected End:  02/14/25            Pt will complete sit <> stand and bed <> chair transfers with mod I.  (Progressing)       Start:  01/31/25    Expected End:  02/14/25            Pt will ambulate 500 feet mod I using FWW with no significant gait deviations.    (Progressing)       Start:  01/31/25    Expected End:  02/14/25            Pt will ascend/descend at least 12 stairs using rail SBA in order to navigate home environment.   (Progressing)       Start:  01/31/25    Expected End:  02/14/25            Pt will progress to completing 3 x 20 supine/seated/standing exercises in order to increase strength and improve gait mechanics.   (Not Progressing)       Start:  01/31/25    Expected End:  02/14/25                 Education Documentation  Precautions, taught by Meryl Ching, PT at 1/31/2025  3:18 PM.  Learner: Patient  Readiness: Acceptance  Method: Explanation  Response: Verbalizes Understanding    Body Mechanics, taught by Meryl Ching, PT at 1/31/2025  3:18 PM.  Learner: Patient  Readiness: Acceptance  Method: Explanation  Response: Verbalizes Understanding    Mobility Training, taught by Meryl Ching, PT at 1/31/2025  3:18 PM.  Learner: Patient  Readiness: Acceptance  Method: Explanation  Response: Verbalizes Understanding    Education Comments  No comments found.

## 2025-01-31 NOTE — PROGRESS NOTES
Occupational Therapy    Evaluation    Patient Name: Curtis Brown  MRN: [id number]  Today's Date: 1/31/2025  Time Calculation  Start Time: 1109  Stop Time: 1131  Time Calculation (min): 22 min  4104/4104-A  Eval only     Assessment  IP OT Assessment  Prognosis: Good  Medical Staff Made Aware: Yes  End of Session Communication: Bedside nurse  End of Session Patient Position: Up in chair, Alarm on  Patient presents with decline in ADLs, functional transfers, functional mobility and would benefit from OT during acute stay to improve functional independence and safety. Recommend low intensity OT to maximize functional independence and safety.     Plan:  Treatment Interventions: ADL retraining, Functional transfer training, Patient/family training, Equipment evaluation/education, Compensatory technique education  OT Frequency: Daily  OT Discharge Recommendations: Low intensity level of continued care  Equipment Recommended upon Discharge: Wheeled walker (shower chair, reacher)  OT - OK to Discharge: Yes from acute care OT services to the next level of care when cleared by medical team      Subjective   Current Problem:  1. Lumbar radiculopathy  Insert and maintain peripheral IV    Saline lock IV    povidone-iodine 5 % kit kit    celecoxib (CeleBREX) capsule 400 mg    acetaminophen (Tylenol) tablet 975 mg    tranexamic acid (Lysteda) tablet 1,300 mg    Place in outpatient/hospital ambulatory surgery    Insert and maintain peripheral IV    Saline lock IV    Place in outpatient/hospital ambulatory surgery    Inpatient consult to Respiratory Care    Inpatient consult to Respiratory Care    CANCELED: Full code    CANCELED: NPO Diet Except: Sips with meds; Effective now    CANCELED: Height and weight    CANCELED: POCT Glucose    CANCELED: Apply sequential compression device    CANCELED: Apply OMAR hose    CANCELED: Full code    CANCELED: NPO Diet Except: Sips with meds; Effective now    CANCELED: Height and weight    CANCELED:  POCT Glucose    CANCELED: Apply sequential compression device    CANCELED: Apply OMAR hose      2. Neurogenic claudication        3. Spinal stenosis of lumbar region, unspecified whether neurogenic claudication present  Insert and maintain peripheral IV    Saline lock IV    povidone-iodine 5 % kit kit    celecoxib (CeleBREX) capsule 400 mg    acetaminophen (Tylenol) tablet 975 mg    tranexamic acid (Lysteda) tablet 1,300 mg    Place in outpatient/hospital ambulatory surgery    Insert and maintain peripheral IV    Saline lock IV    Place in outpatient/hospital ambulatory surgery    Inpatient consult to Respiratory Care    Inpatient consult to Respiratory Care    CANCELED: Full code    CANCELED: NPO Diet Except: Sips with meds; Effective now    CANCELED: Height and weight    CANCELED: POCT Glucose    CANCELED: Apply sequential compression device    CANCELED: Apply OMAR hose    CANCELED: Full code    CANCELED: NPO Diet Except: Sips with meds; Effective now    CANCELED: Height and weight    CANCELED: POCT Glucose    CANCELED: Apply sequential compression device    CANCELED: Apply OMAR hose      4. PVD (peripheral vascular disease) (CMS-HCC)  aspirin 81 mg chewable tablet      5. Post-operative pain  oxyCODONE (Roxicodone) 5 mg immediate release tablet    cyclobenzaprine (Flexeril) 5 mg tablet          General:  General  Reason for Referral: recent spine surgery  Referred By: Dr Church  Past Medical History Relevant to Rehab: Admitted 1/30/2025 after having the following completed by Dr Church: L4-S1 Decompression  PMH: malignant neoplasm, low back pain, spinal stenosis, HTN, HLD, DM II, left JOSIAS  Co-Treatment: PT  Co-Treatment Reason: for discharge planning  Prior to Session Communication: Bedside nurse  Patient Position Received: Bed, 3 rail up, Alarm on  Preferred Learning Style: verbal  General Comment: Patient in long legged sitting in bed and agreeable to participate in OT evaluation.    Precautions:  Medical  "Precautions: Fall precautions  Post-Surgical Precautions: Spinal precautions    Pain:  Pain Assessment  Pain Assessment: 0-10  0-10 (Numeric) Pain Score:  (reported moderate back pain but did not provide numerical value)  Pain Interventions: Rest    Objective   Cognition:  Overall Cognitive Status: Within Functional Limits (tangential)   Impulsive: Moderately    Home Living:  Home Living Comments: Lives at home with spouse with 2 OMAR with railing. Has 12 steps downto basement where there is a WIS. Has tub shower on first floor.     Prior Function:  Prior Function Comments: Was independent with all ADLs. Spouse is home recovering from recent hip dislocation.      ADL:  LE Dressing Assistance: Stand by (don pants)  ADL Comments: Educated patient on safety and comp strategies for lower body dressing and patient verbalized understanding and stated \"I know how to do that\". Educated patient on use of reacher and patient reported owning one at home.     Activity Tolerance:  Endurance: Endurance does not limit participation in activity    Bed Mobility/Transfers:   Bed Mobility  Bed Mobility:  (SBA supine to sit with log roll technique)  Transfers  Transfer:  (SBA sit <>stand with min verbal cues for safety and technique. Patient reported having used the walker to lower self on toilet; however, educated on safety with sit to stand and to avoid doing this as the walker can tip. Patient reported \"I know\")    Ambulation/Gait Training:  Functional Mobility  Functional Mobility Performed:  (SBA with WW functional mobility task)    Extremities: RUE   RUE : Within Functional Limits and LUE   LUE: Within Functional Limits    Outcome Measures: [facility] Daily Activity  Putting on and taking off regular lower body clothing: A little  Bathing (including washing, rinsing, drying): A little  Putting on and taking off regular upper body clothing: None  Toileting, which includes using toilet, bedpan or urinal: A little  Taking care of personal " grooming such as brushing teeth: A little  Eating Meals: None  Daily Activity - Total Score: 20    EDUCATION:  Education  Individual(s) Educated: Patient  Education Provided: Fall precautons (spine precautions, safety with LB dressing, safety with transfers)  Patient Response to Education: Patient/Caregiver Verbalized Understanding of Information    Goals:   Encounter Problems       Encounter Problems (Active)       Dressings Lower Extremities       STG - Patient will complete lower body dressing independently with AE and comp strategies  (Progressing)       Start:  01/31/25    Expected End:  02/14/25               Functional Balance       STG-Patient will be independent with assistive device dynamic stand task >5 minutes for ADL completion   (Progressing)       Start:  01/31/25    Expected End:  02/14/25               Functional Mobility       STG-Patient will be independent with assistive device functional mobility tasks   (Progressing)       Start:  01/31/25    Expected End:  02/14/25               OT Transfers       STG - Patient will perform car transfers independently demonstrating good safety  (Progressing)       Start:  01/31/25    Expected End:  02/14/25            STG-Patient will be independent with functional transfers demonstrating good safety   (Progressing)       Start:  01/31/25    Expected End:  02/14/25               Safety       STG-Patient will independently verbalize spine precautions with all functional tasks   (Progressing)       Start:  01/31/25    Expected End:  02/14/25

## 2025-01-31 NOTE — CARE PLAN
The patient's goals for the shift include      The clinical goals for the shift include pain control  Pt discharged to home. H/l removed. Pain controlled.

## 2025-01-31 NOTE — NURSING NOTE
Message sent to Socorro Saldaña: FYI: pt  at 2030. Pt takes glimepiride 5 mg at home in evening also has insulin sliding scale. please advise

## 2025-02-01 ENCOUNTER — TELEPHONE (OUTPATIENT)
Dept: NEUROSURGERY | Facility: HOSPITAL | Age: 85
End: 2025-02-01
Payer: MEDICARE

## 2025-02-01 DIAGNOSIS — T40.2X5A CONSTIPATION DUE TO OPIOID THERAPY: Primary | ICD-10-CM

## 2025-02-01 DIAGNOSIS — K59.03 CONSTIPATION DUE TO OPIOID THERAPY: Primary | ICD-10-CM

## 2025-02-01 RX ORDER — BISACODYL 5 MG
5 TABLET, DELAYED RELEASE (ENTERIC COATED) ORAL DAILY PRN
Qty: 10 TABLET | Refills: 0 | Status: SHIPPED | OUTPATIENT
Start: 2025-02-01

## 2025-02-04 ENCOUNTER — TELEPHONE (OUTPATIENT)
Dept: PRIMARY CARE | Facility: CLINIC | Age: 85
End: 2025-02-04
Payer: MEDICARE

## 2025-02-04 ENCOUNTER — TELEPHONE (OUTPATIENT)
Dept: NEUROSURGERY | Facility: CLINIC | Age: 85
End: 2025-02-04
Payer: MEDICARE

## 2025-02-04 DIAGNOSIS — G89.18 POST-OPERATIVE PAIN: ICD-10-CM

## 2025-02-04 RX ORDER — OXYCODONE HYDROCHLORIDE 5 MG/1
5 TABLET ORAL EVERY 6 HOURS PRN
Qty: 28 TABLET | Refills: 0 | Status: SHIPPED | OUTPATIENT
Start: 2025-02-07 | End: 2025-02-14

## 2025-02-04 NOTE — TELEPHONE ENCOUNTER
----- Message from Baudilio Gonzalez sent at 1/31/2025  5:13 PM EST -----  Regarding: TRANSITIONS OF CARE  Sharri DISCHARGED on 1/31/25, follow up next week

## 2025-02-04 NOTE — PROGRESS NOTES
Refill sent for Oxycodone to be filled 2/7/25.    I have personally reviewed the OARRS report. This report is scanned into the electronic medical record. I have considered the risks of abuse, dependence, addiction and diversion.     Patient with recent history of major reconstructive surgery necessitating narcotic medications at higher doses during the post-operative period of 6 weeks.     Will continue with close monitoring and short course refills.

## 2025-02-07 ENCOUNTER — TELEPHONE (OUTPATIENT)
Dept: NEUROSURGERY | Facility: CLINIC | Age: 85
End: 2025-02-07
Payer: MEDICARE

## 2025-02-07 DIAGNOSIS — G89.18 POST-OPERATIVE PAIN: ICD-10-CM

## 2025-02-07 RX ORDER — CYCLOBENZAPRINE HCL 5 MG
5 TABLET ORAL 3 TIMES DAILY PRN
Qty: 60 TABLET | Refills: 0 | Status: SHIPPED | OUTPATIENT
Start: 2025-02-07 | End: 2025-03-09

## 2025-02-07 NOTE — PROGRESS NOTES
Refill provided for Sukhdeep MEDEL-[facility]  [address]  [address]  [place] [address]    [address] [place] [address]  [address]  [place] [address]     Phone: [phone]  Fax: [phone]

## 2025-02-12 ENCOUNTER — TELEPHONE (OUTPATIENT)
Dept: NEUROSURGERY | Facility: CLINIC | Age: 85
End: 2025-02-12
Payer: MEDICARE

## 2025-02-12 DIAGNOSIS — G89.18 POST-OPERATIVE PAIN: ICD-10-CM

## 2025-02-12 RX ORDER — OXYCODONE HYDROCHLORIDE 5 MG/1
5 TABLET ORAL EVERY 6 HOURS PRN
Qty: 28 TABLET | Refills: 0 | Status: SHIPPED | OUTPATIENT
Start: 2025-02-14 | End: 2025-02-14 | Stop reason: SDUPTHER

## 2025-02-12 NOTE — PROGRESS NOTES
Refill sent for Oxycodone to be filled 2/14/25    I have personally reviewed the OARRS report. This report is scanned into the electronic medical record. I have considered the risks of abuse, dependence, addiction and diversion.     Patient with recent history of major reconstructive surgery necessitating narcotic medications at higher doses during the post-operative period of 6 weeks.     Will continue with close monitoring and short course refills.

## 2025-02-14 ENCOUNTER — TELEPHONE (OUTPATIENT)
Dept: NEUROSURGERY | Facility: CLINIC | Age: 85
End: 2025-02-14
Payer: MEDICARE

## 2025-02-14 DIAGNOSIS — G89.18 POST-OPERATIVE PAIN: ICD-10-CM

## 2025-02-14 RX ORDER — OXYCODONE HYDROCHLORIDE 5 MG/1
5 TABLET ORAL EVERY 6 HOURS PRN
Qty: 28 TABLET | Refills: 0 | Status: SHIPPED | OUTPATIENT
Start: 2025-02-14 | End: 2025-02-21

## 2025-02-14 NOTE — PROGRESS NOTES
Refill previously sent on 2/12/24 had to be sent to a new pharmacy due to insufficient quantity at previous pharmacy.     I have personally reviewed the OARRS report. This report is scanned into the electronic medical record. I have considered the risks of abuse, dependence, addiction and diversion.     Patient with recent history of major reconstructive surgery necessitating narcotic medications at higher doses during the post-operative period of 6 weeks.     Will continue with close monitoring and short course refills.

## 2025-02-18 ENCOUNTER — OFFICE VISIT (OUTPATIENT)
Facility: CLINIC | Age: 85
End: 2025-02-18
Payer: MEDICARE

## 2025-02-18 VITALS
SYSTOLIC BLOOD PRESSURE: 124 MMHG | HEIGHT: 67 IN | HEART RATE: 85 BPM | TEMPERATURE: 97.6 F | DIASTOLIC BLOOD PRESSURE: 89 MMHG | WEIGHT: 187 LBS | BODY MASS INDEX: 29.35 KG/M2 | RESPIRATION RATE: 16 BRPM

## 2025-02-18 DIAGNOSIS — G89.18 POST-OPERATIVE PAIN: ICD-10-CM

## 2025-02-18 PROCEDURE — 1125F AMNT PAIN NOTED PAIN PRSNT: CPT | Performed by: NURSE PRACTITIONER

## 2025-02-18 PROCEDURE — 3074F SYST BP LT 130 MM HG: CPT | Performed by: NURSE PRACTITIONER

## 2025-02-18 PROCEDURE — 99211 OFF/OP EST MAY X REQ PHY/QHP: CPT | Performed by: NURSE PRACTITIONER

## 2025-02-18 PROCEDURE — 1159F MED LIST DOCD IN RCRD: CPT | Performed by: NURSE PRACTITIONER

## 2025-02-18 PROCEDURE — 1123F ACP DISCUSS/DSCN MKR DOCD: CPT | Performed by: NURSE PRACTITIONER

## 2025-02-18 PROCEDURE — 1036F TOBACCO NON-USER: CPT | Performed by: NURSE PRACTITIONER

## 2025-02-18 PROCEDURE — 3079F DIAST BP 80-89 MM HG: CPT | Performed by: NURSE PRACTITIONER

## 2025-02-18 RX ORDER — OXYCODONE HYDROCHLORIDE 5 MG/1
5 TABLET ORAL EVERY 6 HOURS PRN
Qty: 28 TABLET | Refills: 0 | Status: SHIPPED | OUTPATIENT
Start: 2025-02-21 | End: 2025-02-28

## 2025-02-18 RX ORDER — CYCLOBENZAPRINE HCL 5 MG
5 TABLET ORAL 3 TIMES DAILY PRN
Qty: 60 TABLET | Refills: 0 | Status: SHIPPED | OUTPATIENT
Start: 2025-02-18 | End: 2025-03-20

## 2025-02-18 ASSESSMENT — PATIENT HEALTH QUESTIONNAIRE - PHQ9
2. FEELING DOWN, DEPRESSED OR HOPELESS: NOT AT ALL
SUM OF ALL RESPONSES TO PHQ9 QUESTIONS 1 AND 2: 0
1. LITTLE INTEREST OR PLEASURE IN DOING THINGS: NOT AT ALL

## 2025-02-18 ASSESSMENT — PAIN SCALES - GENERAL: PAINLEVEL_OUTOF10: 7

## 2025-02-18 NOTE — PROGRESS NOTES
[facility] [place]  Department of Neurological Surgery  Post Operative Patient Visit      History of Present Illness:  Curtis Brown is a 84 y.o.  male who presents to the spine clinic in a post operative visit.  He underwent L4-S1 decompression at [facility] by Dr. Zia Church on 1/30/25. He was discharged home on 1/31/25.    Since surgery, he reports he is doing well. He is ambulating without difficulty. Reports leg pain has improved. Reports some achy low back pain that is intermittent. He is taking Oxycodone, Tylenol and Flexeril at home for pain relief. He is able to complete ADL's with minimal assistance from his wife.       On exam:  A&O x 3, speech clear / fluent  Respirations even / unlabored  CUEVAS without focal deficit  SURGICAL INCISION is: well approximated, no erythema / swelling / drainage. Some scabbing noted, incision is healing well.  Gait is steady with wheeled walker    Curtis Brown is progressing well post operatively. He agrees to follow up with Dr. Church as previously scheduled, 4/18/25. Refills provided today for Flexeril and Oxycodone with fill date of 2/21/25.        I have personally reviewed the OARRS report. This report is scanned into the electronic medical record. I have considered the risks of abuse, dependence, addiction and diversion.     Patient with recent history of major reconstructive surgery necessitating narcotic medications at higher doses during the post-operative period of 6 weeks.     Will continue with close monitoring and short course refills.         The above clinical summary has been dictated with voice recognition software. It has not been proofread for grammatical errors, typographical mistakes, or other semantic inconsistencies.     Thank you for visiting our office today. It was our pleasure to take part in your healthcare.      Do not hesitate to call with any questions regarding your plan of care after leaving at [phone] M-F 8am-4pm.       To clinicians, thank you very much for this kind referral. It is a privilege to partner with you in the care of your patients. My office would be delighted to assist you with any further consultations or with questions regarding the plan of care outlined. Do not hesitate to call the office or contact me directly.         Sincerely,          Angella [place] [address] [address] [place], [place] [id number]    [address] [place] [address]  [address] [place] [id number]     Phone: [phone]  Fax: [phone]

## 2025-03-03 ENCOUNTER — TELEPHONE (OUTPATIENT)
Dept: NEUROSURGERY | Facility: CLINIC | Age: 85
End: 2025-03-03
Payer: MEDICARE

## 2025-03-03 DIAGNOSIS — Z98.890 S/P LAMINECTOMY: Primary | ICD-10-CM

## 2025-03-03 DIAGNOSIS — G89.18 POST-OPERATIVE PAIN: ICD-10-CM

## 2025-03-03 RX ORDER — OXYCODONE HYDROCHLORIDE 5 MG/1
5 TABLET ORAL EVERY 6 HOURS PRN
Qty: 28 TABLET | Refills: 0 | Status: SHIPPED | OUTPATIENT
Start: 2025-03-03 | End: 2025-03-10

## 2025-03-03 RX ORDER — OXYCODONE HYDROCHLORIDE 5 MG/1
5 TABLET ORAL EVERY 6 HOURS PRN
Qty: 28 TABLET | Refills: 0 | Status: SHIPPED | OUTPATIENT
Start: 2025-03-03 | End: 2025-03-03 | Stop reason: SDUPTHER

## 2025-03-05 NOTE — ASSESSMENT & PLAN NOTE
10/9/23 echocardiogram moderate to severe aortic stenosis, mild to moderate AI, LVEF=60-65%, TR with normal RVSP (reveiwed tdoay)   Infusion Nursing Note:  Gallo Navarro presents today for Cycle 6 Day 1 Irinotecan Liposome, Leucovorin, and Fluorouracil pump connect.    Patient seen by provider today: No   present during visit today: Not Applicable.    Note: Patient denies any signs or symptoms of infection. Patient reports ongoing fatigue and BENJAMIN. Neuropathy unchanged. Appetite stable. Patient wishes to proceed with planned treatment today.    Blood glucose in lab, 48; finger poke recheck in infusion, 75. Patient given apple juice in infusion as well.    TORB 03/05/25 @ 3:30pm: Dr. Haile/Cindy Cortez RN: 's. Potassium 3.0; do you want to replace today?  - Replace potassium per protocol.  - Patient can receive an additional liter of NS if he feels dry.    Patient feels like he's drinking enough fluid at home and denies the need for a second liter of NS.    Intravenous Access:  Implanted Port.    Treatment Conditions:  Lab Results   Component Value Date    HGB 7.6 (L) 03/05/2025    WBC 24.5 (H) 03/05/2025    ANEU 20.1 (H) 03/05/2025    ANEUTAUTO 10.9 (H) 02/26/2025     03/05/2025        Lab Results   Component Value Date     03/05/2025    POTASSIUM 3.0 (L) 03/05/2025    MAG 1.9 08/23/2023    CR 0.45 (L) 03/05/2025    DENISE 8.0 (L) 03/05/2025    BILITOTAL 0.4 03/05/2025    ALBUMIN 3.0 (L) 03/05/2025    ALT 17 03/05/2025    AST 30 03/05/2025     Results reviewed, labs MET treatment parameters, ok to proceed with treatment.    Post Infusion Assessment:  Patient tolerated infusion without incident.  Blood return noted pre and post infusion.  Site patent and intact, free from redness, edema or discomfort.  No evidence of extravasations.     Prior to discharge: Port is secured in place with tegaderm and flushed with 10cc NS with positive blood return noted.    Continuous home infusion C-Series connected.    All connectors secured in place and clamps taped open.    Capillary element taped to pt's skin per  protocol.  Infusing at 5.2mL/hr.  Pump Connection double checked with Hiwot Sidhu RN.  Patient instructed to call our clinic or [facility] with any questions or concerns at home.  Patient verbalized understanding.    Patient set up for pump disconnect at home with [facility] on Friday, March 7th at 3pm.      Discharge Plan:   Patient declined prescription refills.  Discharge instructions reviewed with: Patient and Family.  Patient and/or family verbalized understanding of discharge instructions and all questions answered.  AVS to patient via [place].  Patient will return in three weeks for next appointment. IB sent to scheduling and RNCC to get this scheduled. Patient aware to watch for this to be scheduled.  Patient discharged in stable condition accompanied by: self and neighbor.  Departure Mode: Wheelchair.      Cindy Cortez RN

## 2025-03-10 ENCOUNTER — APPOINTMENT (OUTPATIENT)
Dept: PRIMARY CARE | Facility: CLINIC | Age: 85
End: 2025-03-10
Payer: MEDICARE

## 2025-03-11 ENCOUNTER — TELEPHONE (OUTPATIENT)
Dept: PRIMARY CARE | Facility: CLINIC | Age: 85
End: 2025-03-11
Payer: MEDICARE

## 2025-03-11 DIAGNOSIS — E78.1 HYPERTRIGLYCERIDEMIA: ICD-10-CM

## 2025-03-12 RX ORDER — FENOFIBRATE 48 MG/1
48 TABLET, FILM COATED ORAL DAILY
Qty: 90 TABLET | Refills: 3 | Status: SHIPPED | OUTPATIENT
Start: 2025-03-12 | End: 2026-03-07

## 2025-03-26 LAB
NON-UH HIE A/G RATIO: 1.3
NON-UH HIE ALB: 4.1 G/DL (ref 3.4–5)
NON-UH HIE ALK PHOS: 41 UNIT/L (ref 45–117)
NON-UH HIE BILIRUBIN, TOTAL: 0.7 MG/DL (ref 0.3–1.2)
NON-UH HIE BUN/CREAT RATIO: 13.8
NON-UH HIE BUN: 18 MG/DL (ref 9–23)
NON-UH HIE CALCIUM: 10.3 MG/DL (ref 8.7–10.4)
NON-UH HIE CALCULATED LDL CHOLESTEROL: ABNORMAL MG/DL (ref 60–130)
NON-UH HIE CALCULATED OSMOLALITY: 282 MOSM/KG (ref 275–295)
NON-UH HIE CHLORIDE: 100 MMOL/L (ref 98–107)
NON-UH HIE CHOLESTEROL: 170 MG/DL (ref 100–200)
NON-UH HIE CO2, VENOUS: 27 MMOL/L (ref 20–31)
NON-UH HIE CREATININE: 1.3 MG/DL (ref 0.6–1.1)
NON-UH HIE DIRECT LDL: 78 MG/DL
NON-UH HIE GFR AA: >60
NON-UH HIE GLOBULIN: 3.2 G/DL
NON-UH HIE GLOMERULAR FILTRATION RATE: 53 ML/MIN/1.73M?
NON-UH HIE GLUCOSE: 182 MG/DL (ref 74–106)
NON-UH HIE GOT: 25 UNIT/L (ref 15–37)
NON-UH HIE GPT: 29 UNIT/L (ref 10–49)
NON-UH HIE HDL CHOLESTEROL: 37 MG/DL (ref 40–60)
NON-UH HIE HGB A1C: 6.2 %
NON-UH HIE K: 3.8 MMOL/L (ref 3.5–5.1)
NON-UH HIE NA: 138 MMOL/L (ref 135–145)
NON-UH HIE TOTAL CHOL/HDL CHOL RATIO: 4.6
NON-UH HIE TOTAL PROTEIN: 7.3 G/DL (ref 5.7–8.2)
NON-UH HIE TRIGLYCERIDES: 411 MG/DL (ref 30–150)
NON-UH HIE TSH: 3.14 UIU/ML (ref 0.55–4.78)
NON-UH HIE VIT D 25: 35 NG/ML

## 2025-04-18 ENCOUNTER — APPOINTMENT (OUTPATIENT)
Dept: NEUROSURGERY | Facility: CLINIC | Age: 85
End: 2025-04-18
Payer: MEDICARE

## 2025-04-18 VITALS
SYSTOLIC BLOOD PRESSURE: 112 MMHG | TEMPERATURE: 97.3 F | HEIGHT: 67 IN | BODY MASS INDEX: 29.03 KG/M2 | DIASTOLIC BLOOD PRESSURE: 68 MMHG | HEART RATE: 76 BPM | WEIGHT: 185 LBS

## 2025-04-18 DIAGNOSIS — M54.16 LUMBAR RADICULOPATHY: Primary | ICD-10-CM

## 2025-04-18 PROCEDURE — 1123F ACP DISCUSS/DSCN MKR DOCD: CPT | Performed by: STUDENT IN AN ORGANIZED HEALTH CARE EDUCATION/TRAINING PROGRAM

## 2025-04-18 PROCEDURE — 99024 POSTOP FOLLOW-UP VISIT: CPT | Performed by: STUDENT IN AN ORGANIZED HEALTH CARE EDUCATION/TRAINING PROGRAM

## 2025-04-18 PROCEDURE — 3078F DIAST BP <80 MM HG: CPT | Performed by: STUDENT IN AN ORGANIZED HEALTH CARE EDUCATION/TRAINING PROGRAM

## 2025-04-18 PROCEDURE — 1036F TOBACCO NON-USER: CPT | Performed by: STUDENT IN AN ORGANIZED HEALTH CARE EDUCATION/TRAINING PROGRAM

## 2025-04-18 PROCEDURE — 1125F AMNT PAIN NOTED PAIN PRSNT: CPT | Performed by: STUDENT IN AN ORGANIZED HEALTH CARE EDUCATION/TRAINING PROGRAM

## 2025-04-18 PROCEDURE — 1159F MED LIST DOCD IN RCRD: CPT | Performed by: STUDENT IN AN ORGANIZED HEALTH CARE EDUCATION/TRAINING PROGRAM

## 2025-04-18 PROCEDURE — 3074F SYST BP LT 130 MM HG: CPT | Performed by: STUDENT IN AN ORGANIZED HEALTH CARE EDUCATION/TRAINING PROGRAM

## 2025-04-18 RX ORDER — OMEGA-3-ACID ETHYL ESTERS 1 G/1
CAPSULE, LIQUID FILLED ORAL
COMMUNITY
Start: 2025-04-11

## 2025-04-18 ASSESSMENT — PAIN SCALES - GENERAL: PAINLEVEL_OUTOF10: 8

## 2025-04-18 ASSESSMENT — PATIENT HEALTH QUESTIONNAIRE - PHQ9
2. FEELING DOWN, DEPRESSED OR HOPELESS: NOT AT ALL
SUM OF ALL RESPONSES TO PHQ9 QUESTIONS 1 & 2: 0
1. LITTLE INTEREST OR PLEASURE IN DOING THINGS: NOT AT ALL

## 2025-04-18 NOTE — PROGRESS NOTES
[facility] [place]  Department of Neurological Surgery  Post Operative Patient Visit      History of Present Illness:  Curtis Brown is a 84 y.o. year old male who presents to the spine clinic in a post operative visit. Since surgery they are 2-3 months s/p L4-S1 decompression on 1/30/25. He is doing better. He has good days and bad days. He is off his medications now and is just taking Tylenol as needed. On bad days, he has return of symptoms with lumbar radiculopathy in the L5 distribution in back of legs, the pain can get up to an 8/10. I will order him new x-rays and I did discuss an MRI of lumbar spine with him because he states he has been worsening over the last 6 weeks. I will follow up with him after obtaining imaging.      The above clinical summary has been dictated with voice recognition software. It has not been proofread for grammatical errors, typographical mistakes, or other semantic inconsistencies.    Thank you for visiting our office today. It was our pleasure to take part in your healthcare.     Do not hesitate to call with any questions regarding your plan of care after leaving at [phone] M-F 8am-4pm.     To clinicians, thank you very much for this kind referral. It is a privilege to partner with you in the care of your patients. My office would be delighted to assist you with any further consultations or with questions regarding the plan of care outlined. Do not hesitate to call the office or contact me directly.       Sincerely,      Zia Church MD, [facility]  Spine , [facility]  Micheal Theodore Chair in Spinal Neurosurgery  Complex Spine Surgery Fellowship Director   of Neurological Surgery  [facility] [employer]  Phone: [phone]  Fax: [phone]        Scribe Attestation  By signing my name below, I, Jaswant Jones   attest that this  documentation has been prepared under the direction and in the presence of Zia Church MD.

## 2025-05-06 PROBLEM — N40.1 BENIGN PROSTATIC HYPERPLASIA WITH URINARY FREQUENCY: Status: RESOLVED | Noted: 2023-10-25 | Resolved: 2025-05-06

## 2025-05-06 PROBLEM — R35.0 BENIGN PROSTATIC HYPERPLASIA WITH URINARY FREQUENCY: Status: RESOLVED | Noted: 2023-10-25 | Resolved: 2025-05-06

## 2025-05-06 PROBLEM — R09.81 CONGESTION OF PARANASAL SINUS: Status: RESOLVED | Noted: 2024-04-03 | Resolved: 2025-05-06

## 2025-05-06 PROBLEM — J31.0 RHINITIS: Status: RESOLVED | Noted: 2023-01-29 | Resolved: 2025-05-06

## 2025-05-06 PROBLEM — J02.9 PHARYNGITIS: Status: RESOLVED | Noted: 2023-01-29 | Resolved: 2025-05-06

## 2025-05-07 ENCOUNTER — OFFICE VISIT (OUTPATIENT)
Dept: CARDIOLOGY | Facility: CLINIC | Age: 85
End: 2025-05-07
Payer: MEDICARE

## 2025-05-07 ENCOUNTER — HOSPITAL ENCOUNTER (OUTPATIENT)
Dept: CARDIOLOGY | Facility: CLINIC | Age: 85
Discharge: HOME | End: 2025-05-07
Payer: MEDICARE

## 2025-05-07 VITALS
HEART RATE: 76 BPM | HEIGHT: 68 IN | BODY MASS INDEX: 28.34 KG/M2 | SYSTOLIC BLOOD PRESSURE: 130 MMHG | OXYGEN SATURATION: 96 % | WEIGHT: 187 LBS | DIASTOLIC BLOOD PRESSURE: 64 MMHG

## 2025-05-07 DIAGNOSIS — M48.00 SPINAL STENOSIS, UNSPECIFIED SPINAL REGION: ICD-10-CM

## 2025-05-07 DIAGNOSIS — A69.20 LYME DISEASE: ICD-10-CM

## 2025-05-07 DIAGNOSIS — I25.10 ATHSCL HEART DISEASE OF NATIVE CORONARY ARTERY W/O ANG PCTRS: ICD-10-CM

## 2025-05-07 DIAGNOSIS — I45.10 RBBB: ICD-10-CM

## 2025-05-07 DIAGNOSIS — M48.10 DISH (DIFFUSE IDIOPATHIC SKELETAL HYPEROSTOSIS): ICD-10-CM

## 2025-05-07 DIAGNOSIS — I35.0 NONRHEUMATIC AORTIC VALVE STENOSIS: Primary | ICD-10-CM

## 2025-05-07 DIAGNOSIS — I31.39 PERICARDIAL EFFUSION (HHS-HCC): ICD-10-CM

## 2025-05-07 DIAGNOSIS — I35.0 NONRHEUMATIC AORTIC VALVE STENOSIS: ICD-10-CM

## 2025-05-07 DIAGNOSIS — Z95.5 PRESENCE OF STENT IN CORONARY ARTERY: ICD-10-CM

## 2025-05-07 DIAGNOSIS — E11.9 TYPE 2 DIABETES MELLITUS WITHOUT COMPLICATION, WITHOUT LONG-TERM CURRENT USE OF INSULIN: ICD-10-CM

## 2025-05-07 DIAGNOSIS — I10 PRIMARY HYPERTENSION: ICD-10-CM

## 2025-05-07 DIAGNOSIS — I73.9 PVD (PERIPHERAL VASCULAR DISEASE) (CMS-HCC): ICD-10-CM

## 2025-05-07 DIAGNOSIS — E03.9 HYPOTHYROIDISM, UNSPECIFIED TYPE: ICD-10-CM

## 2025-05-07 DIAGNOSIS — F41.9 ANXIETY DISORDER, UNSPECIFIED TYPE: ICD-10-CM

## 2025-05-07 LAB
AORTIC VALVE MEAN GRADIENT: 39 MMHG
AORTIC VALVE PEAK VELOCITY: 4.37 M/S
ATRIAL RATE: 73 BPM
AV PEAK GRADIENT: 77 MMHG
AVA (PEAK VEL): 0.67 CM2
AVA (VTI): 0.74 CM2
BODY SURFACE AREA: 2.02 M2
EJECTION FRACTION APICAL 4 CHAMBER: 54.4
EJECTION FRACTION: 58 %
LEFT ATRIUM VOLUME AREA LENGTH INDEX BSA: 28.2 ML/M2
LEFT VENTRICLE INTERNAL DIMENSION DIASTOLE: 5.16 CM (ref 3.5–6)
LEFT VENTRICULAR OUTFLOW TRACT DIAMETER: 2.24 CM
P AXIS: -17 DEGREES
P OFFSET: 189 MS
P ONSET: 123 MS
PR INTERVAL: 168 MS
Q ONSET: 207 MS
QRS COUNT: 12 BEATS
QRS DURATION: 142 MS
QT INTERVAL: 430 MS
QTC CALCULATION(BAZETT): 473 MS
QTC FREDERICIA: 459 MS
R AXIS: -14 DEGREES
RIGHT VENTRICLE FREE WALL PEAK S': 0.13 CM/S
RIGHT VENTRICLE PEAK SYSTOLIC PRESSURE: 27.2 MMHG
T AXIS: -86 DEGREES
T OFFSET: 422 MS
TRICUSPID ANNULAR PLANE SYSTOLIC EXCURSION: 2.1 CM
VENTRICULAR RATE: 73 BPM

## 2025-05-07 PROCEDURE — 93010 ELECTROCARDIOGRAM REPORT: CPT | Performed by: INTERNAL MEDICINE

## 2025-05-07 PROCEDURE — 1160F RVW MEDS BY RX/DR IN RCRD: CPT | Performed by: INTERNAL MEDICINE

## 2025-05-07 PROCEDURE — 99215 OFFICE O/P EST HI 40 MIN: CPT | Performed by: INTERNAL MEDICINE

## 2025-05-07 PROCEDURE — 93306 TTE W/DOPPLER COMPLETE: CPT | Performed by: INTERNAL MEDICINE

## 2025-05-07 PROCEDURE — 99212 OFFICE O/P EST SF 10 MIN: CPT | Mod: 25

## 2025-05-07 PROCEDURE — 1036F TOBACCO NON-USER: CPT | Performed by: INTERNAL MEDICINE

## 2025-05-07 PROCEDURE — 93306 TTE W/DOPPLER COMPLETE: CPT

## 2025-05-07 PROCEDURE — 3078F DIAST BP <80 MM HG: CPT | Performed by: INTERNAL MEDICINE

## 2025-05-07 PROCEDURE — 1159F MED LIST DOCD IN RCRD: CPT | Performed by: INTERNAL MEDICINE

## 2025-05-07 PROCEDURE — 93005 ELECTROCARDIOGRAM TRACING: CPT | Performed by: INTERNAL MEDICINE

## 2025-05-07 PROCEDURE — 3075F SYST BP GE 130 - 139MM HG: CPT | Performed by: INTERNAL MEDICINE

## 2025-05-07 RX ORDER — GLIMEPIRIDE 4 MG/1
4 TABLET ORAL
COMMUNITY
Start: 2025-04-11

## 2025-05-07 RX ORDER — ERGOCALCIFEROL 1.25 MG/1
1.25 CAPSULE ORAL WEEKLY
COMMUNITY

## 2025-05-07 RX ORDER — ATORVASTATIN CALCIUM 40 MG/1
40 TABLET, FILM COATED ORAL DAILY
COMMUNITY

## 2025-05-07 NOTE — PROGRESS NOTES
"  Shahrzad Brown  is a 84 y.o. year old male who presents for F/U severe aortic stenosis, remote \pericardial effusion. Discharged [facility] 1/31/25 with S/P L4-S1 decompression fracture (Discharge summary reviewed). Slight dizziness from vertigo.  No chest pain, no dyspnea, no palpitations, no edema buts notes exertional throat sensation with walking since back procedure.  Wants to  wait on consideration of  TAVR    Blood pressure 130/64, pulse 76, height 1.727 m (5' 8\"), weight 84.8 kg (187 lb), SpO2 96%.   Gabapentin, Morphine, Primidone, Penicillins, and Valacyclovir  Medical History[1]  Surgical History[2]  Family History[3]  @SOC    Current Medications[4]     ROS  Review of Systems   All other systems reviewed and are negative.      Physical Exam  Physical Exam  Constitutional:       Appearance: Normal appearance.   HENT:      Head: Normocephalic and atraumatic.          EKG  Encounter Date: 05/07/25   ECG 12 Lead   Result Value    Ventricular Rate 73    Atrial Rate 73    NV Interval 168    QRS Duration 142    QT Interval 430    QTC Calculation(Bazett) 473    P Axis -17    R Axis -14    T Axis -86    QRS Count 12    Q Onset 207    P Onset 123    P Offset 189    T Offset 422    QTC Fredericia 459    Narrative    Normal sinus rhythm  Right bundle branch block  Abnormal ECG  When compared with ECG of 21-JAN-2025 10:26,  Fusion complexes are no longer Present  Premature ventricular complexes are no longer Present  QT has shortened       Problem List Items Addressed This Visit       Anxiety disorder    AS (aortic stenosis) - Primary    5/7/25 echocardiogram severe aortic stenosis, mil AI, moderate concentric LVH with LVEF= 55-60%, TR with normal RVSP, mild MR (Reviewed today)         Relevant Orders    ECG 12 Lead (Completed)    Athscl heart disease of native coronary artery w/o ang pctrs    Type 2 diabetes mellitus    Hypothyroidism    Pericardial effusion (HHS-HCC)    Resolved         Presence of stent " in coronary artery    Hypertension    PVD (peripheral vascular disease) (CMS-[facility])    DISH (diffuse idiopathic skeletal hyperostosis)    Lyme disease    RBBB    Spinal stenosis         Same meds  Return 3 months to discuss possible TAVR    Anil Lindsey MD        [1]   Past Medical History:  Diagnosis Date    Abnormal result of other cardiovascular function study     Abnormal nuclear stress test    Acute ischemic heart disease, unspecified     ACS (acute coronary syndrome)    Atherosclerotic heart disease of native coronary artery without angina pectoris     Arteriosclerotic coronary artery disease    Chronic sinusitis, unspecified     Chronic congestion of paranasal sinus    Congenital heart disease     Coronary angioplasty status     History of percutaneous coronary intervention    Encounter for preprocedural cardiovascular examination     Pre-operative cardiovascular examination    Encounter for screening for malignant neoplasm of prostate     Screening PSA (prostate specific antigen)    Heart disease     Hypertension     Lyme disease, unspecified 12/02/2013    Lyme disease    Other cervical disc displacement, unspecified cervical region     Herniated cervical disc without myelopathy    Other specified postprocedural states     History of carpal tunnel surgery    Other systemic sclerosis     Cutaneous scleroderma    Personal history of malignant neoplasm, unspecified     History of malignant neoplasm    Personal history of other diseases of the circulatory system     History of hypertension    Personal history of other diseases of the circulatory system     History of abnormal electrocardiography    Personal history of other diseases of the digestive system     History of gastroesophageal reflux (GERD)    Personal history of other diseases of the musculoskeletal system and connective tissue     History of low back pain    Personal history of other diseases of the musculoskeletal system and connective tissue      History of spinal stenosis    Personal history of other diseases of the musculoskeletal system and connective tissue     History of arthritis    Personal history of other endocrine, nutritional and metabolic disease     History of hypothyroidism    Personal history of other endocrine, nutritional and metabolic disease     History of hyperlipidemia    Personal history of other endocrine, nutritional and metabolic disease     History of type 2 diabetes mellitus    Personal history of other endocrine, nutritional and metabolic disease     History of hypoglycemia    Personal history of other specified conditions     History of chest pain    Polyp of stomach and duodenum     Gastric polyp    Type 2 diabetes mellitus    [2]   Past Surgical History:  Procedure Laterality Date    COLONOSCOPY      CORONARY ANGIOPLASTY WITH STENT PLACEMENT  07/27/2018    Cath Placement Of Stent 1    HAND SURGERY  07/27/2018    Hand Surgery                                                                                                                                                          OTHER SURGICAL HISTORY  07/27/2018    Surgery    OTHER SURGICAL HISTORY  07/27/2018    Total Hip Replacement Left   [3]   Family History  Problem Relation Name Age of Onset    Stroke Mother      Heart failure Mother      Other (hepatic cirrhosis) Father      Parkinsonism Brother     [4]   Current Outpatient Medications   Medication Sig Dispense Refill    glimepiride (Amaryl) 4 mg tablet Take 1 tablet (4 mg) by mouth once daily in the morning. Take before meals.      Accu-Chek Pippa Plus test strp strip USE TO TEST ONCE DAILY.      Accu-Chek Fastclix Lancet Drum misc 1 Lancet early in the morning..      amLODIPine (Norvasc) 10 mg tablet TAKE 1 TABLET BY MOUTH ONCE  DAILY 90 tablet 3    aspirin 81 mg chewable tablet Chew 1 tablet (81 mg) once daily. Ok to restart 2/7/25      atorvastatin (Lipitor) 40 mg tablet Take 1 tablet (40 mg) by mouth once daily.       bisacodyl (Dulcolax) 5 mg EC tablet Take 1 tablet (5 mg) by mouth once daily as needed for constipation for up to 10 doses. Do not crush, chew, or split. 10 tablet 0    coenzyme Q-10 300 mg capsule capsule Take 1 capsule (300 mg) by mouth once daily.      ergocalciferol (Vitamin D-2) 1250 mcg (50,000 units) capsule Take 1 capsule (1.25 mg) by mouth 1 (one) time per week.      famotidine (Pepcid) 40 mg tablet TAKE 1 TABLET BY MOUTH TWICE  DAILY 180 tablet 3    fenofibrate (Tricor) 48 mg tablet Take 1 tablet (48 mg) by mouth once daily. 90 tablet 3    finasteride (Proscar) 5 mg tablet Take 1 tablet (5 mg) by mouth once daily. (Patient taking differently: Take 1.25 mg by mouth once daily.) 90 tablet 3    glimepiride (Amaryl) 1 mg tablet Take 1 tablet (1 mg) by mouth once daily in the evening.  Take before meals.      hydroCHLOROthiazide (HYDRODiuril) 25 mg tablet Take 1 tablet (25 mg) by mouth once daily. 90 tablet 3    insulin lispro (HUMALOG KWIKPEN INSULIN SUBQ) Inject under the skin 3 times a day as needed. Sliding Scale:  150-200= 2 units  201-250= 4 units  251-300= 6 units      lactobacillus acidophilus capsule Take 1 capsule by mouth once daily. With breakfast      levothyroxine (Synthroid, Levoxyl) 50 mcg tablet Take 1 tablet (50 mcg) by mouth 5 times a week. Omit Wednesday & Sunday      levothyroxine (Synthroid, Levoxyl) 50 mcg tablet Take 2 tablets (100 mcg) by mouth 2 times a week. Take on an empty stomach at the same time each day, either 30 to 60 minutes prior to breakfast- Give on Wednesday & Sunday      Livalo 4 mg tablet Take 4 mg by mouth once daily in the evening.  Take before meals.      losartan (Cozaar) 50 mg tablet Take 1 tablet (50 mg) by mouth once daily.      metoprolol tartrate (Lopressor) 25 mg tablet TAKE 1 TABLET BY MOUTH TWICE  DAILY 180 tablet 3    multivitamin tablet Take 1 tablet by mouth once daily.      nitroglycerin (Nitrostat) 0.4 mg SL tablet Place 1 tablet (0.4 mg) under the  tongue every 5 minutes if needed for chest pain. Under the tongue as needed for chest pain 25 tablet 2    omega-3 acid ethyl esters (Lovaza) 1 gram capsule       potassium chloride CR (K-Tab) 20 mEq ER tablet Take 1 tablet (20 mEq) by mouth 2 times a day.      psyllium (Metamucil) powder Take by mouth once daily. 2 teaspoonfuls      vit C/E/Zn/coppr/lutein/zeaxan (PRESERVISION AREDS-2 ORAL) Take 1 tablet by mouth 2 times a day.      vit C/zinc citrate/elderberry (SAMBUCUS ELDERBERRY ORAL) Take 1 capsule by mouth once daily.       No current facility-administered medications for this visit.

## 2025-05-07 NOTE — ASSESSMENT & PLAN NOTE
5/7/25 echocardiogram severe aortic stenosis, mil AI, moderate concentric LVH with LVEF= 55-60%, TR with normal RVSP, mild MR (Reviewed today)

## 2025-05-15 ENCOUNTER — TELEPHONE (OUTPATIENT)
Dept: PRIMARY CARE | Facility: CLINIC | Age: 85
End: 2025-05-15
Payer: MEDICARE

## 2025-05-15 ENCOUNTER — TELEPHONE (OUTPATIENT)
Facility: CLINIC | Age: 85
End: 2025-05-15
Payer: MEDICARE

## 2025-05-15 NOTE — TELEPHONE ENCOUNTER
----- Message from Zia Church sent at 5/15/2025 12:52 PM EDT -----  Regarding: Results Review  Can I just see back to discuss MRI and progress June 30 works for this one       ----- Message -----  From: Douglas Ordaz - Imaging Results In  Sent: 5/5/2025   2:53 PM EDT  To: Zia Church MD

## 2025-05-19 ENCOUNTER — APPOINTMENT (OUTPATIENT)
Dept: PRIMARY CARE | Facility: CLINIC | Age: 85
End: 2025-05-19
Payer: MEDICARE

## 2025-05-19 VITALS
HEART RATE: 59 BPM | DIASTOLIC BLOOD PRESSURE: 68 MMHG | OXYGEN SATURATION: 95 % | BODY MASS INDEX: 28.34 KG/M2 | SYSTOLIC BLOOD PRESSURE: 130 MMHG | WEIGHT: 187 LBS | HEIGHT: 68 IN

## 2025-05-19 DIAGNOSIS — E11.42 TYPE 2 DIABETES MELLITUS WITH DIABETIC POLYNEUROPATHY, WITHOUT LONG-TERM CURRENT USE OF INSULIN: ICD-10-CM

## 2025-05-19 DIAGNOSIS — E11.69 TYPE 2 DIABETES MELLITUS WITH OTHER SPECIFIED COMPLICATION, WITHOUT LONG-TERM CURRENT USE OF INSULIN: ICD-10-CM

## 2025-05-19 DIAGNOSIS — I10 PRIMARY HYPERTENSION: ICD-10-CM

## 2025-05-19 DIAGNOSIS — M48.00 SPINAL STENOSIS, UNSPECIFIED SPINAL REGION: ICD-10-CM

## 2025-05-19 DIAGNOSIS — E11.9 TYPE 2 DIABETES MELLITUS WITHOUT COMPLICATION, WITHOUT LONG-TERM CURRENT USE OF INSULIN: Primary | ICD-10-CM

## 2025-05-19 PROBLEM — I50.30 DIASTOLIC CHF (MULTI): Status: RESOLVED | Noted: 2023-01-29 | Resolved: 2025-05-19

## 2025-05-19 PROCEDURE — 3075F SYST BP GE 130 - 139MM HG: CPT | Performed by: INTERNAL MEDICINE

## 2025-05-19 PROCEDURE — 1036F TOBACCO NON-USER: CPT | Performed by: INTERNAL MEDICINE

## 2025-05-19 PROCEDURE — 99214 OFFICE O/P EST MOD 30 MIN: CPT | Performed by: INTERNAL MEDICINE

## 2025-05-19 PROCEDURE — 1159F MED LIST DOCD IN RCRD: CPT | Performed by: INTERNAL MEDICINE

## 2025-05-19 PROCEDURE — G2211 COMPLEX E/M VISIT ADD ON: HCPCS | Performed by: INTERNAL MEDICINE

## 2025-05-19 PROCEDURE — 3078F DIAST BP <80 MM HG: CPT | Performed by: INTERNAL MEDICINE

## 2025-05-19 NOTE — PROGRESS NOTES
Subjective   Patient ID: Curtis Brown is a 84 y.o. 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   Following with urology Dr Saldaña. Surveillance only at this time.     Hx spinal stenosis following with dr thorpe and dr jones s/p three spinal injections for steroids.    Patient may not require hydrocodone after he has a steroid injection to spine.   Urine drug screen 12/10/24 ordered  On oxycodone from pain management did not help.   Hydrocodone did not help     lymphocytic colitis diarrhea: stable on diphenoxylate  #30    Not due yet    Hx SEPSIS ACUTE PYELONEPHROSIS 10/2023: resolved at this time    Slight anemia hgb trending 11-12s lately but 1 year ago his hgb was 15. Iron/b12/folte wnl.     Resting tremor:   primidone (made him sick to his stomach)  Will try gabapentin 300mg at bedtime 11/20/24    PSA elevated 6s (October 2023, not normal)  PSA wnl now will  follow up to urology     pericardial effusion: Patient no longer on colchicine and has follow up with dr montes     Hypothyroid: tsh wnl      htn: stable, continue metoprolol.  improved, continue on   hctz to 25mg daily ,   amlodipine 10mg daily.   Losartan 50mg daily       Metoprolol 25mg bid      hld: stable, continue statin. LDL < 70. Patient wants to change Lipitor to Crestor per patient's request. Patient went to Dr Rodrigues and now is on livalo/pitavastatin, but its too expensive. He is thinking about changing his statin back to crestor      Hyper triglycerides: started on lovaza     Severe aortic stenosis: last echo October 2024 with normal ef. stable follows with dr montes.     Dry macular degeneration: stable following with Dr Andrew @ [facility]     dm2: stable, 6s    Patient follows with dr rodrigues   5 mg tablet Amaryl daily . Watch for low sugars.      PVD: stable, continue statin and aspirin      diastolic  CHF and severe aortic stenosis: stable, patient is very active. stable. getting echo with dr montes twice a year      BPH: patient is taking finasteride. He denies any complaints and he needs a refill       patient does not have children. needs to consider POA outs       Colonoscopy may 2021 with dr jenkins           hpi  male cad s/p stent 11/2013, PVD, htn, hld comes for the following    spinal stenosis: pain intermittent, in gluteal region. Going down both legs. Feels like hips are in a vice. Hurts to move his legs. He is seeing spinal Dr Cat who recommended dr jones. Patient is not doing well and would like to get hydrocodone again. He cannot go grocery shopping without significant pain. He did fail steroid injections to his spine with dr jones. When he gets up in the morning his pain is the worse and that is when he needs pain medications.      Other medications, see below  HX PERICARDITIS AND PERICARDIAL EFFUSION sEPTEMBER 2022: this has since resolved. and he follows with cardiology regularly for this     Resting tremors: worsening in his hands and he is UNABLE TO  hold objects in his hands.     lymphocytic colitis (biopsy + May 18, 2021)diarrhea: patient is on diphenoxylate/atropine for his diarrhea on a regular basis. he was seen by Dr Jenkins (who has just recently retired). he is controlled on dipheoxylate-atropine. he does on occasion during the month require an additional dose. patient continues to do well on his current management      diastolic chf: Patient denies any chest pain, angina or exertional dyspnea. patient denies any swelling to lower extremities. Patient follows with cardiology on a regular basis      Severe  aortic valve stenosis      Diabetes Type 2: patient denies any low blood sugars. Patient is following with opthalmology on a yearly basis. Patient is taking glimepiride a1c 5s typically. patient following with dr christina.      hypothyroid: patient denies any hypo or hypothyroid  symptoms. patient denies any palpitations, diarrhea or constipation     HLD: Patient denies any muscle aches and is tolerating statin therapy     pvd: denies any previous strokes, no headaches. no claudication        social: patient denies any smoking, drug or alcohol abuse     surgical history: left hip replacement 11/2017     COL[facility]OSCOPY MAY 2021 [facility] with 5 year follow up      HOSPITALIZATIONS  @ [facility] from April 5, 2024 to April 7, 2024 as he had left-sided flank pain that radiated down to his left groin into his testicles.  He says this woke him up out of his early sleep he decided come emergency department for further assessment.  CT scan of the abdomen pelvis shows a left renal mass which is a known mass.  However there is some mild perinephric stranding as well.  Urinalysis was negative for infection labs were unremarkable.  His pain was much better controlled he was seen by urology who did not see any evidence of urological cause.  Heme-onc saw the patient and agreed about the left renal mass being a longstanding issue without any further intervention.    @[facility] from October 30, 2023 to November 3, 2023 patient's was hospitalized for subjective fevers and sweats.  He was found to have a UTI and left pyelonephritis.  Patient was treated for E. coli growing in his urine sensitive to most antibiotics except ampicillin and Unasyn.  Patient was recommended to continue on Cipro 500 mg twice a day for an additional 10 days posthospitalization.  He is noted to have a left solid renal cyst.  An MRI of the abdomen was done showing pyelonephritis but also a 1.6 cm left renal lesion which is characteristic of a hemorrhagic cyst.  Patient's losartan was still on hold upon discharge.        September 3 to September 7, 2022 @[facility] . Patient came in originally to [facility] for shortness of breath and chest pain. Patient was found to have acute pericarditis  "along with moderate-sized pericardial effusion. He was placed on colchicine twice a day and his symptoms dramatically improved. Cardiology as well as endocrinology was following him and he was discharged on colchicine.               Visit Vitals  /68 (BP Location: Right arm, Patient Position: Sitting, BP Cuff Size: Adult)   Pulse 59   Ht 1.727 m (5' 8\")   Wt 84.8 kg (187 lb)   SpO2 95%   BMI 28.43 kg/m²   Smoking Status Never   BSA 2.02 m²     PHYSICAL EXAM   General appearance: Alert and in no acute distress. speech is clear and coherent  No head trauma    Respiratory : No respiratory distress, normal respiratory rhythm and effort. Clear bilateral breath sounds. No wheezing or rhonchi.   Cardiovascular: heart rate regular, S1, S2. no murmurs. no Lower extremity edema   MSK: 5/5 strength upper and lower extremities without gait abnormalities. no loss of muscle mass .    Neuro: 2-12 CN grossly intact.  no slurred speech. no lateralizing deficit  Psychiatric Orientation: Oriented to person, place, and time. no depression, homicidal or suicidal thoughts, normal affect     REVIEW OF SYSTEMS     Constitutional: not feeling tired and no fever, chills or sweats. Denies weight loss  no headache  Cardiovascular: no exertional chest pain, no palpitations, no lower extremity edema   Lungs: Denies shortness of breath, exertional dyspnea, wheezing  Gastrointestinal: no change in bowel habits, no diarrhea, no nausea, no vomiting and no abdominal pain.   Musculoskeletal: no myalgias, no muscle weakness and no limb swelling.    Neurological: no headaches, no seizures, no numbness, no lateralizing deficits and no fainting.   Psychiatric: no depression and no anxiety.   Urine: denies polyuria, hematuria, dysuria      Allergies   Allergen Reactions    Gabapentin Dizziness    Morphine Dizziness and Nausea/vomiting    Primidone Nausea/vomiting    Penicillins Hives and Rash    Valacyclovir Rash       Current Outpatient Medications "   Medication Sig Dispense Refill    Accu-Chek Pippa Plus test strp strip USE TO TEST ONCE DAILY.      Accu-Chek Fastclix Lancet Drum misc 1 Lancet early in the morning..      amLODIPine (Norvasc) 10 mg tablet TAKE 1 TABLET BY MOUTH ONCE  DAILY 90 tablet 3    aspirin 81 mg chewable tablet Chew 1 tablet (81 mg) once daily. Ok to restart 2/7/25      atorvastatin (Lipitor) 40 mg tablet Take 1 tablet (40 mg) by mouth once daily.      bisacodyl (Dulcolax) 5 mg EC tablet Take 1 tablet (5 mg) by mouth once daily as needed for constipation for up to 10 doses. Do not crush, chew, or split. 10 tablet 0    coenzyme Q-10 300 mg capsule capsule Take 1 capsule (300 mg) by mouth once daily.      ergocalciferol (Vitamin D-2) 1250 mcg (50,000 units) capsule Take 1 capsule (1.25 mg) by mouth 1 (one) time per week.      famotidine (Pepcid) 40 mg tablet TAKE 1 TABLET BY MOUTH TWICE  DAILY 180 tablet 3    fenofibrate (Tricor) 48 mg tablet Take 1 tablet (48 mg) by mouth once daily. 90 tablet 3    finasteride (Proscar) 5 mg tablet Take 1 tablet (5 mg) by mouth once daily. (Patient taking differently: Take 1.25 mg by mouth once daily.) 90 tablet 3    glimepiride (Amaryl) 1 mg tablet Take 1 tablet (1 mg) by mouth once daily in the evening.  Take before meals.      glimepiride (Amaryl) 4 mg tablet Take 1 tablet (4 mg) by mouth once daily in the morning. Take before meals.      hydroCHLOROthiazide (HYDRODiuril) 25 mg tablet Take 1 tablet (25 mg) by mouth once daily. 90 tablet 3    insulin lispro (HUMALOG KWIKPEN INSULIN SUBQ) Inject under the skin 3 times a day as needed. Sliding Scale:  150-200= 2 units  201-250= 4 units  251-300= 6 units      lactobacillus acidophilus capsule Take 1 capsule by mouth once daily. With breakfast      levothyroxine (Synthroid, Levoxyl) 50 mcg tablet Take 1 tablet (50 mcg) by mouth 5 times a week. Omit Wednesday & Sunday      levothyroxine (Synthroid, Levoxyl) 50 mcg tablet Take 2 tablets (100 mcg) by mouth 2  times a week. Take on an empty stomach at the same time each day, either 30 to 60 minutes prior to breakfast- Give on Wednesday & Sunday      Livalo 4 mg tablet Take 4 mg by mouth once daily in the evening.  Take before meals.      losartan (Cozaar) 50 mg tablet Take 1 tablet (50 mg) by mouth once daily.      metoprolol tartrate (Lopressor) 25 mg tablet TAKE 1 TABLET BY MOUTH TWICE  DAILY 180 tablet 3    multivitamin tablet Take 1 tablet by mouth once daily.      nitroglycerin (Nitrostat) 0.4 mg SL tablet Place 1 tablet (0.4 mg) under the tongue every 5 minutes if needed for chest pain. Under the tongue as needed for chest pain 25 tablet 2    omega-3 acid ethyl esters (Lovaza) 1 gram capsule       potassium chloride CR (K-Tab) 20 mEq ER tablet Take 1 tablet (20 mEq) by mouth 2 times a day.      psyllium (Metamucil) powder Take by mouth once daily. 2 teaspoonfuls      vit C/E/Zn/coppr/lutein/zeaxan (PRESERVISION AREDS-2 ORAL) Take 1 tablet by mouth 2 times a day.      vit C/zinc citrate/elderberry (SAMBUCUS ELDERBERRY ORAL) Take 1 capsule by mouth once daily.       No current facility-administered medications for this visit.       Objective     Hospital Outpatient Visit on 05/07/2025   Component Date Value Ref Range Status    AV pk gabriela 05/07/2025 4.37  m/s Final    AV mn grad 05/07/2025 39  mmHg Final    LVOT diam 05/07/2025 2.24  cm Final    LA vol index A/L 05/07/2025 28.2  ml/m2 Final    Tricuspid annular plane systolic e* 05/07/2025 2.1  cm Final    LV EF 05/07/2025 58  % Final    RV free wall pk S' 05/07/2025 0.13  cm/s Final    LVIDd 05/07/2025 5.16  cm Final    RVSP 05/07/2025 27.2  mmHg Final    Aortic Valve Area by Continuity of* 05/07/2025 0.74  cm2 Final    Aortic Valve Area by Continuity of* 05/07/2025 0.67  cm2 Final    AV pk grad 05/07/2025 77  mmHg Final    LV A4C EF 05/07/2025 54.4   Final    BSA 05/07/2025 2.02  m2 Final   Office Visit on 05/07/2025   Component Date Value Ref Range Status     Ventricular Rate 05/07/2025 73  BPM Final    Atrial Rate 05/07/2025 73  BPM Final    KS Interval 05/07/2025 168  ms Final    QRS Duration 05/07/2025 142  ms Final    QT Interval 05/07/2025 430  ms Final    QTC Calculation(Bazett) 05/07/2025 473  ms Final    P Axis 05/07/2025 -17  degrees Final    R Axis 05/07/2025 -14  degrees Final    T Axis 05/07/2025 -86  degrees Final    QRS Count 05/07/2025 12  beats Final    Q Onset 05/07/2025 207  ms Final    P Onset 05/07/2025 123  ms Final    P Offset 05/07/2025 189  ms Final    T Offset 05/07/2025 422  ms Final    QTC Fredericia 05/07/2025 459  ms Final   Orders Only on 03/26/2025   Component Date Value Ref Range Status    [facility] GFR AA 03/26/2025 >60   Final    [facility] Bilirubin, Total 03/26/2025 0.70  0.30 - 1.20 mg/dL Final    [facility] Chloride 03/26/2025 100  98 - 107 mmol/L Final    [facility] A/G Ratio 03/26/2025 1.3   Final    [facility] BUN/Creat Ratio 03/26/2025 13.8   Final    [facility] Na 03/26/2025 138  135 - 145 mmol/L Final    [facility] GPT 03/26/2025 29  10 - 49 unit/L Final    [facility] Alk Phos 03/26/2025 41 (L)  45 - 117 unit/L Final    [facility] Creatinine 03/26/2025 1.3 (H)  0.6 - 1.1 mg/dL Final    [facility] Total Protein 03/26/2025 7.3  5.7 - 8.2 g/dL Final    [facility] CO2, venous 03/26/2025 27.0  20.0 - 31.0 mmol/L Final    [facility] Glomerular Filtration R* 03/26/2025 53  mL/min/1.73m? Final    [facility] Calculated Osmolality 03/26/2025 282  275 - 295 mOsm/kg Final    [facility] K 03/26/2025 3.8  3.5 - 5.1 mmol/L Final    [facility] Globulin 03/26/2025 3.2  g/dL Final    [facility] BUN 03/26/2025 18  9 - 23 mg/dL Final    [facility] Calcium 03/26/2025 10.3  8.7 - 10.4 mg/dL Final    [facility] GOT 03/26/2025 25  15 - 37 unit/L Final    [facility] ALB 03/26/2025 4.1  3.4 - 5.0 g/dL Final    [facility] Glucose 03/26/2025 182 (H)  74 - 106 mg/dL Final    [facility] TSH 03/26/2025 3.14  0.55 - 4.78 uIU/ml Final    [facility] Direct LDL  03/26/2025 78  mg/dL Final    [facility] Vit D 25 03/26/2025 35  ng/mL Final    [facility] Triglycerides 03/26/2025 411 (H)  30 - 150 mg/dL Final    [facility] Cholesterol 03/26/2025 170  100 - 200 mg/dL Final    [facility] Calculated LDL Choleste* 03/26/2025 NCAL  60 - 130 mg/dL Final    [facility] HDL Cholesterol 03/26/2025 37 (L)  40 - 60 mg/dL Final    [facility] Total Chol/HDL Chol Rat* 03/26/2025 4.6   Final    [facility] HGB A1C 03/26/2025 6.2  % Final   Admission on 01/30/2025, Discharged on 01/31/2025   Component Date Value Ref Range Status    POCT Glucose 01/30/2025 153 (H)  74 - 99 mg/dL Final    POCT Glucose 01/30/2025 172 (H)  74 - 99 mg/dL Final    POCT Glucose 01/30/2025 194 (H)  74 - 99 mg/dL Final    WBC 01/31/2025 13.5 (H)  4.4 - 11.3 x10*3/uL Final    nRBC 01/31/2025 0.0  0.0 - 0.0 /100 WBCs Final    RBC 01/31/2025 3.94 (L)  4.50 - 5.90 x10*6/uL Final    Hemoglobin 01/31/2025 12.3 (L)  13.5 - 17.5 g/dL Final    Hematocrit 01/31/2025 39.4 (L)  41.0 - 52.0 % Final    MCV 01/31/2025 100  80 - 100 fL Final    MCH 01/31/2025 31.2  26.0 - 34.0 pg Final    MCHC 01/31/2025 31.2 (L)  32.0 - 36.0 g/dL Final    RDW 01/31/2025 13.4  11.5 - 14.5 % Final    Platelets 01/31/2025 189  150 - 450 x10*3/uL Final    Glucose 01/31/2025 117 (H)  74 - 99 mg/dL Final    Sodium 01/31/2025 139  136 - 145 mmol/L Final    Potassium 01/31/2025 3.8  3.5 - 5.3 mmol/L Final    Chloride 01/31/2025 101  98 - 107 mmol/L Final    Bicarbonate 01/31/2025 28  21 - 32 mmol/L Final    Anion Gap 01/31/2025 14  10 - 20 mmol/L Final    Urea Nitrogen 01/31/2025 20  6 - 23 mg/dL Final    Creatinine 01/31/2025 1.11  0.50 - 1.30 mg/dL Final    eGFR 01/31/2025 66  >60 mL/min/1.73m*2 Final    Calcium 01/31/2025 9.4  8.6 - 10.3 mg/dL Final    POCT Glucose 01/30/2025 347 (H)  74 - 99 mg/dL Final    POCT Glucose 01/31/2025 132 (H)  74 - 99 mg/dL Final    POCT Glucose 01/31/2025 164 (H)  74 - 99 mg/dL Final   Lab on 01/21/2025   Component Date Value  Ref Range Status    Glucose 01/21/2025 116 (H)  74 - 99 mg/dL Final    Sodium 01/21/2025 138  136 - 145 mmol/L Final    Potassium 01/21/2025 3.9  3.5 - 5.3 mmol/L Final    Chloride 01/21/2025 101  98 - 107 mmol/L Final    Bicarbonate 01/21/2025 26  21 - 32 mmol/L Final    Anion Gap 01/21/2025 15  10 - 20 mmol/L Final    Urea Nitrogen 01/21/2025 25 (H)  6 - 23 mg/dL Final    Creatinine 01/21/2025 1.25  0.50 - 1.30 mg/dL Final    eGFR 01/21/2025 57 (L)  >60 mL/min/1.73m*2 Final    Calcium 01/21/2025 10.1  8.6 - 10.3 mg/dL Final    Prealbumin 01/21/2025 37.9  18.0 - 40.0 mg/dL Final    WBC 01/21/2025 9.2  4.4 - 11.3 x10*3/uL Final    nRBC 01/21/2025 0.0  0.0 - 0.0 /100 WBCs Final    RBC 01/21/2025 4.58  4.50 - 5.90 x10*6/uL Final    Hemoglobin 01/21/2025 14.6  13.5 - 17.5 g/dL Final    Hematocrit 01/21/2025 44.6  41.0 - 52.0 % Final    MCV 01/21/2025 97  80 - 100 fL Final    MCH 01/21/2025 31.9  26.0 - 34.0 pg Final    MCHC 01/21/2025 32.7  32.0 - 36.0 g/dL Final    RDW 01/21/2025 13.6  11.5 - 14.5 % Final    Platelets 01/21/2025 198  150 - 450 x10*3/uL Final    Protime 01/21/2025 10.8  9.8 - 12.8 seconds Final    INR 01/21/2025 1.0  0.9 - 1.1 Final    aPTT 01/21/2025 38  27 - 38 seconds Final    ABO TYPE 01/21/2025 A   Final    Rh TYPE 01/21/2025 POS   Final    ANTIBODY SCREEN 01/21/2025 NEG   Final    Hemoglobin A1C 01/21/2025 6.0 (H)  See comment % Final    Estimated Average Glucose 01/21/2025 126  Not Established mg/dL Final   Pre-Admission Testing on 01/21/2025   Component Date Value Ref Range Status    Staph/MRSA Screen Culture 01/21/2025 No Staphylococcus aureus isolated   Final    Ventricular Rate 01/21/2025 75  BPM Final    Atrial Rate 01/21/2025 75  BPM Final    IN Interval 01/21/2025 166  ms Final    QRS Duration 01/21/2025 136  ms Final    QT Interval 01/21/2025 484  ms Final    QTC Calculation(Bazett) 01/21/2025 540  ms Final    R Axis 01/21/2025 -23  degrees Final    T Axis 01/21/2025 -8  degrees Final     QRS Count 01/21/2025 13  beats Final    Q Onset 01/21/2025 210  ms Final    P Onset 01/21/2025 127  ms Final    P Offset 01/21/2025 182  ms Final    T Offset 01/21/2025 452  ms Final    QTC Fredericia 01/21/2025 521  ms Final       Radiology: Reviewed imaging in powerchart.  No results found.    Family History   Problem Relation Name Age of Onset    Stroke Mother      Heart failure Mother      Other (hepatic cirrhosis) Father      Parkinsonism Brother       Social History     Socioeconomic History    Marital status:    Tobacco Use    Smoking status: Never    Smokeless tobacco: Never   Vaping Use    Vaping status: Never Used   Substance and Sexual Activity    Alcohol use: Never    Drug use: Never     Social Drivers of Health     Financial Resource Strain: Low Risk  (1/30/2025)    Overall Financial Resource Strain (CARDIA)     Difficulty of Paying Living Expenses: Not hard at all   Food Insecurity: No Food Insecurity (1/30/2025)    Hunger Vital Sign     Worried About Running Out of Food in the Last Year: Never true     Ran Out of Food in the Last Year: Never true   Transportation Needs: No Transportation Needs (1/30/2025)    PRAPARE - Transportation     Lack of Transportation (Medical): No     Lack of Transportation (Non-Medical): No   Intimate Partner Violence: Not At Risk (1/30/2025)    Humiliation, Afraid, Rape, and Kick questionnaire     Fear of Current or Ex-Partner: No     Emotionally Abused: No     Physically Abused: No     Sexually Abused: No   Housing Stability: Low Risk  (1/30/2025)    Housing Stability Vital Sign     Unable to Pay for Housing in the Last Year: No     Number of Times Moved in the Last Year: 0     Homeless in the Last Year: No     Past Medical History:   Diagnosis Date    Abnormal result of other cardiovascular function study     Abnormal nuclear stress test    Acute ischemic heart disease, unspecified     ACS (acute coronary syndrome)    Atherosclerotic heart disease of native  coronary artery without angina pectoris     Arteriosclerotic coronary artery disease    Chronic sinusitis, unspecified     Chronic congestion of paranasal sinus    Congenital heart disease     Coronary angioplasty status     History of percutaneous coronary intervention    Encounter for preprocedural cardiovascular examination     Pre-operative cardiovascular examination    Encounter for screening for malignant neoplasm of prostate     Screening PSA (prostate specific antigen)    Heart disease     Hypertension     Lyme disease, unspecified 12/02/2013    Lyme disease    Other cervical disc displacement, unspecified cervical region     Herniated cervical disc without myelopathy    Other specified postprocedural states     History of carpal tunnel surgery    Other systemic sclerosis     Cutaneous scleroderma    Personal history of malignant neoplasm, unspecified     History of malignant neoplasm    Personal history of other diseases of the circulatory system     History of hypertension    Personal history of other diseases of the circulatory system     History of abnormal electrocardiography    Personal history of other diseases of the digestive system     History of gastroesophageal reflux (GERD)    Personal history of other diseases of the musculoskeletal system and connective tissue     History of low back pain    Personal history of other diseases of the musculoskeletal system and connective tissue     History of spinal stenosis    Personal history of other diseases of the musculoskeletal system and connective tissue     History of arthritis    Personal history of other endocrine, nutritional and metabolic disease     History of hypothyroidism    Personal history of other endocrine, nutritional and metabolic disease     History of hyperlipidemia    Personal history of other endocrine, nutritional and metabolic disease     History of type 2 diabetes mellitus    Personal history of other endocrine, nutritional and  "metabolic disease     History of hypoglycemia    Personal history of other specified conditions     History of chest pain    Polyp of stomach and duodenum     Gastric polyp    Type 2 diabetes mellitus      Past Surgical History:   Procedure Laterality Date    COLONOSCOPY      CORONARY ANGIOPLASTY WITH STENT PLACEMENT  07/27/2018    Cath Placement Of Stent 1    HAND SURGERY  07/27/2018    Hand Surgery                                                                                                                                                          OTHER SURGICAL HISTORY  07/27/2018    Surgery    OTHER SURGICAL HISTORY  07/27/2018    Total Hip Replacement Left       Charting was completed using voice recognition technology and may include unintended errors.     Subjective   Patient ID: Curtis Brown is a 84 y.o. male who presents for the following           Visit Vitals  /68 (BP Location: Right arm, Patient Position: Sitting, BP Cuff Size: Adult)   Pulse 59   Ht 1.727 m (5' 8\")   Wt 84.8 kg (187 lb)   SpO2 95%   BMI 28.43 kg/m²   Smoking Status Never   BSA 2.02 m²     PHYSICAL EXAM       REVIEW OF SYSTEMS       Allergies   Allergen Reactions    Gabapentin Dizziness    Morphine Dizziness and Nausea/vomiting    Primidone Nausea/vomiting    Penicillins Hives and Rash    Valacyclovir Rash       Current Outpatient Medications   Medication Sig Dispense Refill    Accu-Chek Pippa Plus test strp strip USE TO TEST ONCE DAILY.      Accu-Chek Fastclix Lancet Drum misc 1 Lancet early in the morning..      amLODIPine (Norvasc) 10 mg tablet TAKE 1 TABLET BY MOUTH ONCE  DAILY 90 tablet 3    aspirin 81 mg chewable tablet Chew 1 tablet (81 mg) once daily. Ok to restart 2/7/25      atorvastatin (Lipitor) 40 mg tablet Take 1 tablet (40 mg) by mouth once daily.      bisacodyl (Dulcolax) 5 mg EC tablet Take 1 tablet (5 mg) by mouth once daily as needed for constipation for up to 10 doses. Do not crush, chew, or split. 10 " tablet 0    coenzyme Q-10 300 mg capsule capsule Take 1 capsule (300 mg) by mouth once daily.      ergocalciferol (Vitamin D-2) 1250 mcg (50,000 units) capsule Take 1 capsule (1.25 mg) by mouth 1 (one) time per week.      famotidine (Pepcid) 40 mg tablet TAKE 1 TABLET BY MOUTH TWICE  DAILY 180 tablet 3    fenofibrate (Tricor) 48 mg tablet Take 1 tablet (48 mg) by mouth once daily. 90 tablet 3    finasteride (Proscar) 5 mg tablet Take 1 tablet (5 mg) by mouth once daily. (Patient taking differently: Take 1.25 mg by mouth once daily.) 90 tablet 3    glimepiride (Amaryl) 1 mg tablet Take 1 tablet (1 mg) by mouth once daily in the evening.  Take before meals.      glimepiride (Amaryl) 4 mg tablet Take 1 tablet (4 mg) by mouth once daily in the morning. Take before meals.      hydroCHLOROthiazide (HYDRODiuril) 25 mg tablet Take 1 tablet (25 mg) by mouth once daily. 90 tablet 3    insulin lispro (HUMALOG KWIKPEN INSULIN SUBQ) Inject under the skin 3 times a day as needed. Sliding Scale:  150-200= 2 units  201-250= 4 units  251-300= 6 units      lactobacillus acidophilus capsule Take 1 capsule by mouth once daily. With breakfast      levothyroxine (Synthroid, Levoxyl) 50 mcg tablet Take 1 tablet (50 mcg) by mouth 5 times a week. Omit Wednesday & Sunday      levothyroxine (Synthroid, Levoxyl) 50 mcg tablet Take 2 tablets (100 mcg) by mouth 2 times a week. Take on an empty stomach at the same time each day, either 30 to 60 minutes prior to breakfast- Give on Wednesday & Sunday      Livalo 4 mg tablet Take 4 mg by mouth once daily in the evening.  Take before meals.      losartan (Cozaar) 50 mg tablet Take 1 tablet (50 mg) by mouth once daily.      metoprolol tartrate (Lopressor) 25 mg tablet TAKE 1 TABLET BY MOUTH TWICE  DAILY 180 tablet 3    multivitamin tablet Take 1 tablet by mouth once daily.      nitroglycerin (Nitrostat) 0.4 mg SL tablet Place 1 tablet (0.4 mg) under the tongue every 5 minutes if needed for chest pain.  Under the tongue as needed for chest pain 25 tablet 2    omega-3 acid ethyl esters (Lovaza) 1 gram capsule       potassium chloride CR (K-Tab) 20 mEq ER tablet Take 1 tablet (20 mEq) by mouth 2 times a day.      psyllium (Metamucil) powder Take by mouth once daily. 2 teaspoonfuls      vit C/E/Zn/coppr/lutein/zeaxan (PRESERVISION AREDS-2 ORAL) Take 1 tablet by mouth 2 times a day.      vit C/zinc citrate/elderberry (SAMBUCUS ELDERBERRY ORAL) Take 1 capsule by mouth once daily.       No current facility-administered medications for this visit.       Objective     Hospital Outpatient Visit on 05/07/2025   Component Date Value Ref Range Status    AV pk gabriela 05/07/2025 4.37  m/s Final    AV mn grad 05/07/2025 39  mmHg Final    LVOT diam 05/07/2025 2.24  cm Final    LA vol index A/L 05/07/2025 28.2  ml/m2 Final    Tricuspid annular plane systolic e* 05/07/2025 2.1  cm Final    LV EF 05/07/2025 58  % Final    RV free wall pk S' 05/07/2025 0.13  cm/s Final    LVIDd 05/07/2025 5.16  cm Final    RVSP 05/07/2025 27.2  mmHg Final    Aortic Valve Area by Continuity of* 05/07/2025 0.74  cm2 Final    Aortic Valve Area by Continuity of* 05/07/2025 0.67  cm2 Final    AV pk grad 05/07/2025 77  mmHg Final    LV A4C EF 05/07/2025 54.4   Final    BSA 05/07/2025 2.02  m2 Final   Office Visit on 05/07/2025   Component Date Value Ref Range Status    Ventricular Rate 05/07/2025 73  BPM Final    Atrial Rate 05/07/2025 73  BPM Final    CA Interval 05/07/2025 168  ms Final    QRS Duration 05/07/2025 142  ms Final    QT Interval 05/07/2025 430  ms Final    QTC Calculation(Bazett) 05/07/2025 473  ms Final    P Axis 05/07/2025 -17  degrees Final    R Axis 05/07/2025 -14  degrees Final    T Axis 05/07/2025 -86  degrees Final    QRS Count 05/07/2025 12  beats Final    Q Onset 05/07/2025 207  ms Final    P Onset 05/07/2025 123  ms Final    P Offset 05/07/2025 189  ms Final    T Offset 05/07/2025 422  ms Final    QTC Fredericia 05/07/2025 459  ms  Final   Orders Only on 03/26/2025   Component Date Value Ref Range Status    [facility] GFR AA 03/26/2025 >60   Final    [facility] Bilirubin, Total 03/26/2025 0.70  0.30 - 1.20 mg/dL Final    [facility] Chloride 03/26/2025 100  98 - 107 mmol/L Final    [facility] A/G Ratio 03/26/2025 1.3   Final    [facility] BUN/Creat Ratio 03/26/2025 13.8   Final    [facility] Na 03/26/2025 138  135 - 145 mmol/L Final    [facility] GPT 03/26/2025 29  10 - 49 unit/L Final    [facility] Alk Phos 03/26/2025 41 (L)  45 - 117 unit/L Final    [facility] Creatinine 03/26/2025 1.3 (H)  0.6 - 1.1 mg/dL Final    [facility] Total Protein 03/26/2025 7.3  5.7 - 8.2 g/dL Final    [facility] CO2, venous 03/26/2025 27.0  20.0 - 31.0 mmol/L Final    [facility] Glomerular Filtration R* 03/26/2025 53  mL/min/1.73m? Final    [facility] Calculated Osmolality 03/26/2025 282  275 - 295 mOsm/kg Final    [facility] K 03/26/2025 3.8  3.5 - 5.1 mmol/L Final    [facility] Globulin 03/26/2025 3.2  g/dL Final    [facility] BUN 03/26/2025 18  9 - 23 mg/dL Final    [facility] Calcium 03/26/2025 10.3  8.7 - 10.4 mg/dL Final    [facility] GOT 03/26/2025 25  15 - 37 unit/L Final    [facility] ALB 03/26/2025 4.1  3.4 - 5.0 g/dL Final    [facility] Glucose 03/26/2025 182 (H)  74 - 106 mg/dL Final    [facility] TSH 03/26/2025 3.14  0.55 - 4.78 uIU/ml Final    [facility] Direct LDL 03/26/2025 78  mg/dL Final    [facility] Vit D 25 03/26/2025 35  ng/mL Final    [facility] Triglycerides 03/26/2025 411 (H)  30 - 150 mg/dL Final    [facility] Cholesterol 03/26/2025 170  100 - 200 mg/dL Final    [facility] Calculated LDL Choleste* 03/26/2025 NCAL  60 - 130 mg/dL Final    [facility] HDL Cholesterol 03/26/2025 37 (L)  40 - 60 mg/dL Final    [facility] Total Chol/HDL Chol Rat* 03/26/2025 4.6   Final    [facility] HGB A1C 03/26/2025 6.2  % Final   Admission on 01/30/2025, Discharged on 01/31/2025   Component Date Value Ref Range Status    POCT Glucose 01/30/2025 153 (H)  74 -  99 mg/dL Final    POCT Glucose 01/30/2025 172 (H)  74 - 99 mg/dL Final    POCT Glucose 01/30/2025 194 (H)  74 - 99 mg/dL Final    WBC 01/31/2025 13.5 (H)  4.4 - 11.3 x10*3/uL Final    nRBC 01/31/2025 0.0  0.0 - 0.0 /100 WBCs Final    RBC 01/31/2025 3.94 (L)  4.50 - 5.90 x10*6/uL Final    Hemoglobin 01/31/2025 12.3 (L)  13.5 - 17.5 g/dL Final    Hematocrit 01/31/2025 39.4 (L)  41.0 - 52.0 % Final    MCV 01/31/2025 100  80 - 100 fL Final    MCH 01/31/2025 31.2  26.0 - 34.0 pg Final    MCHC 01/31/2025 31.2 (L)  32.0 - 36.0 g/dL Final    RDW 01/31/2025 13.4  11.5 - 14.5 % Final    Platelets 01/31/2025 189  150 - 450 x10*3/uL Final    Glucose 01/31/2025 117 (H)  74 - 99 mg/dL Final    Sodium 01/31/2025 139  136 - 145 mmol/L Final    Potassium 01/31/2025 3.8  3.5 - 5.3 mmol/L Final    Chloride 01/31/2025 101  98 - 107 mmol/L Final    Bicarbonate 01/31/2025 28  21 - 32 mmol/L Final    Anion Gap 01/31/2025 14  10 - 20 mmol/L Final    Urea Nitrogen 01/31/2025 20  6 - 23 mg/dL Final    Creatinine 01/31/2025 1.11  0.50 - 1.30 mg/dL Final    eGFR 01/31/2025 66  >60 mL/min/1.73m*2 Final    Calcium 01/31/2025 9.4  8.6 - 10.3 mg/dL Final    POCT Glucose 01/30/2025 347 (H)  74 - 99 mg/dL Final    POCT Glucose 01/31/2025 132 (H)  74 - 99 mg/dL Final    POCT Glucose 01/31/2025 164 (H)  74 - 99 mg/dL Final   Lab on 01/21/2025   Component Date Value Ref Range Status    Glucose 01/21/2025 116 (H)  74 - 99 mg/dL Final    Sodium 01/21/2025 138  136 - 145 mmol/L Final    Potassium 01/21/2025 3.9  3.5 - 5.3 mmol/L Final    Chloride 01/21/2025 101  98 - 107 mmol/L Final    Bicarbonate 01/21/2025 26  21 - 32 mmol/L Final    Anion Gap 01/21/2025 15  10 - 20 mmol/L Final    Urea Nitrogen 01/21/2025 25 (H)  6 - 23 mg/dL Final    Creatinine 01/21/2025 1.25  0.50 - 1.30 mg/dL Final    eGFR 01/21/2025 57 (L)  >60 mL/min/1.73m*2 Final    Calcium 01/21/2025 10.1  8.6 - 10.3 mg/dL Final    Prealbumin 01/21/2025 37.9  18.0 - 40.0 mg/dL Final    WBC  01/21/2025 9.2  4.4 - 11.3 x10*3/uL Final    nRBC 01/21/2025 0.0  0.0 - 0.0 /100 WBCs Final    RBC 01/21/2025 4.58  4.50 - 5.90 x10*6/uL Final    Hemoglobin 01/21/2025 14.6  13.5 - 17.5 g/dL Final    Hematocrit 01/21/2025 44.6  41.0 - 52.0 % Final    MCV 01/21/2025 97  80 - 100 fL Final    MCH 01/21/2025 31.9  26.0 - 34.0 pg Final    MCHC 01/21/2025 32.7  32.0 - 36.0 g/dL Final    RDW 01/21/2025 13.6  11.5 - 14.5 % Final    Platelets 01/21/2025 198  150 - 450 x10*3/uL Final    Protime 01/21/2025 10.8  9.8 - 12.8 seconds Final    INR 01/21/2025 1.0  0.9 - 1.1 Final    aPTT 01/21/2025 38  27 - 38 seconds Final    ABO TYPE 01/21/2025 A   Final    Rh TYPE 01/21/2025 POS   Final    ANTIBODY SCREEN 01/21/2025 NEG   Final    Hemoglobin A1C 01/21/2025 6.0 (H)  See comment % Final    Estimated Average Glucose 01/21/2025 126  Not Established mg/dL Final   Pre-Admission Testing on 01/21/2025   Component Date Value Ref Range Status    Staph/MRSA Screen Culture 01/21/2025 No Staphylococcus aureus isolated   Final    Ventricular Rate 01/21/2025 75  BPM Final    Atrial Rate 01/21/2025 75  BPM Final    ND Interval 01/21/2025 166  ms Final    QRS Duration 01/21/2025 136  ms Final    QT Interval 01/21/2025 484  ms Final    QTC Calculation(Bazett) 01/21/2025 540  ms Final    R Axis 01/21/2025 -23  degrees Final    T Axis 01/21/2025 -8  degrees Final    QRS Count 01/21/2025 13  beats Final    Q Onset 01/21/2025 210  ms Final    P Onset 01/21/2025 127  ms Final    P Offset 01/21/2025 182  ms Final    T Offset 01/21/2025 452  ms Final    QTC Fredericia 01/21/2025 521  ms Final       Radiology: Reviewed imaging in powerchart.  No results found.    Family History   Problem Relation Name Age of Onset    Stroke Mother      Heart failure Mother      Other (hepatic cirrhosis) Father      Parkinsonism Brother       Social History     Socioeconomic History    Marital status:    Tobacco Use    Smoking status: Never    Smokeless tobacco:  Never   Vaping Use    Vaping status: Never Used   Substance and Sexual Activity    Alcohol use: Never    Drug use: Never     Social Drivers of Health     Financial Resource Strain: Low Risk  (1/30/2025)    Overall Financial Resource Strain (CARDIA)     Difficulty of Paying Living Expenses: Not hard at all   Food Insecurity: No Food Insecurity (1/30/2025)    Hunger Vital Sign     Worried About Running Out of Food in the Last Year: Never true     Ran Out of Food in the Last Year: Never true   Transportation Needs: No Transportation Needs (1/30/2025)    PRAPARE - Transportation     Lack of Transportation (Medical): No     Lack of Transportation (Non-Medical): No   Intimate Partner Violence: Not At Risk (1/30/2025)    Humiliation, Afraid, Rape, and Kick questionnaire     Fear of Current or Ex-Partner: No     Emotionally Abused: No     Physically Abused: No     Sexually Abused: No   Housing Stability: Low Risk  (1/30/2025)    Housing Stability Vital Sign     Unable to Pay for Housing in the Last Year: No     Number of Times Moved in the Last Year: 0     Homeless in the Last Year: No     Past Medical History:   Diagnosis Date    Abnormal result of other cardiovascular function study     Abnormal nuclear stress test    Acute ischemic heart disease, unspecified     ACS (acute coronary syndrome)    Atherosclerotic heart disease of native coronary artery without angina pectoris     Arteriosclerotic coronary artery disease    Chronic sinusitis, unspecified     Chronic congestion of paranasal sinus    Congenital heart disease     Coronary angioplasty status     History of percutaneous coronary intervention    Encounter for preprocedural cardiovascular examination     Pre-operative cardiovascular examination    Encounter for screening for malignant neoplasm of prostate     Screening PSA (prostate specific antigen)    Heart disease     Hypertension     Lyme disease, unspecified 12/02/2013    Lyme disease    Other cervical disc  displacement, unspecified cervical region     Herniated cervical disc without myelopathy    Other specified postprocedural states     History of carpal tunnel surgery    Other systemic sclerosis     Cutaneous scleroderma    Personal history of malignant neoplasm, unspecified     History of malignant neoplasm    Personal history of other diseases of the circulatory system     History of hypertension    Personal history of other diseases of the circulatory system     History of abnormal electrocardiography    Personal history of other diseases of the digestive system     History of gastroesophageal reflux (GERD)    Personal history of other diseases of the musculoskeletal system and connective tissue     History of low back pain    Personal history of other diseases of the musculoskeletal system and connective tissue     History of spinal stenosis    Personal history of other diseases of the musculoskeletal system and connective tissue     History of arthritis    Personal history of other endocrine, nutritional and metabolic disease     History of hypothyroidism    Personal history of other endocrine, nutritional and metabolic disease     History of hyperlipidemia    Personal history of other endocrine, nutritional and metabolic disease     History of type 2 diabetes mellitus    Personal history of other endocrine, nutritional and metabolic disease     History of hypoglycemia    Personal history of other specified conditions     History of chest pain    Polyp of stomach and duodenum     Gastric polyp    Type 2 diabetes mellitus      Past Surgical History:   Procedure Laterality Date    COLONOSCOPY      CORONARY ANGIOPLASTY WITH STENT PLACEMENT  07/27/2018    Cath Placement Of Stent 1    HAND SURGERY  07/27/2018    Hand Surgery                                                                                                                                                          OTHER SURGICAL HISTORY  07/27/2018     Surgery    OTHER SURGICAL HISTORY  07/27/2018    Total Hip Replacement Left       Charting was completed using voice recognition technology and may include unintended errors.

## 2025-05-28 LAB
NON-UH HIE A/G RATIO: 1.4
NON-UH HIE ALB: 4.3 G/DL (ref 3.4–5)
NON-UH HIE ALK PHOS: 39 UNIT/L (ref 45–117)
NON-UH HIE BILIRUBIN, TOTAL: 0.8 MG/DL (ref 0.3–1.2)
NON-UH HIE BUN/CREAT RATIO: 16.2
NON-UH HIE BUN: 21 MG/DL (ref 9–23)
NON-UH HIE CALCIUM: 10.4 MG/DL (ref 8.7–10.4)
NON-UH HIE CALCULATED OSMOLALITY: 284 MOSM/KG (ref 275–295)
NON-UH HIE CHLORIDE: 101 MMOL/L (ref 98–107)
NON-UH HIE CO2, VENOUS: 29 MMOL/L (ref 20–31)
NON-UH HIE CREATININE: 1.3 MG/DL (ref 0.6–1.1)
NON-UH HIE GFR AA: >60
NON-UH HIE GLOBULIN: 3.1 G/DL
NON-UH HIE GLOMERULAR FILTRATION RATE: 53 ML/MIN/1.73M?
NON-UH HIE GLUCOSE: 157 MG/DL (ref 74–106)
NON-UH HIE GOT: 29 UNIT/L (ref 15–37)
NON-UH HIE GPT: 33 UNIT/L (ref 10–49)
NON-UH HIE K: 3.5 MMOL/L (ref 3.5–5.1)
NON-UH HIE NA: 139 MMOL/L (ref 135–145)
NON-UH HIE TOTAL PROTEIN: 7.4 G/DL (ref 5.7–8.2)

## 2025-06-30 ENCOUNTER — OFFICE VISIT (OUTPATIENT)
Facility: CLINIC | Age: 85
End: 2025-06-30
Payer: MEDICARE

## 2025-06-30 VITALS
TEMPERATURE: 97.5 F | DIASTOLIC BLOOD PRESSURE: 72 MMHG | HEART RATE: 69 BPM | WEIGHT: 185 LBS | HEIGHT: 68 IN | SYSTOLIC BLOOD PRESSURE: 122 MMHG | BODY MASS INDEX: 28.04 KG/M2

## 2025-06-30 DIAGNOSIS — M54.16 LUMBAR RADICULOPATHY: Primary | ICD-10-CM

## 2025-06-30 PROCEDURE — 3078F DIAST BP <80 MM HG: CPT | Performed by: STUDENT IN AN ORGANIZED HEALTH CARE EDUCATION/TRAINING PROGRAM

## 2025-06-30 PROCEDURE — 99215 OFFICE O/P EST HI 40 MIN: CPT | Performed by: STUDENT IN AN ORGANIZED HEALTH CARE EDUCATION/TRAINING PROGRAM

## 2025-06-30 PROCEDURE — 1036F TOBACCO NON-USER: CPT | Performed by: STUDENT IN AN ORGANIZED HEALTH CARE EDUCATION/TRAINING PROGRAM

## 2025-06-30 PROCEDURE — 3074F SYST BP LT 130 MM HG: CPT | Performed by: STUDENT IN AN ORGANIZED HEALTH CARE EDUCATION/TRAINING PROGRAM

## 2025-06-30 PROCEDURE — 1125F AMNT PAIN NOTED PAIN PRSNT: CPT | Performed by: STUDENT IN AN ORGANIZED HEALTH CARE EDUCATION/TRAINING PROGRAM

## 2025-06-30 PROCEDURE — 1159F MED LIST DOCD IN RCRD: CPT | Performed by: STUDENT IN AN ORGANIZED HEALTH CARE EDUCATION/TRAINING PROGRAM

## 2025-06-30 RX ORDER — KETOROLAC TROMETHAMINE 5 MG/ML
SOLUTION OPHTHALMIC
COMMUNITY
Start: 2025-06-15

## 2025-06-30 RX ORDER — BRIMONIDINE TARTRATE 2 MG/ML
1 SOLUTION/ DROPS OPHTHALMIC 2 TIMES DAILY
COMMUNITY
Start: 2025-06-19

## 2025-06-30 RX ORDER — PREGABALIN 75 MG/1
75 CAPSULE ORAL 2 TIMES DAILY
Qty: 180 CAPSULE | Refills: 0 | Status: SHIPPED | OUTPATIENT
Start: 2025-06-30 | End: 2025-09-28

## 2025-06-30 RX ORDER — PREDNISOLONE ACETATE 10 MG/ML
SUSPENSION/ DROPS OPHTHALMIC
COMMUNITY
Start: 2025-06-15

## 2025-06-30 ASSESSMENT — PAIN SCALES - GENERAL: PAINLEVEL_OUTOF10: 10-WORST PAIN EVER

## 2025-06-30 NOTE — PROGRESS NOTES
[facility] [place]  Department of Neurological Surgery  Established Patient Visit    History of Present Illness:  Curtis Brown is a 84 y.o. year old male who presents to the spine clinic in follow up with 5 months s/p L4-S1 decompression on 1/30/25. Post operatively, he was doing okay and felt that he had good days and bad days. On bad days, he has return of symptoms with lumbar radiculopathy in the L5 distribution in back of legs, the pain can get up to an 8/10. Today in office, he reports that he has been worsening. He has low back pain radiating to the right leg. He denies any symptoms in the left leg. He has attempted conservative care with medications, physical therapy, but nothing has helped.    Patient's BMI is Body mass index is 28.13 kg/m².    Diabetic: prediabetes  Diabetes Composite Score: 4   Values used to calculate this score:    Points  Metrics       1        Blood Pressure: 122/72       1        Prescribed Statins: N/A - patient does not meet statin therapy criteria       1        Hemoglobin A1c: 6.2%       1        Smokes Tobacco: No       0        Prescribed Aspirin: No      Osteoporosis: no  No DXA results found for the past 12 months    Review of Systems:  14/14 systems reviewed and negative other than what is listed in the history of present illness    Problem List[1]  Medical History[2]  Surgical History[3]  Social History     Tobacco Use    Smoking status: Never    Smokeless tobacco: Never   Substance Use Topics    Alcohol use: Never     family history includes Heart failure in his mother; Parkinsonism in his brother; Stroke in his mother; hepatic cirrhosis in his father.  Current Medications[4]  Allergies[5]    Physical Examination:    General: Well developed, awake/alert/oriented x3, no distress, alert and cooperative  Skin: Warm and dry, no lesions, no rashes  ENMT: Mucous membranes moist, no apparent injury, no lesions seen  Head/Neck: Neck Supple, no apparent  injury  Respiratory/Thorax: Normal breath sounds with good chest expansion, thorax symmetric  Cardiovascular: No pitting edema, no JVD    Motor Strength: 5/5 Throughout all extremities    Muscle Bulk: Normal and symmetric in all extremities    Posture:   -- Cervical: Normal  -- Thoracic: Normal  -- Lumbar : Normal  Paraspinal muscle spasm/tenderness absent.     Sensation: intact to light touch    low back pain radiating to the right leg    Results:  I personally reviewed and interpreted the imaging results which included MRI L spine showing a right facet synovial cyst and severe narrowing between L4-S1.    Assessment and Plan:      Curtis Brown is a 84 y.o. year old male who presents to the spine clinic in follow up with 5 months s/p L4-S1 decompression on 1/30/25. Post operatively, he was doing okay and felt that he had good days and bad days. On bad days, he has return of symptoms with lumbar radiculopathy in the L5 distribution in back of legs, the pain can get up to an 8/10. Today in office, he reports that he has been worsening. He has low back pain radiating to the right leg. He denies any symptoms in the left leg. He has attempted conservative care with medications, physical therapy, but nothing has helped.    At this point he is in significant pain. I discussed the risks and benefits of surgery with him in detail. This would be a revision surgery unfortunately. He has a large R synovial cyst and facet effusions at both levels of his prior laminectomy.      I have reviewed imaging and diagnosis with the patient, discussed the natural history of their disease and both non-operative and operative treatments available and rationale vs risks for both.    The patient’s clinical symptoms correlates well with the radiological findings. Patient has been having significant functional impairment with decreased ability to perform her normal activities of daily living. They have tried treatment options including  medications (NSAIDs/narcotics/muscle relaxants/membrane stabilizers), formal physical therapy, and injections.    I offered the option of surgery that would consist of a L4-5, L5-S1 minimally invasive transforaminal lumbar interbody fusion.    While there is no evidence of instability in the form of pars defect or spondylolisthesis or prior discectomies; because of the presence of foraminal and extraforaminal severe stenosis, extensive decompression with removal of almost the entire facet in the form of complete facetectomy /resection of pars interarticularis or more than 75% of the facet will have to be performed at the operative levels that will result in destabilization of the spine/intraoperative spinal instability and hence would require concomitant stabilization by placement of pedicle screws. I believe that not performing a fusion and instrumentation following the extensive decompression will be a suboptimal treatment and leave the spine destabilized with potential worsening of her symptoms and development of spinal deformity that may require a much bigger revision surgery that currently planned.    I have explained the surgical procedure in detail with expected duration and extent of recovery along risks of surgery that include, but is not limited to bleeding, infection, blood vessel injury or damage, loss of sensation, loss of bladder, bowel or sexual function, nerve injury/damage resulting in weakness/paralysis, malunion, nonunion, CSF leak, brachial plexus injury, peripheral vision blindness, failure of implants/fusion, failure to relieve symptoms, recurrent disease, adjacent segment disease, need to reoperate for any reason and general anesthesia reaction such as stroke, coma, heart attack, delirium, confusion, death as well as worsening of preexisted medical conditions.    All questions were answered and the patient left satisfied with the surgical plan moving forward.      I have reviewed all prior  documentation and reviewed the electronic medical record since admission. I have personally have reviewed all advanced imaging not just the reports and used my interpretation as documented as the relevant findings. I have reviewed the risks and benefits of all treatment recommendations listed in this note with the patient and family.       The above clinical summary has been dictated with voice recognition software. It has not been proofread for grammatical errors, typographical mistakes, or other semantic inconsistencies.    Thank you for visiting our office today. It was our pleasure to take part in your healthcare.     Do not hesitate to call with any questions regarding your plan of care after leaving at [phone] M-F 8am-4pm.     To clinicians, thank you very much for this kind referral. It is a privilege to partner with you in the care of your patients. My office would be delighted to assist you with any further consultations or with questions regarding the plan of care outlined. Do not hesitate to call the office or contact me directly.       Sincerely,      Zia Church MD, [facility]  Spine , [facility]  Micheal Theodore Chair in Spinal Neurosurgery  Complex Spine Surgery Fellowship Director   of Neurological Surgery  [facility] [employer]  Phone: [phone]  Fax: [phone]        Scribe Attestation  By signing my name below, I, Terri Gertrude , Jaswant   attest that this documentation has been prepared under the direction and in the presence of Zia Church MD.           [1]   Patient Active Problem List  Diagnosis    Achylic anemia    Anxiety disorder    AS (aortic stenosis)    Athscl heart disease of native coronary artery w/o ang pctrs    Colitis    Type 2 diabetes mellitus with other specified complication, without long-term current use of insulin    Hypertriglyceridemia     Hypothyroidism    Pericardial effusion (HHS-HCC)    Pericarditis (HHS-HCC)    Presence of stent in coronary artery    Pure hypercholesterolemia    Hypertension    PVD (peripheral vascular disease)    Left flank pain    Acid reflux    Gastroesophageal reflux disease without esophagitis    Benign neoplasm of skin of right upper eyelid    DISH (diffuse idiopathic skeletal hyperostosis)    Gastric polyp    Onychomycosis    Primary osteoarthritis involving multiple joints    Osteoarthritis of hip    Lymphocytic colitis    Elevated prostate specific antigen less than 10 ng/ml    Wellness examination    Acute pyelonephritis    ALEXIA (acute kidney injury)    Anemia    Diffuse systemic sclerosis (Multi)    Resting tremor    Diarrhea    Lyme disease    RBBB    Spinal stenosis    Renal mass, left    Lumbosacral plexus lesion    Acute abdominal pain    Lumbar radiculopathy    Neurogenic claudication    Lumbar radiculopathy, chronic    Type 2 diabetes mellitus with diabetic polyneuropathy, without long-term current use of insulin   [2]   Past Medical History:  Diagnosis Date    Abnormal result of other cardiovascular function study     Abnormal nuclear stress test    Acute ischemic heart disease, unspecified     ACS (acute coronary syndrome)    Atherosclerotic heart disease of native coronary artery without angina pectoris     Arteriosclerotic coronary artery disease    Chronic sinusitis, unspecified     Chronic congestion of paranasal sinus    Congenital heart disease     Coronary angioplasty status     History of percutaneous coronary intervention    Encounter for preprocedural cardiovascular examination     Pre-operative cardiovascular examination    Encounter for screening for malignant neoplasm of prostate     Screening PSA (prostate specific antigen)    Heart disease     Hypertension     Lyme disease, unspecified 12/02/2013    Lyme disease    Other cervical disc displacement, unspecified cervical region     Herniated  cervical disc without myelopathy    Other specified postprocedural states     History of carpal tunnel surgery    Other systemic sclerosis     Cutaneous scleroderma    Personal history of malignant neoplasm, unspecified     History of malignant neoplasm    Personal history of other diseases of the circulatory system     History of hypertension    Personal history of other diseases of the circulatory system     History of abnormal electrocardiography    Personal history of other diseases of the digestive system     History of gastroesophageal reflux (GERD)    Personal history of other diseases of the musculoskeletal system and connective tissue     History of low back pain    Personal history of other diseases of the musculoskeletal system and connective tissue     History of spinal stenosis    Personal history of other diseases of the musculoskeletal system and connective tissue     History of arthritis    Personal history of other endocrine, nutritional and metabolic disease     History of hypothyroidism    Personal history of other endocrine, nutritional and metabolic disease     History of hyperlipidemia    Personal history of other endocrine, nutritional and metabolic disease     History of type 2 diabetes mellitus    Personal history of other endocrine, nutritional and metabolic disease     History of hypoglycemia    Personal history of other specified conditions     History of chest pain    Polyp of stomach and duodenum     Gastric polyp    Type 2 diabetes mellitus    [3]   Past Surgical History:  Procedure Laterality Date    COLONOSCOPY      CORONARY ANGIOPLASTY WITH STENT PLACEMENT  07/27/2018    Cath Placement Of Stent 1    HAND SURGERY  07/27/2018    Hand Surgery                                                                                                                                                          OTHER SURGICAL HISTORY  07/27/2018    Surgery    OTHER SURGICAL HISTORY  07/27/2018    Total  Hip Replacement Left   [4]   Current Outpatient Medications:     Accu-Chek Pippa Plus test strp strip, USE TO TEST ONCE DAILY., Disp: , Rfl:     Accu-Chek Fastclix Lancet Drum misc, 1 Lancet early in the morning.., Disp: , Rfl:     amLODIPine (Norvasc) 10 mg tablet, TAKE 1 TABLET BY MOUTH ONCE  DAILY, Disp: 90 tablet, Rfl: 3    aspirin 81 mg chewable tablet, Chew 1 tablet (81 mg) once daily. Ok to restart 2/7/25, Disp: , Rfl:     atorvastatin (Lipitor) 40 mg tablet, Take 1 tablet (40 mg) by mouth once daily., Disp: , Rfl:     brimonidine (AlphaGAN) 0.2 % ophthalmic solution, Administer 1 drop into affected eye(s) twice a day., Disp: , Rfl:     coenzyme Q-10 300 mg capsule capsule, Take 1 capsule (300 mg) by mouth once daily., Disp: , Rfl:     ergocalciferol (Vitamin D-2) 1250 mcg (50,000 units) capsule, Take 1 capsule (1.25 mg) by mouth 1 (one) time per week., Disp: , Rfl:     famotidine (Pepcid) 40 mg tablet, TAKE 1 TABLET BY MOUTH TWICE  DAILY, Disp: 180 tablet, Rfl: 3    fenofibrate (Tricor) 48 mg tablet, Take 1 tablet (48 mg) by mouth once daily., Disp: 90 tablet, Rfl: 3    finasteride (Proscar) 5 mg tablet, Take 1 tablet (5 mg) by mouth once daily. (Patient taking differently: Take 1.25 mg by mouth once daily.), Disp: 90 tablet, Rfl: 3    glimepiride (Amaryl) 1 mg tablet, Take 1 tablet (1 mg) by mouth once daily in the evening.  Take before meals., Disp: , Rfl:     glimepiride (Amaryl) 4 mg tablet, Take 1 tablet (4 mg) by mouth once daily in the morning. Take before meals., Disp: , Rfl:     hydroCHLOROthiazide (HYDRODiuril) 25 mg tablet, Take 1 tablet (25 mg) by mouth once daily., Disp: 90 tablet, Rfl: 3    insulin lispro (HUMALOG KWIKPEN INSULIN SUBQ), Inject under the skin 3 times a day as needed. Sliding Scale: 150-200= 2 units 201-250= 4 units 251-300= 6 units, Disp: , Rfl:     ketorolac (Acular) 0.5 % ophthalmic solution, INSTILL 1 DROP INTO THE RIGHT EYE FOUR TIMES DAILY FOR 21 DAYS, Disp: , Rfl:      lactobacillus acidophilus capsule, Take 1 capsule by mouth once daily. With breakfast, Disp: , Rfl:     levothyroxine (Synthroid, Levoxyl) 50 mcg tablet, Take 1 tablet (50 mcg) by mouth 5 times a week. Omit Wednesday & Sunday, Disp: , Rfl:     levothyroxine (Synthroid, Levoxyl) 50 mcg tablet, Take 2 tablets (100 mcg) by mouth 2 times a week. Take on an empty stomach at the same time each day, either 30 to 60 minutes prior to breakfast- Give on Wednesday & Sunday, Disp: , Rfl:     Livalo 4 mg tablet, Take 4 mg by mouth once daily in the evening.  Take before meals., Disp: , Rfl:     losartan (Cozaar) 50 mg tablet, Take 1 tablet (50 mg) by mouth once daily., Disp: , Rfl:     metoprolol tartrate (Lopressor) 25 mg tablet, TAKE 1 TABLET BY MOUTH TWICE  DAILY, Disp: 180 tablet, Rfl: 3    multivitamin tablet, Take 1 tablet by mouth once daily., Disp: , Rfl:     nitroglycerin (Nitrostat) 0.4 mg SL tablet, Place 1 tablet (0.4 mg) under the tongue every 5 minutes if needed for chest pain. Under the tongue as needed for chest pain, Disp: 25 tablet, Rfl: 2    omega-3 acid ethyl esters (Lovaza) 1 gram capsule, , Disp: , Rfl:     potassium chloride CR (K-Tab) 20 mEq ER tablet, Take 1 tablet (20 mEq) by mouth 2 times a day., Disp: , Rfl:     prednisoLONE acetate (Pred-Forte) 1 % ophthalmic suspension, , Disp: , Rfl:     psyllium (Metamucil) powder, Take by mouth once daily. 2 teaspoonfuls, Disp: , Rfl:     vit C/E/Zn/coppr/lutein/zeaxan (PRESERVISION AREDS-2 ORAL), Take 1 tablet by mouth 2 times a day., Disp: , Rfl:     vit C/zinc citrate/elderberry (SAMBUCUS ELDERBERRY ORAL), Take 1 capsule by mouth once daily., Disp: , Rfl:     bisacodyl (Dulcolax) 5 mg EC tablet, Take 1 tablet (5 mg) by mouth once daily as needed for constipation for up to 10 doses. Do not crush, chew, or split. (Patient not taking: Reported on 6/30/2025), Disp: 10 tablet, Rfl: 0    pregabalin (Lyrica) 75 mg capsule, Take 1 capsule (75 mg) by mouth 2 times a  day., Disp: 180 capsule, Rfl: 0  [5]   Allergies  Allergen Reactions    Gabapentin Dizziness    Morphine Dizziness and Nausea/vomiting    Primidone Nausea/vomiting    Penicillins Hives and Rash    Valacyclovir Rash

## 2025-07-02 DIAGNOSIS — M54.16 LUMBAR RADICULOPATHY: Primary | ICD-10-CM

## 2025-07-02 DIAGNOSIS — R79.1 ABNORMAL COAGULATION PROFILE: ICD-10-CM

## 2025-07-02 RX ORDER — ACETAMINOPHEN 325 MG/1
975 TABLET ORAL ONCE
OUTPATIENT
Start: 2025-07-02 | End: 2025-07-02

## 2025-07-02 RX ORDER — TRANEXAMIC ACID 650 MG/1
1300 TABLET ORAL ONCE
OUTPATIENT
Start: 2025-07-02 | End: 2025-07-02

## 2025-07-02 RX ORDER — CELECOXIB 400 MG/1
400 CAPSULE ORAL ONCE
OUTPATIENT
Start: 2025-07-02 | End: 2025-07-02

## 2025-07-03 ENCOUNTER — OFFICE VISIT (OUTPATIENT)
Dept: CARDIOLOGY | Facility: CLINIC | Age: 85
End: 2025-07-03
Payer: MEDICARE

## 2025-07-03 VITALS
OXYGEN SATURATION: 96 % | DIASTOLIC BLOOD PRESSURE: 80 MMHG | SYSTOLIC BLOOD PRESSURE: 120 MMHG | HEART RATE: 73 BPM | WEIGHT: 191 LBS | HEIGHT: 68 IN | BODY MASS INDEX: 28.95 KG/M2

## 2025-07-03 DIAGNOSIS — I25.10 ATHSCL HEART DISEASE OF NATIVE CORONARY ARTERY W/O ANG PCTRS: ICD-10-CM

## 2025-07-03 DIAGNOSIS — I35.0 NONRHEUMATIC AORTIC VALVE STENOSIS: ICD-10-CM

## 2025-07-03 DIAGNOSIS — E03.9 HYPOTHYROIDISM, UNSPECIFIED TYPE: ICD-10-CM

## 2025-07-03 DIAGNOSIS — E11.42 TYPE 2 DIABETES MELLITUS WITH DIABETIC POLYNEUROPATHY, WITHOUT LONG-TERM CURRENT USE OF INSULIN: ICD-10-CM

## 2025-07-03 DIAGNOSIS — F41.9 ANXIETY DISORDER, UNSPECIFIED TYPE: ICD-10-CM

## 2025-07-03 DIAGNOSIS — M48.00 SPINAL STENOSIS, UNSPECIFIED SPINAL REGION: ICD-10-CM

## 2025-07-03 DIAGNOSIS — E78.00 PURE HYPERCHOLESTEROLEMIA: ICD-10-CM

## 2025-07-03 DIAGNOSIS — R29.818 NEUROGENIC CLAUDICATION: ICD-10-CM

## 2025-07-03 DIAGNOSIS — I73.9 PVD (PERIPHERAL VASCULAR DISEASE): ICD-10-CM

## 2025-07-03 DIAGNOSIS — Z95.5 PRESENCE OF STENT IN CORONARY ARTERY: ICD-10-CM

## 2025-07-03 DIAGNOSIS — E78.1 HYPERTRIGLYCERIDEMIA: ICD-10-CM

## 2025-07-03 DIAGNOSIS — Z01.818 PRE-OPERATIVE CLEARANCE: Primary | ICD-10-CM

## 2025-07-03 DIAGNOSIS — I31.39 PERICARDIAL EFFUSION (HHS-HCC): ICD-10-CM

## 2025-07-03 DIAGNOSIS — I10 PRIMARY HYPERTENSION: ICD-10-CM

## 2025-07-03 DIAGNOSIS — I45.10 RIGHT BUNDLE BRANCH BLOCK (RBBB): ICD-10-CM

## 2025-07-03 PROBLEM — I50.32 CHRONIC DIASTOLIC CHF (CONGESTIVE HEART FAILURE): Status: ACTIVE | Noted: 2023-01-29

## 2025-07-03 LAB
ATRIAL RATE: 71 BPM
P AXIS: -22 DEGREES
P OFFSET: 196 MS
P ONSET: 137 MS
PR INTERVAL: 172 MS
Q ONSET: 223 MS
QRS COUNT: 12 BEATS
QRS DURATION: 146 MS
QT INTERVAL: 422 MS
QTC CALCULATION(BAZETT): 458 MS
QTC FREDERICIA: 446 MS
R AXIS: -30 DEGREES
T AXIS: 21 DEGREES
T OFFSET: 434 MS
VENTRICULAR RATE: 71 BPM

## 2025-07-03 PROCEDURE — 93010 ELECTROCARDIOGRAM REPORT: CPT | Performed by: INTERNAL MEDICINE

## 2025-07-03 PROCEDURE — 3079F DIAST BP 80-89 MM HG: CPT | Performed by: INTERNAL MEDICINE

## 2025-07-03 PROCEDURE — 99214 OFFICE O/P EST MOD 30 MIN: CPT | Performed by: INTERNAL MEDICINE

## 2025-07-03 PROCEDURE — 1036F TOBACCO NON-USER: CPT | Performed by: INTERNAL MEDICINE

## 2025-07-03 PROCEDURE — 1160F RVW MEDS BY RX/DR IN RCRD: CPT | Performed by: INTERNAL MEDICINE

## 2025-07-03 PROCEDURE — 3074F SYST BP LT 130 MM HG: CPT | Performed by: INTERNAL MEDICINE

## 2025-07-03 PROCEDURE — 99212 OFFICE O/P EST SF 10 MIN: CPT | Mod: 25

## 2025-07-03 PROCEDURE — 93005 ELECTROCARDIOGRAM TRACING: CPT | Performed by: INTERNAL MEDICINE

## 2025-07-03 PROCEDURE — 1159F MED LIST DOCD IN RCRD: CPT | Performed by: INTERNAL MEDICINE

## 2025-07-03 NOTE — ASSESSMENT & PLAN NOTE
5/7/25 echocardiogram severe aortic stenosis with mild AI, moderate concentric LVH, LVEF = 55-60%, Tr with normal RVSP, mild MR

## 2025-07-03 NOTE — PROGRESS NOTES
"  Shahrzad Brown  is a 84 y.o. year old male who presents for preoperative evaluation for minimally invasive spinal surgery.  Notes severe back pain.  No chest pain, no dsyppnea, no palpitations, no edema.  He has a cyst on his back    Blood pressure 120/80, pulse 73, height 1.727 m (5' 8\"), weight 86.6 kg (191 lb), SpO2 96%.   Gabapentin, Morphine, Primidone, Penicillins, and Valacyclovir  Medical History[1]  Surgical History[2]  Family History[3]  @SOC    Current Medications[4]     ROS  Review of Systems   All other systems reviewed and are negative.      Physical Exam  Physical Exam  Constitutional:       Appearance: Normal appearance.   HENT:      Head: Normocephalic and atraumatic.   Cardiovascular:      Rate and Rhythm: Normal rate and regular rhythm.      Heart sounds:      Gallop present.   Pulmonary:      Effort: Pulmonary effort is normal.      Breath sounds: Normal breath sounds.   Abdominal:      General: Abdomen is flat.   Skin:     General: Skin is warm and dry.   Neurological:      General: No focal deficit present.      Mental Status: He is alert and oriented to person, place, and time.   Psychiatric:         Mood and Affect: Mood normal.         Behavior: Behavior normal.          EKG  Encounter Date: 07/03/25   ECG 12 Lead   Result Value    Ventricular Rate 71    Atrial Rate 71    IA Interval 172    QRS Duration 146    QT Interval 422    QTC Calculation(Bazett) 458    P Axis -22    R Axis -30    T Axis 21    QRS Count 12    Q Onset 223    P Onset 137    P Offset 196    T Offset 434    QTC Fredericia 446    Narrative    Sinus rhythm with occasional Premature ventricular complexes  Left axis deviation  Right bundle branch block  Abnormal ECG  When compared with ECG of 07-MAY-2025 14:36,  Premature ventricular complexes are now Present  T wave inversion no longer evident in Anterior leads       Problem List Items Addressed This Visit       Anxiety disorder    AS (aortic stenosis)    " 5/7/25 echocardiogram severe aortic stenosis with mild AI, moderate concentric LVH, LVEF = 55-60%, Tr with normal RVSP, mild MR         Athscl heart disease of native coronary artery w/o ang pctrs    7/3/25  EKG NSR at 71/min, RBBB         Hypertriglyceridemia    Hypothyroidism    Pericardial effusion (HHS-HCC)    Resolved on 5/7/25 echocadiogram         Presence of stent in coronary artery    Pure hypercholesterolemia    Hypertension    Relevant Orders    ECG 12 Lead (Completed)    PVD (peripheral vascular disease)    Right bundle branch block (RBBB)    Spinal stenosis    Neurogenic claudication    Type 2 diabetes mellitus with diabetic polyneuropathy, without long-term current use of insulin    Pre-operative clearance - Primary    At moderately increased risk for low to moderate risk procedure              Proceed with spinal surgery as planned 9/4/25 at [facility]  Return with previously scheduled office appointment      Anil Lindsey MD        [1]   Past Medical History:  Diagnosis Date    Abnormal result of other cardiovascular function study     Abnormal nuclear stress test    Acute ischemic heart disease, unspecified     ACS (acute coronary syndrome)    Atherosclerotic heart disease of native coronary artery without angina pectoris     Arteriosclerotic coronary artery disease    Chronic sinusitis, unspecified     Chronic congestion of paranasal sinus    Congenital heart disease     Coronary angioplasty status     History of percutaneous coronary intervention    Encounter for preprocedural cardiovascular examination     Pre-operative cardiovascular examination    Encounter for screening for malignant neoplasm of prostate     Screening PSA (prostate specific antigen)    Heart disease     Hypertension     Lyme disease, unspecified 12/02/2013    Lyme disease    Other cervical disc displacement, unspecified cervical region     Herniated cervical disc without myelopathy    Other specified postprocedural states      History of carpal tunnel surgery    Other systemic sclerosis     Cutaneous scleroderma    Personal history of malignant neoplasm, unspecified     History of malignant neoplasm    Personal history of other diseases of the circulatory system     History of hypertension    Personal history of other diseases of the circulatory system     History of abnormal electrocardiography    Personal history of other diseases of the digestive system     History of gastroesophageal reflux (GERD)    Personal history of other diseases of the musculoskeletal system and connective tissue     History of low back pain    Personal history of other diseases of the musculoskeletal system and connective tissue     History of spinal stenosis    Personal history of other diseases of the musculoskeletal system and connective tissue     History of arthritis    Personal history of other endocrine, nutritional and metabolic disease     History of hypothyroidism    Personal history of other endocrine, nutritional and metabolic disease     History of hyperlipidemia    Personal history of other endocrine, nutritional and metabolic disease     History of type 2 diabetes mellitus    Personal history of other endocrine, nutritional and metabolic disease     History of hypoglycemia    Personal history of other specified conditions     History of chest pain    Polyp of stomach and duodenum     Gastric polyp    Type 2 diabetes mellitus    [2]   Past Surgical History:  Procedure Laterality Date    COLONOSCOPY      CORONARY ANGIOPLASTY WITH STENT PLACEMENT  07/27/2018    Cath Placement Of Stent 1    HAND SURGERY  07/27/2018    Hand Surgery                                                                                                                                                          OTHER SURGICAL HISTORY  07/27/2018    Surgery    OTHER SURGICAL HISTORY  07/27/2018    Total Hip Replacement Left   [3]   Family History  Problem Relation Name Age of Onset     Stroke Mother      Heart failure Mother      Other (hepatic cirrhosis) Father      Parkinsonism Brother     [4]   Current Outpatient Medications   Medication Sig Dispense Refill    Accu-Chek Pippa Plus test strp strip USE TO TEST ONCE DAILY.      Accu-Chek Fastclix Lancet Drum misc 1 Lancet early in the morning..      amLODIPine (Norvasc) 10 mg tablet TAKE 1 TABLET BY MOUTH ONCE  DAILY 90 tablet 3    aspirin 81 mg chewable tablet Chew 1 tablet (81 mg) once daily. Ok to restart 2/7/25      atorvastatin (Lipitor) 40 mg tablet Take 1 tablet (40 mg) by mouth once daily.      bisacodyl (Dulcolax) 5 mg EC tablet Take 1 tablet (5 mg) by mouth once daily as needed for constipation for up to 10 doses. Do not crush, chew, or split. (Patient not taking: Reported on 6/30/2025) 10 tablet 0    brimonidine (AlphaGAN) 0.2 % ophthalmic solution Administer 1 drop into affected eye(s) twice a day.      coenzyme Q-10 300 mg capsule capsule Take 1 capsule (300 mg) by mouth once daily.      ergocalciferol (Vitamin D-2) 1250 mcg (50,000 units) capsule Take 1 capsule (1.25 mg) by mouth 1 (one) time per week.      famotidine (Pepcid) 40 mg tablet TAKE 1 TABLET BY MOUTH TWICE  DAILY 180 tablet 3    fenofibrate (Tricor) 48 mg tablet Take 1 tablet (48 mg) by mouth once daily. 90 tablet 3    finasteride (Proscar) 5 mg tablet Take 1 tablet (5 mg) by mouth once daily. (Patient taking differently: Take 1.25 mg by mouth once daily.) 90 tablet 3    glimepiride (Amaryl) 1 mg tablet Take 1 tablet (1 mg) by mouth once daily in the evening.  Take before meals.      glimepiride (Amaryl) 4 mg tablet Take 1 tablet (4 mg) by mouth once daily in the morning. Take before meals.      hydroCHLOROthiazide (HYDRODiuril) 25 mg tablet Take 1 tablet (25 mg) by mouth once daily. 90 tablet 3    insulin lispro (HUMALOG KWIKPEN INSULIN SUBQ) Inject under the skin 3 times a day as needed. Sliding Scale:  150-200= 2 units  201-250= 4 units  251-300= 6 units       ketorolac (Acular) 0.5 % ophthalmic solution INSTILL 1 DROP INTO THE RIGHT EYE FOUR TIMES DAILY FOR 21 DAYS      lactobacillus acidophilus capsule Take 1 capsule by mouth once daily. With breakfast      levothyroxine (Synthroid, Levoxyl) 50 mcg tablet Take 1 tablet (50 mcg) by mouth 5 times a week. Omit Wednesday & Sunday      levothyroxine (Synthroid, Levoxyl) 50 mcg tablet Take 2 tablets (100 mcg) by mouth 2 times a week. Take on an empty stomach at the same time each day, either 30 to 60 minutes prior to breakfast- Give on Wednesday & Sunday      Livalo 4 mg tablet Take 4 mg by mouth once daily in the evening.  Take before meals.      losartan (Cozaar) 50 mg tablet Take 1 tablet (50 mg) by mouth once daily.      metoprolol tartrate (Lopressor) 25 mg tablet TAKE 1 TABLET BY MOUTH TWICE  DAILY 180 tablet 3    multivitamin tablet Take 1 tablet by mouth once daily.      nitroglycerin (Nitrostat) 0.4 mg SL tablet Place 1 tablet (0.4 mg) under the tongue every 5 minutes if needed for chest pain. Under the tongue as needed for chest pain 25 tablet 2    omega-3 acid ethyl esters (Lovaza) 1 gram capsule       potassium chloride CR (K-Tab) 20 mEq ER tablet Take 1 tablet (20 mEq) by mouth 2 times a day.      prednisoLONE acetate (Pred-Forte) 1 % ophthalmic suspension       pregabalin (Lyrica) 75 mg capsule Take 1 capsule (75 mg) by mouth 2 times a day. 180 capsule 0    psyllium (Metamucil) powder Take by mouth once daily. 2 teaspoonfuls      vit C/E/Zn/coppr/lutein/zeaxan (PRESERVISION AREDS-2 ORAL) Take 1 tablet by mouth 2 times a day.      vit C/zinc citrate/elderberry (SAMBUCUS ELDERBERRY ORAL) Take 1 capsule by mouth once daily.       No current facility-administered medications for this visit.

## 2025-07-07 ENCOUNTER — HOSPITAL ENCOUNTER (OUTPATIENT)
Dept: RADIOLOGY | Facility: HOSPITAL | Age: 85
Discharge: HOME | End: 2025-07-07
Payer: MEDICARE

## 2025-07-07 DIAGNOSIS — M54.16 LUMBAR RADICULOPATHY: ICD-10-CM

## 2025-07-07 PROCEDURE — 72131 CT LUMBAR SPINE W/O DYE: CPT | Performed by: RADIOLOGY

## 2025-07-07 PROCEDURE — 72131 CT LUMBAR SPINE W/O DYE: CPT

## 2025-07-18 DIAGNOSIS — M54.16 LUMBAR RADICULOPATHY: Primary | ICD-10-CM

## 2025-07-18 RX ORDER — PREDNISONE 10 MG/1
TABLET ORAL
Qty: 35 TABLET | Refills: 0 | Status: SHIPPED | OUTPATIENT
Start: 2025-07-18 | End: 2025-08-03

## 2025-07-21 DIAGNOSIS — I10 PRIMARY HYPERTENSION: ICD-10-CM

## 2025-07-22 RX ORDER — METOPROLOL TARTRATE 25 MG/1
25 TABLET, FILM COATED ORAL 2 TIMES DAILY
Qty: 180 TABLET | Refills: 3 | Status: SHIPPED | OUTPATIENT
Start: 2025-07-22

## 2025-08-01 LAB
NON-UH HIE A/G RATIO: 1.5
NON-UH HIE ALB: 4.4 G/DL (ref 3.4–5)
NON-UH HIE ALK PHOS: 39 UNIT/L (ref 45–117)
NON-UH HIE BILIRUBIN, TOTAL: 0.9 MG/DL (ref 0.3–1.2)
NON-UH HIE BUN/CREAT RATIO: 16.2
NON-UH HIE BUN: 21 MG/DL (ref 9–23)
NON-UH HIE CALCIUM: 10 MG/DL (ref 8.7–10.4)
NON-UH HIE CALCULATED LDL CHOLESTEROL: 37 MG/DL (ref 60–130)
NON-UH HIE CALCULATED OSMOLALITY: 291 MOSM/KG (ref 275–295)
NON-UH HIE CHLORIDE: 104 MMOL/L (ref 98–107)
NON-UH HIE CHOLESTEROL: 141 MG/DL (ref 100–200)
NON-UH HIE CO2, VENOUS: 25 MMOL/L (ref 20–31)
NON-UH HIE CREATININE, URINE MG/DL: 115.9 MG/DL
NON-UH HIE CREATININE: 1.3 MG/DL (ref 0.6–1.1)
NON-UH HIE DIRECT LDL: 61 MG/DL
NON-UH HIE GFR AA: >60
NON-UH HIE GLOBULIN: 2.9 G/DL
NON-UH HIE GLOMERULAR FILTRATION RATE: 53 ML/MIN/1.73M?
NON-UH HIE GLUCOSE: 178 MG/DL (ref 74–106)
NON-UH HIE GOT: 28 UNIT/L (ref 15–37)
NON-UH HIE GPT: 40 UNIT/L (ref 10–49)
NON-UH HIE HDL CHOLESTEROL: 38 MG/DL (ref 40–60)
NON-UH HIE HGB A1C: 6.6 %
NON-UH HIE K: 3.8 MMOL/L (ref 3.5–5.1)
NON-UH HIE MICROALBUMIN, URINE MG/L: 42 MG/L
NON-UH HIE MICROALBUMIN/CREATININE RATIO: 36 MG MALB/GM CREAT (ref 0–30)
NON-UH HIE NA: 142 MMOL/L (ref 135–145)
NON-UH HIE TOTAL CHOL/HDL CHOL RATIO: 3.7
NON-UH HIE TOTAL PROTEIN: 7.3 G/DL (ref 5.7–8.2)
NON-UH HIE TRIGLYCERIDES: 330 MG/DL (ref 30–150)
NON-UH HIE VIT D 25: 28 NG/ML

## 2025-08-12 ENCOUNTER — APPOINTMENT (OUTPATIENT)
Dept: PRIMARY CARE | Facility: CLINIC | Age: 85
End: 2025-08-12
Payer: MEDICARE

## 2025-08-22 ENCOUNTER — HOSPITAL ENCOUNTER (OUTPATIENT)
Dept: RADIOLOGY | Facility: HOSPITAL | Age: 85
Discharge: HOME | End: 2025-08-22
Payer: MEDICARE

## 2025-08-22 ENCOUNTER — APPOINTMENT (OUTPATIENT)
Dept: LAB | Facility: HOSPITAL | Age: 85
End: 2025-08-22
Payer: MEDICARE

## 2025-08-22 ENCOUNTER — PRE-ADMISSION TESTING (OUTPATIENT)
Dept: PREADMISSION TESTING | Facility: HOSPITAL | Age: 85
End: 2025-08-22
Payer: MEDICARE

## 2025-08-22 VITALS
DIASTOLIC BLOOD PRESSURE: 69 MMHG | RESPIRATION RATE: 16 BRPM | OXYGEN SATURATION: 98 % | BODY MASS INDEX: 28.5 KG/M2 | WEIGHT: 188.05 LBS | HEIGHT: 68 IN | HEART RATE: 74 BPM | SYSTOLIC BLOOD PRESSURE: 138 MMHG | TEMPERATURE: 97.3 F

## 2025-08-22 DIAGNOSIS — Z01.818 PREOP EXAMINATION: Primary | ICD-10-CM

## 2025-08-22 DIAGNOSIS — M54.16 LUMBAR RADICULOPATHY: ICD-10-CM

## 2025-08-22 DIAGNOSIS — Z01.818 PRE-OP TESTING: ICD-10-CM

## 2025-08-22 LAB
ABO GROUP (TYPE) IN BLOOD: NORMAL
ANION GAP SERPL CALC-SCNC: 15 MMOL/L (ref 10–20)
ANTIBODY SCREEN: NORMAL
APTT PPP: 35 SECONDS (ref 26–36)
BASOPHILS # BLD AUTO: 0.05 X10*3/UL (ref 0–0.1)
BASOPHILS NFR BLD AUTO: 0.5 %
BUN SERPL-MCNC: 27 MG/DL (ref 6–23)
CALCIUM SERPL-MCNC: 10.4 MG/DL (ref 8.6–10.3)
CHLORIDE SERPL-SCNC: 100 MMOL/L (ref 98–107)
CO2 SERPL-SCNC: 27 MMOL/L (ref 21–32)
CREAT SERPL-MCNC: 1.26 MG/DL (ref 0.5–1.3)
EGFRCR SERPLBLD CKD-EPI 2021: 57 ML/MIN/1.73M*2
EOSINOPHIL # BLD AUTO: 0.11 X10*3/UL (ref 0–0.4)
EOSINOPHIL NFR BLD AUTO: 1.1 %
ERYTHROCYTE [DISTWIDTH] IN BLOOD BY AUTOMATED COUNT: 13.7 % (ref 11.5–14.5)
GLUCOSE SERPL-MCNC: 119 MG/DL (ref 74–99)
HCT VFR BLD AUTO: 48.1 % (ref 41–52)
HGB BLD-MCNC: 15.4 G/DL (ref 13.5–17.5)
IMM GRANULOCYTES # BLD AUTO: 0.04 X10*3/UL (ref 0–0.5)
IMM GRANULOCYTES NFR BLD AUTO: 0.4 % (ref 0–0.9)
INR PPP: 1 (ref 0.9–1.1)
LYMPHOCYTES # BLD AUTO: 1.84 X10*3/UL (ref 0.8–3)
LYMPHOCYTES NFR BLD AUTO: 18.4 %
MCH RBC QN AUTO: 31.6 PG (ref 26–34)
MCHC RBC AUTO-ENTMCNC: 32 G/DL (ref 32–36)
MCV RBC AUTO: 99 FL (ref 80–100)
MONOCYTES # BLD AUTO: 0.8 X10*3/UL (ref 0.05–0.8)
MONOCYTES NFR BLD AUTO: 8 %
NEUTROPHILS # BLD AUTO: 7.14 X10*3/UL (ref 1.6–5.5)
NEUTROPHILS NFR BLD AUTO: 71.6 %
NRBC BLD-RTO: 0 /100 WBCS (ref 0–0)
PLATELET # BLD AUTO: 216 X10*3/UL (ref 150–450)
POTASSIUM SERPL-SCNC: 4.4 MMOL/L (ref 3.5–5.3)
PROTHROMBIN TIME: 10.6 SECONDS (ref 9.8–12.4)
RBC # BLD AUTO: 4.87 X10*6/UL (ref 4.5–5.9)
RH FACTOR (ANTIGEN D): NORMAL
SODIUM SERPL-SCNC: 138 MMOL/L (ref 136–145)
WBC # BLD AUTO: 10 X10*3/UL (ref 4.4–11.3)

## 2025-08-22 PROCEDURE — 86901 BLOOD TYPING SEROLOGIC RH(D): CPT

## 2025-08-22 PROCEDURE — 84134 ASSAY OF PREALBUMIN: CPT

## 2025-08-22 PROCEDURE — 99203 OFFICE O/P NEW LOW 30 MIN: CPT | Performed by: NURSE PRACTITIONER

## 2025-08-22 PROCEDURE — 86850 RBC ANTIBODY SCREEN: CPT

## 2025-08-22 PROCEDURE — 80048 BASIC METABOLIC PNL TOTAL CA: CPT

## 2025-08-22 PROCEDURE — 85730 THROMBOPLASTIN TIME PARTIAL: CPT

## 2025-08-22 PROCEDURE — 87081 CULTURE SCREEN ONLY: CPT | Mod: STJLAB

## 2025-08-22 PROCEDURE — 86900 BLOOD TYPING SEROLOGIC ABO: CPT

## 2025-08-22 PROCEDURE — 71046 X-RAY EXAM CHEST 2 VIEWS: CPT

## 2025-08-22 PROCEDURE — 85610 PROTHROMBIN TIME: CPT

## 2025-08-22 PROCEDURE — 85025 COMPLETE CBC W/AUTO DIFF WBC: CPT

## 2025-08-22 RX ORDER — CHLORHEXIDINE GLUCONATE ORAL RINSE 1.2 MG/ML
SOLUTION DENTAL
Qty: 473 ML | Refills: 0 | Status: SHIPPED | OUTPATIENT
Start: 2025-08-22

## 2025-08-22 RX ORDER — ASCORBIC ACID 500 MG
500 TABLET ORAL DAILY
COMMUNITY

## 2025-08-22 RX ORDER — PROPYLENE GLYCOL 0.06 MG/ML
1 SOLUTION/ DROPS OPHTHALMIC 3 TIMES DAILY PRN
COMMUNITY

## 2025-08-22 ASSESSMENT — DUKE ACTIVITY SCORE INDEX (DASI)
CAN YOU DO YARD WORK LIKE RAKING LEAVES, WEEDING OR PUSHING A MOWER: NO
CAN YOU DO LIGHT WORK AROUND THE HOUSE LIKE DUSTING OR WASHING DISHES: YES
CAN YOU CLIMB A FLIGHT OF STAIRS OR WALK UP A HILL: YES
CAN YOU TAKE CARE OF YOURSELF (EAT, DRESS, BATHE, OR USE TOILET): YES
CAN YOU PARTICIPATE IN STRENOUS SPORTS LIKE SWIMMING, SINGLES TENNIS, FOOTBALL, BASKETBALL, OR SKIING: NO
CAN YOU DO HEAVY WORK AROUND THE HOUSE LIKE SCRUBBING FLOORS OR LIFTING AND MOVING HEAVY FURNITURE: NO
CAN YOU WALK INDOORS, SUCH AS AROUND YOUR HOUSE: YES
TOTAL_SCORE: 16.2
CAN YOU RUN A SHORT DISTANCE: NO
CAN YOU WALK A BLOCK OR TWO ON LEVEL GROUND: NO
CAN YOU PARTICIPATE IN MODERATE RECREATIONAL ACTIVITIES LIKE GOLF, BOWLING, DANCING, DOUBLES TENNIS OR THROWING A BASEBALL OR FOOTBALL: NO
DASI METS SCORE: 4.7
CAN YOU HAVE SEXUAL RELATIONS: NO
CAN YOU DO MODERATE WORK AROUND THE HOUSE LIKE VACUUMING, SWEEPING FLOORS OR CARRYING GROCERIES: YES

## 2025-08-22 ASSESSMENT — LIFESTYLE VARIABLES: SMOKING_STATUS: NONSMOKER

## 2025-08-22 ASSESSMENT — ACTIVITIES OF DAILY LIVING (ADL): ADL_SCORE: 1

## 2025-08-22 ASSESSMENT — PAIN - FUNCTIONAL ASSESSMENT: PAIN_FUNCTIONAL_ASSESSMENT: 0-10

## 2025-08-23 LAB — PREALB SERPL-MCNC: 40.2 MG/DL (ref 18–40)

## 2025-08-24 LAB — STAPHYLOCOCCUS SPEC CULT: NORMAL

## 2025-09-03 ENCOUNTER — ANESTHESIA EVENT (OUTPATIENT)
Dept: OPERATING ROOM | Facility: HOSPITAL | Age: 85
End: 2025-09-03
Payer: MEDICARE

## 2025-09-04 ENCOUNTER — HOSPITAL ENCOUNTER (INPATIENT)
Facility: HOSPITAL | Age: 85
LOS: 1 days | Discharge: HOME | End: 2025-09-07
Attending: STUDENT IN AN ORGANIZED HEALTH CARE EDUCATION/TRAINING PROGRAM | Admitting: STUDENT IN AN ORGANIZED HEALTH CARE EDUCATION/TRAINING PROGRAM
Payer: MEDICARE

## 2025-09-04 ENCOUNTER — ANESTHESIA (OUTPATIENT)
Dept: OPERATING ROOM | Facility: HOSPITAL | Age: 85
End: 2025-09-04
Payer: MEDICARE

## 2025-09-04 DIAGNOSIS — M54.16 LUMBAR RADICULOPATHY: Primary | ICD-10-CM

## 2025-09-04 DIAGNOSIS — G89.18 POST-OPERATIVE PAIN: ICD-10-CM

## 2025-09-04 DIAGNOSIS — Z98.1 S/P LUMBAR SPINAL FUSION: ICD-10-CM

## 2025-09-04 LAB
ABO GROUP (TYPE) IN BLOOD: NORMAL
ANION GAP BLDA CALCULATED.4IONS-SCNC: 11 MMO/L (ref 10–25)
ANTIBODY SCREEN: NORMAL
BASE EXCESS BLDA CALC-SCNC: 0.5 MMOL/L (ref -2–3)
BODY TEMPERATURE: 37 DEGREES CELSIUS
CA-I BLDA-SCNC: 1.14 MMOL/L (ref 1.1–1.33)
CHLORIDE BLDA-SCNC: 102 MMOL/L (ref 98–107)
COHGB MFR BLDA: 1.2 %
DO-HGB MFR BLDA: 0.6 % (ref 0–5)
GLUCOSE BLD MANUAL STRIP-MCNC: 192 MG/DL (ref 74–99)
GLUCOSE BLDA-MCNC: 190 MG/DL (ref 74–99)
HCO3 BLDA-SCNC: 25.4 MMOL/L (ref 22–26)
HCT VFR BLD EST: 41 % (ref 41–52)
HGB BLDA-MCNC: 13.8 G/DL (ref 13.5–17.5)
HGB BLDA-MCNC: 13.8 G/DL (ref 13.5–17.5)
LACTATE BLDA-SCNC: 2.6 MMOL/L (ref 0.4–2)
METHGB MFR BLDA: 0.6 % (ref 0–1.5)
OXYHGB MFR BLDA: 97.5 % (ref 94–98)
OXYHGB MFR BLDA: 97.5 % (ref 94–98)
PCO2 BLDA: 41 MM HG (ref 38–42)
PH BLDA: 7.4 PH (ref 7.38–7.42)
PO2 BLDA: 226 MM HG (ref 85–95)
POTASSIUM BLDA-SCNC: 4.1 MMOL/L (ref 3.5–5.3)
RH FACTOR (ANTIGEN D): NORMAL
SAO2 % BLDA: 99 % (ref 94–100)
SODIUM BLDA-SCNC: 134 MMOL/L (ref 136–145)

## 2025-09-04 PROCEDURE — 2500000001 HC RX 250 WO HCPCS SELF ADMINISTERED DRUGS (ALT 637 FOR MEDICARE OP): Performed by: STUDENT IN AN ORGANIZED HEALTH CARE EDUCATION/TRAINING PROGRAM

## 2025-09-04 PROCEDURE — 7100000002 HC RECOVERY ROOM TIME - EACH INCREMENTAL 1 MINUTE: Performed by: STUDENT IN AN ORGANIZED HEALTH CARE EDUCATION/TRAINING PROGRAM

## 2025-09-04 PROCEDURE — 2500000004 HC RX 250 GENERAL PHARMACY W/ HCPCS (ALT 636 FOR OP/ED): Mod: JZ | Performed by: ANESTHESIOLOGY

## 2025-09-04 PROCEDURE — 82947 ASSAY GLUCOSE BLOOD QUANT: CPT

## 2025-09-04 PROCEDURE — A22633 PR ARTHDSIS POST/POSTEROLATRL/POSTINTERBODY LUMBAR: Performed by: ANESTHESIOLOGIST ASSISTANT

## 2025-09-04 PROCEDURE — 36620 INSERTION CATHETER ARTERY: CPT | Performed by: ANESTHESIOLOGY

## 2025-09-04 PROCEDURE — 22633 ARTHRD CMBN 1NTRSPC LUMBAR: CPT | Performed by: STUDENT IN AN ORGANIZED HEALTH CARE EDUCATION/TRAINING PROGRAM

## 2025-09-04 PROCEDURE — C1763 CONN TISS, NON-HUMAN: HCPCS | Performed by: STUDENT IN AN ORGANIZED HEALTH CARE EDUCATION/TRAINING PROGRAM

## 2025-09-04 PROCEDURE — 22842 INSERT SPINE FIXATION DEVICE: CPT | Performed by: STUDENT IN AN ORGANIZED HEALTH CARE EDUCATION/TRAINING PROGRAM

## 2025-09-04 PROCEDURE — 2500000005 HC RX 250 GENERAL PHARMACY W/O HCPCS: Performed by: ANESTHESIOLOGY

## 2025-09-04 PROCEDURE — A22633 PR ARTHDSIS POST/POSTEROLATRL/POSTINTERBODY LUMBAR: Performed by: ANESTHESIOLOGY

## 2025-09-04 PROCEDURE — 82810 BLOOD GASES O2 SAT ONLY: CPT

## 2025-09-04 PROCEDURE — 7100000001 HC RECOVERY ROOM TIME - INITIAL BASE CHARGE: Performed by: STUDENT IN AN ORGANIZED HEALTH CARE EDUCATION/TRAINING PROGRAM

## 2025-09-04 PROCEDURE — 2500000005 HC RX 250 GENERAL PHARMACY W/O HCPCS: Performed by: STUDENT IN AN ORGANIZED HEALTH CARE EDUCATION/TRAINING PROGRAM

## 2025-09-04 PROCEDURE — 99100 ANES PT EXTEME AGE<1 YR&>70: CPT | Performed by: ANESTHESIOLOGY

## 2025-09-04 PROCEDURE — C1713 ANCHOR/SCREW BN/BN,TIS/BN: HCPCS | Performed by: STUDENT IN AN ORGANIZED HEALTH CARE EDUCATION/TRAINING PROGRAM

## 2025-09-04 PROCEDURE — 2500000004 HC RX 250 GENERAL PHARMACY W/ HCPCS (ALT 636 FOR OP/ED): Mod: JW | Performed by: ANESTHESIOLOGIST ASSISTANT

## 2025-09-04 PROCEDURE — 3700000001 HC GENERAL ANESTHESIA TIME - INITIAL BASE CHARGE: Performed by: STUDENT IN AN ORGANIZED HEALTH CARE EDUCATION/TRAINING PROGRAM

## 2025-09-04 PROCEDURE — 22634 ARTHRD CMBN 1NTRSPC EA ADDL: CPT | Performed by: STUDENT IN AN ORGANIZED HEALTH CARE EDUCATION/TRAINING PROGRAM

## 2025-09-04 PROCEDURE — 2780000003 HC OR 278 NO HCPCS: Performed by: STUDENT IN AN ORGANIZED HEALTH CARE EDUCATION/TRAINING PROGRAM

## 2025-09-04 PROCEDURE — 2500000005 HC RX 250 GENERAL PHARMACY W/O HCPCS: Performed by: ANESTHESIOLOGIST ASSISTANT

## 2025-09-04 PROCEDURE — 3700000002 HC GENERAL ANESTHESIA TIME - EACH INCREMENTAL 1 MINUTE: Performed by: STUDENT IN AN ORGANIZED HEALTH CARE EDUCATION/TRAINING PROGRAM

## 2025-09-04 PROCEDURE — 84132 ASSAY OF SERUM POTASSIUM: CPT

## 2025-09-04 PROCEDURE — 2500000004 HC RX 250 GENERAL PHARMACY W/ HCPCS (ALT 636 FOR OP/ED): Performed by: ANESTHESIOLOGIST ASSISTANT

## 2025-09-04 PROCEDURE — 61783 SCAN PROC SPINAL: CPT | Performed by: STUDENT IN AN ORGANIZED HEALTH CARE EDUCATION/TRAINING PROGRAM

## 2025-09-04 PROCEDURE — S0109 METHADONE ORAL 5MG: HCPCS | Performed by: ANESTHESIOLOGIST ASSISTANT

## 2025-09-04 PROCEDURE — C1889 IMPLANT/INSERT DEVICE, NOC: HCPCS | Performed by: STUDENT IN AN ORGANIZED HEALTH CARE EDUCATION/TRAINING PROGRAM

## 2025-09-04 PROCEDURE — 36415 COLL VENOUS BLD VENIPUNCTURE: CPT | Performed by: STUDENT IN AN ORGANIZED HEALTH CARE EDUCATION/TRAINING PROGRAM

## 2025-09-04 PROCEDURE — 2500000002 HC RX 250 W HCPCS SELF ADMINISTERED DRUGS (ALT 637 FOR MEDICARE OP, ALT 636 FOR OP/ED): Performed by: STUDENT IN AN ORGANIZED HEALTH CARE EDUCATION/TRAINING PROGRAM

## 2025-09-04 PROCEDURE — 2500000004 HC RX 250 GENERAL PHARMACY W/ HCPCS (ALT 636 FOR OP/ED): Performed by: STUDENT IN AN ORGANIZED HEALTH CARE EDUCATION/TRAINING PROGRAM

## 2025-09-04 PROCEDURE — 3600000018 HC OR TIME - INITIAL BASE CHARGE - PROCEDURE LEVEL SIX: Performed by: STUDENT IN AN ORGANIZED HEALTH CARE EDUCATION/TRAINING PROGRAM

## 2025-09-04 PROCEDURE — 2500000002 HC RX 250 W HCPCS SELF ADMINISTERED DRUGS (ALT 637 FOR MEDICARE OP, ALT 636 FOR OP/ED): Performed by: ANESTHESIOLOGIST ASSISTANT

## 2025-09-04 PROCEDURE — 3600000017 HC OR TIME - EACH INCREMENTAL 1 MINUTE - PROCEDURE LEVEL SIX: Performed by: STUDENT IN AN ORGANIZED HEALTH CARE EDUCATION/TRAINING PROGRAM

## 2025-09-04 PROCEDURE — 2720000007 HC OR 272 NO HCPCS: Performed by: STUDENT IN AN ORGANIZED HEALTH CARE EDUCATION/TRAINING PROGRAM

## 2025-09-04 PROCEDURE — P9045 ALBUMIN (HUMAN), 5%, 250 ML: HCPCS | Mod: JZ | Performed by: ANESTHESIOLOGIST ASSISTANT

## 2025-09-04 PROCEDURE — 86901 BLOOD TYPING SEROLOGIC RH(D): CPT | Performed by: STUDENT IN AN ORGANIZED HEALTH CARE EDUCATION/TRAINING PROGRAM

## 2025-09-04 PROCEDURE — 22853 INSJ BIOMECHANICAL DEVICE: CPT | Performed by: STUDENT IN AN ORGANIZED HEALTH CARE EDUCATION/TRAINING PROGRAM

## 2025-09-04 PROCEDURE — 7100000011 HC EXTENDED STAY RECOVERY HOURLY - NURSING UNIT

## 2025-09-04 DEVICE — IMPLANTABLE DEVICE: Type: IMPLANTABLE DEVICE | Site: BACK | Status: FUNCTIONAL

## 2025-09-04 DEVICE — SCREW SET, SOLERA VOYAGER, 5.5/6.0: Type: IMPLANTABLE DEVICE | Site: BACK | Status: FUNCTIONAL

## 2025-09-04 DEVICE — SPACER, CATALYFT PL, 7MM, SHORT: Type: IMPLANTABLE DEVICE | Site: BACK | Status: FUNCTIONAL

## 2025-09-04 DEVICE — PUTTY, GRAFTON, 6CC, W/CANNULA: Type: IMPLANTABLE DEVICE | Site: BACK | Status: FUNCTIONAL

## 2025-09-04 RX ORDER — METHADONE HYDROCHLORIDE 10 MG/1
TABLET ORAL AS NEEDED
Status: DISCONTINUED | OUTPATIENT
Start: 2025-09-04 | End: 2025-09-04

## 2025-09-04 RX ORDER — INSULIN LISPRO 100 [IU]/ML
0-5 INJECTION, SOLUTION INTRAVENOUS; SUBCUTANEOUS
Status: DISCONTINUED | OUTPATIENT
Start: 2025-09-05 | End: 2025-09-07 | Stop reason: HOSPADM

## 2025-09-04 RX ORDER — TRANEXAMIC ACID 650 MG/1
1300 TABLET ORAL ONCE
Status: COMPLETED | OUTPATIENT
Start: 2025-09-04 | End: 2025-09-04

## 2025-09-04 RX ORDER — MIDAZOLAM HYDROCHLORIDE 1 MG/ML
1 INJECTION, SOLUTION INTRAMUSCULAR; INTRAVENOUS ONCE AS NEEDED
Status: DISCONTINUED | OUTPATIENT
Start: 2025-09-04 | End: 2025-09-04 | Stop reason: HOSPADM

## 2025-09-04 RX ORDER — ESMOLOL HYDROCHLORIDE 10 MG/ML
INJECTION INTRAVENOUS AS NEEDED
Status: DISCONTINUED | OUTPATIENT
Start: 2025-09-04 | End: 2025-09-04

## 2025-09-04 RX ORDER — SODIUM CHLORIDE, SODIUM LACTATE, POTASSIUM CHLORIDE, CALCIUM CHLORIDE 600; 310; 30; 20 MG/100ML; MG/100ML; MG/100ML; MG/100ML
INJECTION, SOLUTION INTRAVENOUS CONTINUOUS PRN
Status: DISCONTINUED | OUTPATIENT
Start: 2025-09-04 | End: 2025-09-04

## 2025-09-04 RX ORDER — CYCLOBENZAPRINE HCL 5 MG
5 TABLET ORAL 3 TIMES DAILY PRN
Status: DISCONTINUED | OUTPATIENT
Start: 2025-09-04 | End: 2025-09-06

## 2025-09-04 RX ORDER — ACETAMINOPHEN 325 MG/1
975 TABLET ORAL ONCE
Status: COMPLETED | OUTPATIENT
Start: 2025-09-04 | End: 2025-09-04

## 2025-09-04 RX ORDER — PROPOFOL 10 MG/ML
INJECTION, EMULSION INTRAVENOUS AS NEEDED
Status: DISCONTINUED | OUTPATIENT
Start: 2025-09-04 | End: 2025-09-04

## 2025-09-04 RX ORDER — HYDROMORPHONE HYDROCHLORIDE 1 MG/ML
1 INJECTION, SOLUTION INTRAMUSCULAR; INTRAVENOUS; SUBCUTANEOUS EVERY 5 MIN PRN
Status: DISCONTINUED | OUTPATIENT
Start: 2025-09-04 | End: 2025-09-04 | Stop reason: HOSPADM

## 2025-09-04 RX ORDER — ONDANSETRON HYDROCHLORIDE 2 MG/ML
4 INJECTION, SOLUTION INTRAVENOUS EVERY 8 HOURS PRN
Status: DISCONTINUED | OUTPATIENT
Start: 2025-09-04 | End: 2025-09-07 | Stop reason: HOSPADM

## 2025-09-04 RX ORDER — PHENYLEPHRINE 10 MG/250 ML(40 MCG/ML)IN 0.9 % SOD.CHLORIDE INTRAVENOUS
CONTINUOUS PRN
Status: DISCONTINUED | OUTPATIENT
Start: 2025-09-04 | End: 2025-09-04

## 2025-09-04 RX ORDER — ACETAMINOPHEN 325 MG/1
650 TABLET ORAL EVERY 6 HOURS
Status: DISCONTINUED | OUTPATIENT
Start: 2025-09-04 | End: 2025-09-07 | Stop reason: HOSPADM

## 2025-09-04 RX ORDER — LABETALOL HYDROCHLORIDE 5 MG/ML
5 INJECTION, SOLUTION INTRAVENOUS
Status: DISCONTINUED | OUTPATIENT
Start: 2025-09-04 | End: 2025-09-04 | Stop reason: HOSPADM

## 2025-09-04 RX ORDER — ALBUTEROL SULFATE 0.83 MG/ML
2.5 SOLUTION RESPIRATORY (INHALATION)
Status: DISCONTINUED | OUTPATIENT
Start: 2025-09-04 | End: 2025-09-04 | Stop reason: HOSPADM

## 2025-09-04 RX ORDER — ERGOCALCIFEROL 1.25 MG/1
1.25 CAPSULE ORAL WEEKLY
Status: DISCONTINUED | OUTPATIENT
Start: 2025-09-04 | End: 2025-09-07 | Stop reason: HOSPADM

## 2025-09-04 RX ORDER — LIDOCAINE HYDROCHLORIDE AND EPINEPHRINE 10; 10 UG/ML; MG/ML
INJECTION, SOLUTION INFILTRATION; PERINEURAL AS NEEDED
Status: DISCONTINUED | OUTPATIENT
Start: 2025-09-04 | End: 2025-09-04 | Stop reason: HOSPADM

## 2025-09-04 RX ORDER — ADHESIVE BANDAGE
30 BANDAGE TOPICAL DAILY PRN
Status: DISCONTINUED | OUTPATIENT
Start: 2025-09-04 | End: 2025-09-05

## 2025-09-04 RX ORDER — DEXTROSE 50 % IN WATER (D50W) INTRAVENOUS SYRINGE
12.5
Status: DISCONTINUED | OUTPATIENT
Start: 2025-09-04 | End: 2025-09-07 | Stop reason: HOSPADM

## 2025-09-04 RX ORDER — METOPROLOL TARTRATE 25 MG/1
25 TABLET, FILM COATED ORAL 2 TIMES DAILY
Status: DISCONTINUED | OUTPATIENT
Start: 2025-09-04 | End: 2025-09-07 | Stop reason: HOSPADM

## 2025-09-04 RX ORDER — OXYCODONE HYDROCHLORIDE 5 MG/1
5 TABLET ORAL EVERY 4 HOURS PRN
Status: DISCONTINUED | OUTPATIENT
Start: 2025-09-04 | End: 2025-09-06

## 2025-09-04 RX ORDER — ATORVASTATIN CALCIUM 40 MG/1
40 TABLET, FILM COATED ORAL NIGHTLY
Status: DISCONTINUED | OUTPATIENT
Start: 2025-09-04 | End: 2025-09-07 | Stop reason: HOSPADM

## 2025-09-04 RX ORDER — CELECOXIB 200 MG/1
400 CAPSULE ORAL ONCE
Status: COMPLETED | OUTPATIENT
Start: 2025-09-04 | End: 2025-09-04

## 2025-09-04 RX ORDER — DIPHENHYDRAMINE HYDROCHLORIDE 50 MG/ML
12.5 INJECTION, SOLUTION INTRAMUSCULAR; INTRAVENOUS ONCE AS NEEDED
Status: DISCONTINUED | OUTPATIENT
Start: 2025-09-04 | End: 2025-09-04 | Stop reason: HOSPADM

## 2025-09-04 RX ORDER — HYDRALAZINE HYDROCHLORIDE 20 MG/ML
5 INJECTION INTRAMUSCULAR; INTRAVENOUS EVERY 30 MIN PRN
Status: DISCONTINUED | OUTPATIENT
Start: 2025-09-04 | End: 2025-09-04 | Stop reason: HOSPADM

## 2025-09-04 RX ORDER — OXYCODONE HYDROCHLORIDE 5 MG/1
2.5 TABLET ORAL EVERY 4 HOURS PRN
Status: DISCONTINUED | OUTPATIENT
Start: 2025-09-04 | End: 2025-09-07 | Stop reason: HOSPADM

## 2025-09-04 RX ORDER — HYDROMORPHONE HYDROCHLORIDE 1 MG/ML
INJECTION, SOLUTION INTRAMUSCULAR; INTRAVENOUS; SUBCUTANEOUS AS NEEDED
Status: DISCONTINUED | OUTPATIENT
Start: 2025-09-04 | End: 2025-09-04

## 2025-09-04 RX ORDER — GLIMEPIRIDE 1 MG/1
1 TABLET ORAL EVERY EVENING
Status: DISCONTINUED | OUTPATIENT
Start: 2025-09-04 | End: 2025-09-07 | Stop reason: HOSPADM

## 2025-09-04 RX ORDER — WATER 1 ML/ML
INJECTION IRRIGATION AS NEEDED
Status: DISCONTINUED | OUTPATIENT
Start: 2025-09-04 | End: 2025-09-04 | Stop reason: HOSPADM

## 2025-09-04 RX ORDER — METOCLOPRAMIDE HYDROCHLORIDE 5 MG/ML
10 INJECTION INTRAMUSCULAR; INTRAVENOUS ONCE AS NEEDED
Status: DISCONTINUED | OUTPATIENT
Start: 2025-09-04 | End: 2025-09-04 | Stop reason: HOSPADM

## 2025-09-04 RX ORDER — ALBUMIN HUMAN 50 G/1000ML
SOLUTION INTRAVENOUS AS NEEDED
Status: DISCONTINUED | OUTPATIENT
Start: 2025-09-04 | End: 2025-09-04

## 2025-09-04 RX ORDER — AMOXICILLIN 250 MG
2 CAPSULE ORAL 2 TIMES DAILY
Status: DISCONTINUED | OUTPATIENT
Start: 2025-09-04 | End: 2025-09-07 | Stop reason: HOSPADM

## 2025-09-04 RX ORDER — FENOFIBRATE 54 MG/1
54 TABLET ORAL DAILY
Status: DISCONTINUED | OUTPATIENT
Start: 2025-09-04 | End: 2025-09-07 | Stop reason: HOSPADM

## 2025-09-04 RX ORDER — ICOSAPENT ETHYL 1 G/1
2 CAPSULE ORAL
Status: DISCONTINUED | OUTPATIENT
Start: 2025-09-05 | End: 2025-09-07 | Stop reason: HOSPADM

## 2025-09-04 RX ORDER — POTASSIUM CHLORIDE 20 MEQ/1
20 TABLET, EXTENDED RELEASE ORAL 2 TIMES DAILY
Status: DISCONTINUED | OUTPATIENT
Start: 2025-09-04 | End: 2025-09-07 | Stop reason: HOSPADM

## 2025-09-04 RX ORDER — NALOXONE HYDROCHLORIDE 0.4 MG/ML
0.2 INJECTION, SOLUTION INTRAMUSCULAR; INTRAVENOUS; SUBCUTANEOUS EVERY 5 MIN PRN
Status: DISCONTINUED | OUTPATIENT
Start: 2025-09-04 | End: 2025-09-07 | Stop reason: HOSPADM

## 2025-09-04 RX ORDER — ASCORBIC ACID 500 MG
500 TABLET ORAL DAILY
Status: DISCONTINUED | OUTPATIENT
Start: 2025-09-04 | End: 2025-09-07 | Stop reason: HOSPADM

## 2025-09-04 RX ORDER — FINASTERIDE 5 MG/1
5 TABLET, FILM COATED ORAL DAILY
Status: DISCONTINUED | OUTPATIENT
Start: 2025-09-05 | End: 2025-09-07 | Stop reason: HOSPADM

## 2025-09-04 RX ORDER — GLIMEPIRIDE 1 MG/1
4 TABLET ORAL EVERY EVENING
Status: DISCONTINUED | OUTPATIENT
Start: 2025-09-04 | End: 2025-09-07 | Stop reason: HOSPADM

## 2025-09-04 RX ORDER — HYDROCHLOROTHIAZIDE 25 MG/1
25 TABLET ORAL DAILY
Status: DISCONTINUED | OUTPATIENT
Start: 2025-09-05 | End: 2025-09-07 | Stop reason: HOSPADM

## 2025-09-04 RX ORDER — NAPROXEN SODIUM 220 MG/1
81 TABLET, FILM COATED ORAL DAILY
Status: DISCONTINUED | OUTPATIENT
Start: 2025-09-04 | End: 2025-09-07 | Stop reason: HOSPADM

## 2025-09-04 RX ORDER — PHENYLEPHRINE HYDROCHLORIDE 10 MG/ML
INJECTION INTRAVENOUS AS NEEDED
Status: DISCONTINUED | OUTPATIENT
Start: 2025-09-04 | End: 2025-09-04

## 2025-09-04 RX ORDER — LEVOTHYROXINE SODIUM 50 UG/1
50 TABLET ORAL
Status: DISCONTINUED | OUTPATIENT
Start: 2025-09-06 | End: 2025-09-07 | Stop reason: HOSPADM

## 2025-09-04 RX ORDER — NITROGLYCERIN 0.4 MG/1
0.4 TABLET SUBLINGUAL EVERY 5 MIN PRN
Status: DISCONTINUED | OUTPATIENT
Start: 2025-09-04 | End: 2025-09-07 | Stop reason: HOSPADM

## 2025-09-04 RX ORDER — LEVOTHYROXINE SODIUM 100 UG/1
100 TABLET ORAL 2 TIMES WEEKLY
Status: DISCONTINUED | OUTPATIENT
Start: 2025-09-08 | End: 2025-09-07 | Stop reason: HOSPADM

## 2025-09-04 RX ORDER — POLYETHYLENE GLYCOL 3350 17 G/17G
17 POWDER, FOR SOLUTION ORAL DAILY
Status: DISCONTINUED | OUTPATIENT
Start: 2025-09-04 | End: 2025-09-07 | Stop reason: HOSPADM

## 2025-09-04 RX ORDER — BISACODYL 10 MG/1
10 SUPPOSITORY RECTAL DAILY PRN
Status: DISCONTINUED | OUTPATIENT
Start: 2025-09-04 | End: 2025-09-07 | Stop reason: HOSPADM

## 2025-09-04 RX ORDER — FAMOTIDINE 20 MG/1
40 TABLET, FILM COATED ORAL 2 TIMES DAILY
Status: DISCONTINUED | OUTPATIENT
Start: 2025-09-04 | End: 2025-09-07 | Stop reason: HOSPADM

## 2025-09-04 RX ORDER — LIDOCAINE HYDROCHLORIDE 20 MG/ML
INJECTION, SOLUTION EPIDURAL; INFILTRATION; INTRACAUDAL; PERINEURAL AS NEEDED
Status: DISCONTINUED | OUTPATIENT
Start: 2025-09-04 | End: 2025-09-04

## 2025-09-04 RX ORDER — DEXTROSE 50 % IN WATER (D50W) INTRAVENOUS SYRINGE
25
Status: DISCONTINUED | OUTPATIENT
Start: 2025-09-04 | End: 2025-09-07 | Stop reason: HOSPADM

## 2025-09-04 RX ORDER — SODIUM CHLORIDE 0.9 G/100ML
INJECTION, SOLUTION IRRIGATION AS NEEDED
Status: DISCONTINUED | OUTPATIENT
Start: 2025-09-04 | End: 2025-09-04 | Stop reason: HOSPADM

## 2025-09-04 RX ORDER — FENTANYL CITRATE 50 UG/ML
INJECTION, SOLUTION INTRAMUSCULAR; INTRAVENOUS AS NEEDED
Status: DISCONTINUED | OUTPATIENT
Start: 2025-09-04 | End: 2025-09-04

## 2025-09-04 RX ORDER — LOSARTAN POTASSIUM 50 MG/1
50 TABLET ORAL DAILY
Status: DISCONTINUED | OUTPATIENT
Start: 2025-09-05 | End: 2025-09-07 | Stop reason: HOSPADM

## 2025-09-04 RX ORDER — SODIUM CHLORIDE, SODIUM LACTATE, POTASSIUM CHLORIDE, CALCIUM CHLORIDE 600; 310; 30; 20 MG/100ML; MG/100ML; MG/100ML; MG/100ML
100 INJECTION, SOLUTION INTRAVENOUS CONTINUOUS
Status: DISCONTINUED | OUTPATIENT
Start: 2025-09-04 | End: 2025-09-04 | Stop reason: HOSPADM

## 2025-09-04 RX ORDER — ENOXAPARIN SODIUM 100 MG/ML
40 INJECTION SUBCUTANEOUS EVERY 24 HOURS
Status: DISCONTINUED | OUTPATIENT
Start: 2025-09-05 | End: 2025-09-07 | Stop reason: HOSPADM

## 2025-09-04 RX ORDER — AMLODIPINE BESYLATE 10 MG/1
10 TABLET ORAL DAILY
Status: DISCONTINUED | OUTPATIENT
Start: 2025-09-05 | End: 2025-09-07 | Stop reason: HOSPADM

## 2025-09-04 RX ORDER — PHENYLEPHRINE HCL IN 0.9% NACL 1 MG/10 ML
SYRINGE (ML) INTRAVENOUS AS NEEDED
Status: DISCONTINUED | OUTPATIENT
Start: 2025-09-04 | End: 2025-09-04

## 2025-09-04 RX ORDER — OXYCODONE HYDROCHLORIDE 10 MG/1
10 TABLET ORAL EVERY 4 HOURS PRN
Status: DISCONTINUED | OUTPATIENT
Start: 2025-09-04 | End: 2025-09-06

## 2025-09-04 RX ORDER — CEFAZOLIN SODIUM 2 G/100ML
INJECTION, SOLUTION INTRAVENOUS AS NEEDED
Status: DISCONTINUED | OUTPATIENT
Start: 2025-09-04 | End: 2025-09-04

## 2025-09-04 RX ORDER — ONDANSETRON 4 MG/1
4 TABLET, FILM COATED ORAL EVERY 8 HOURS PRN
Status: DISCONTINUED | OUTPATIENT
Start: 2025-09-04 | End: 2025-09-07 | Stop reason: HOSPADM

## 2025-09-04 RX ORDER — ROCURONIUM BROMIDE 50 MG/5 ML
SYRINGE (ML) INTRAVENOUS AS NEEDED
Status: DISCONTINUED | OUTPATIENT
Start: 2025-09-04 | End: 2025-09-04

## 2025-09-04 RX ORDER — ONDANSETRON HYDROCHLORIDE 2 MG/ML
INJECTION, SOLUTION INTRAVENOUS AS NEEDED
Status: DISCONTINUED | OUTPATIENT
Start: 2025-09-04 | End: 2025-09-04

## 2025-09-04 RX ORDER — L. ACIDOPHILUS/L.BULGARICUS 1MM CELL
1 TABLET ORAL DAILY
Status: DISCONTINUED | OUTPATIENT
Start: 2025-09-04 | End: 2025-09-07 | Stop reason: HOSPADM

## 2025-09-04 RX ADMIN — CEFAZOLIN SODIUM 2 G: 2 INJECTION, SOLUTION INTRAVENOUS at 12:59

## 2025-09-04 RX ADMIN — FENTANYL CITRATE 50 MCG: 50 INJECTION, SOLUTION INTRAMUSCULAR; INTRAVENOUS at 13:50

## 2025-09-04 RX ADMIN — ACETAMINOPHEN 975 MG: 325 TABLET ORAL at 10:08

## 2025-09-04 RX ADMIN — GLIMEPIRIDE 1 MG: 1 TABLET ORAL at 22:27

## 2025-09-04 RX ADMIN — ONDANSETRON 4 MG: 2 INJECTION, SOLUTION INTRAMUSCULAR; INTRAVENOUS at 16:22

## 2025-09-04 RX ADMIN — PHENYLEPHRINE HYDROCHLORIDE 100 MCG: 10 INJECTION INTRAVENOUS at 12:33

## 2025-09-04 RX ADMIN — ALBUMIN HUMAN 250 ML: 0.05 INJECTION, SOLUTION INTRAVENOUS at 14:19

## 2025-09-04 RX ADMIN — ASPIRIN 81 MG CHEWABLE TABLET 81 MG: 81 TABLET CHEWABLE at 21:57

## 2025-09-04 RX ADMIN — Medication 1 TABLET: at 21:59

## 2025-09-04 RX ADMIN — GLIMEPIRIDE 4 MG: 1 TABLET ORAL at 22:27

## 2025-09-04 RX ADMIN — METOPROLOL TARTRATE 25 MG: 25 TABLET, FILM COATED ORAL at 21:57

## 2025-09-04 RX ADMIN — Medication 20 MG: at 14:10

## 2025-09-04 RX ADMIN — ACETAMINOPHEN 325MG 650 MG: 325 TABLET ORAL at 21:57

## 2025-09-04 RX ADMIN — CYCLOBENZAPRINE HYDROCHLORIDE 5 MG: 5 TABLET, FILM COATED ORAL at 22:02

## 2025-09-04 RX ADMIN — SODIUM CHLORIDE, SODIUM LACTATE, POTASSIUM CHLORIDE, CALCIUM CHLORIDE: 600; 310; 30; 20 INJECTION, SOLUTION INTRAVENOUS at 12:50

## 2025-09-04 RX ADMIN — PHENYLEPHRINE HYDROCHLORIDE 100 MCG: 10 INJECTION INTRAVENOUS at 12:30

## 2025-09-04 RX ADMIN — Medication 0.2 MCG/KG/MIN: at 13:03

## 2025-09-04 RX ADMIN — POVIDONE-IODINE 1 APPLICATION: 5 SOLUTION TOPICAL at 10:10

## 2025-09-04 RX ADMIN — TRANEXAMIC ACID 1300 MG: 650 TABLET ORAL at 10:08

## 2025-09-04 RX ADMIN — Medication 50 MG: at 12:17

## 2025-09-04 RX ADMIN — Medication 20 MG: at 14:48

## 2025-09-04 RX ADMIN — FENOFIBRATE 54 MG: 54 TABLET ORAL at 22:28

## 2025-09-04 RX ADMIN — Medication 20 MG: at 15:16

## 2025-09-04 RX ADMIN — PHENYLEPHRINE HYDROCHLORIDE 100 MCG: 10 INJECTION INTRAVENOUS at 15:43

## 2025-09-04 RX ADMIN — ALBUMIN HUMAN 250 ML: 0.05 INJECTION, SOLUTION INTRAVENOUS at 15:52

## 2025-09-04 RX ADMIN — Medication 20 MG: at 13:21

## 2025-09-04 RX ADMIN — PHENYLEPHRINE HYDROCHLORIDE 100 MCG: 10 INJECTION INTRAVENOUS at 15:22

## 2025-09-04 RX ADMIN — SUGAMMADEX 200 MG: 100 INJECTION, SOLUTION INTRAVENOUS at 16:57

## 2025-09-04 RX ADMIN — ATORVASTATIN CALCIUM 40 MG: 40 TABLET, FILM COATED ORAL at 21:59

## 2025-09-04 RX ADMIN — PROPOFOL 20 MG: 10 INJECTION, EMULSION INTRAVENOUS at 13:47

## 2025-09-04 RX ADMIN — ESMOLOL HYDROCHLORIDE 20 MG: 10 INJECTION, SOLUTION INTRAVENOUS at 12:48

## 2025-09-04 RX ADMIN — PROPOFOL 30 MG: 10 INJECTION, EMULSION INTRAVENOUS at 12:47

## 2025-09-04 RX ADMIN — Medication 10 MG: at 13:36

## 2025-09-04 RX ADMIN — HYDROMORPHONE HYDROCHLORIDE 1 MG: 1 INJECTION, SOLUTION INTRAMUSCULAR; INTRAVENOUS; SUBCUTANEOUS at 17:28

## 2025-09-04 RX ADMIN — DEXAMETHASONE SODIUM PHOSPHATE 4 MG: 4 INJECTION, SOLUTION INTRAMUSCULAR; INTRAVENOUS at 12:59

## 2025-09-04 RX ADMIN — METHADONE HYDROCHLORIDE 10 MG: 10 TABLET ORAL at 12:09

## 2025-09-04 RX ADMIN — PHENYLEPHRINE HYDROCHLORIDE 100 MCG: 10 INJECTION INTRAVENOUS at 13:08

## 2025-09-04 RX ADMIN — OXYCODONE HYDROCHLORIDE 10 MG: 10 TABLET ORAL at 22:28

## 2025-09-04 RX ADMIN — PHENYLEPHRINE HYDROCHLORIDE 100 MCG: 10 INJECTION INTRAVENOUS at 12:25

## 2025-09-04 RX ADMIN — FAMOTIDINE 40 MG: 20 TABLET, FILM COATED ORAL at 21:57

## 2025-09-04 RX ADMIN — Medication 20 MG: at 12:47

## 2025-09-04 RX ADMIN — ESMOLOL HYDROCHLORIDE 20 MG: 10 INJECTION, SOLUTION INTRAVENOUS at 13:47

## 2025-09-04 RX ADMIN — PHENYLEPHRINE HYDROCHLORIDE 100 MCG: 10 INJECTION INTRAVENOUS at 13:26

## 2025-09-04 RX ADMIN — HYDROMORPHONE HYDROCHLORIDE 1 MG: 1 INJECTION, SOLUTION INTRAMUSCULAR; INTRAVENOUS; SUBCUTANEOUS at 19:11

## 2025-09-04 RX ADMIN — CEFAZOLIN SODIUM 2 G: 2 INJECTION, SOLUTION INTRAVENOUS at 15:44

## 2025-09-04 RX ADMIN — SODIUM CHLORIDE, POTASSIUM CHLORIDE, SODIUM LACTATE AND CALCIUM CHLORIDE: 600; 310; 30; 20 INJECTION, SOLUTION INTRAVENOUS at 12:13

## 2025-09-04 RX ADMIN — Medication 10 MG: at 13:30

## 2025-09-04 RX ADMIN — PROPOFOL 100 MG: 10 INJECTION, EMULSION INTRAVENOUS at 12:17

## 2025-09-04 RX ADMIN — HYDROMORPHONE HYDROCHLORIDE 0.5 MG: 1 INJECTION, SOLUTION INTRAMUSCULAR; INTRAVENOUS; SUBCUTANEOUS at 17:01

## 2025-09-04 RX ADMIN — LIDOCAINE HYDROCHLORIDE 100 MG: 20 INJECTION, SOLUTION EPIDURAL; INFILTRATION; INTRACAUDAL; PERINEURAL at 12:17

## 2025-09-04 RX ADMIN — FENTANYL CITRATE 50 MCG: 50 INJECTION, SOLUTION INTRAMUSCULAR; INTRAVENOUS at 12:11

## 2025-09-04 RX ADMIN — Medication: at 17:05

## 2025-09-04 RX ADMIN — Medication 20 MG: at 15:52

## 2025-09-04 RX ADMIN — CELECOXIB 400 MG: 200 CAPSULE ORAL at 10:08

## 2025-09-04 SDOH — ECONOMIC STABILITY: INCOME INSECURITY: IN THE PAST 12 MONTHS HAS THE ELECTRIC, GAS, OIL, OR WATER COMPANY THREATENED TO SHUT OFF SERVICES IN YOUR HOME?: NO

## 2025-09-04 SDOH — SOCIAL STABILITY: SOCIAL INSECURITY: DO YOU FEEL ANYONE HAS EXPLOITED OR TAKEN ADVANTAGE OF YOU FINANCIALLY OR OF YOUR PERSONAL PROPERTY?: NO

## 2025-09-04 SDOH — ECONOMIC STABILITY: HOUSING INSECURITY: IN THE LAST 12 MONTHS, WAS THERE A TIME WHEN YOU WERE NOT ABLE TO PAY THE MORTGAGE OR RENT ON TIME?: NO

## 2025-09-04 SDOH — SOCIAL STABILITY: SOCIAL INSECURITY: WITHIN THE LAST YEAR, HAVE YOU BEEN AFRAID OF YOUR PARTNER OR EX-PARTNER?: NO

## 2025-09-04 SDOH — SOCIAL STABILITY: SOCIAL INSECURITY: HAVE YOU HAD THOUGHTS OF HARMING ANYONE ELSE?: NO

## 2025-09-04 SDOH — SOCIAL STABILITY: SOCIAL INSECURITY: ABUSE: ADULT

## 2025-09-04 SDOH — ECONOMIC STABILITY: FOOD INSECURITY: WITHIN THE PAST 12 MONTHS, YOU WORRIED THAT YOUR FOOD WOULD RUN OUT BEFORE YOU GOT THE MONEY TO BUY MORE.: NEVER TRUE

## 2025-09-04 SDOH — ECONOMIC STABILITY: FOOD INSECURITY: WITHIN THE PAST 12 MONTHS, THE FOOD YOU BOUGHT JUST DIDN'T LAST AND YOU DIDN'T HAVE MONEY TO GET MORE.: NEVER TRUE

## 2025-09-04 SDOH — SOCIAL STABILITY: SOCIAL INSECURITY: WITHIN THE LAST YEAR, HAVE YOU BEEN HUMILIATED OR EMOTIONALLY ABUSED IN OTHER WAYS BY YOUR PARTNER OR EX-PARTNER?: NO

## 2025-09-04 SDOH — SOCIAL STABILITY: SOCIAL INSECURITY: DOES ANYONE TRY TO KEEP YOU FROM HAVING/CONTACTING OTHER FRIENDS OR DOING THINGS OUTSIDE YOUR HOME?: NO

## 2025-09-04 SDOH — ECONOMIC STABILITY: HOUSING INSECURITY: AT ANY TIME IN THE PAST 12 MONTHS, WERE YOU HOMELESS OR LIVING IN A SHELTER (INCLUDING NOW)?: NO

## 2025-09-04 SDOH — ECONOMIC STABILITY: FOOD INSECURITY: HOW HARD IS IT FOR YOU TO PAY FOR THE VERY BASICS LIKE FOOD, HOUSING, MEDICAL CARE, AND HEATING?: NOT HARD AT ALL

## 2025-09-04 SDOH — ECONOMIC STABILITY: HOUSING INSECURITY: IN THE PAST 12 MONTHS, HOW MANY TIMES HAVE YOU MOVED WHERE YOU WERE LIVING?: 0

## 2025-09-04 SDOH — ECONOMIC STABILITY: TRANSPORTATION INSECURITY: IN THE PAST 12 MONTHS, HAS LACK OF TRANSPORTATION KEPT YOU FROM MEDICAL APPOINTMENTS OR FROM GETTING MEDICATIONS?: NO

## 2025-09-04 SDOH — SOCIAL STABILITY: SOCIAL INSECURITY: HAS ANYONE EVER THREATENED TO HURT YOUR FAMILY OR YOUR PETS?: NO

## 2025-09-04 SDOH — SOCIAL STABILITY: SOCIAL INSECURITY: DO YOU FEEL UNSAFE GOING BACK TO THE PLACE WHERE YOU ARE LIVING?: NO

## 2025-09-04 SDOH — HEALTH STABILITY: MENTAL HEALTH: CURRENT SMOKER: 0

## 2025-09-04 SDOH — SOCIAL STABILITY: SOCIAL INSECURITY: HAVE YOU HAD ANY THOUGHTS OF HARMING ANYONE ELSE?: NO

## 2025-09-04 SDOH — SOCIAL STABILITY: SOCIAL INSECURITY: WERE YOU ABLE TO COMPLETE ALL THE BEHAVIORAL HEALTH SCREENINGS?: YES

## 2025-09-04 SDOH — SOCIAL STABILITY: SOCIAL INSECURITY: ARE YOU OR HAVE YOU BEEN THREATENED OR ABUSED PHYSICALLY, EMOTIONALLY, OR SEXUALLY BY ANYONE?: NO

## 2025-09-04 SDOH — SOCIAL STABILITY: SOCIAL INSECURITY: ARE THERE ANY APPARENT SIGNS OF INJURIES/BEHAVIORS THAT COULD BE RELATED TO ABUSE/NEGLECT?: NO

## 2025-09-04 ASSESSMENT — COGNITIVE AND FUNCTIONAL STATUS - GENERAL
TURNING FROM BACK TO SIDE WHILE IN FLAT BAD: A LITTLE
DAILY ACTIVITIY SCORE: 20
MOBILITY SCORE: 18
MOVING TO AND FROM BED TO CHAIR: A LITTLE
PERSONAL GROOMING: A LITTLE
MOVING FROM LYING ON BACK TO SITTING ON SIDE OF FLAT BED WITH BEDRAILS: A LITTLE
HELP NEEDED FOR BATHING: A LITTLE
TOILETING: A LITTLE
STANDING UP FROM CHAIR USING ARMS: A LITTLE
DRESSING REGULAR LOWER BODY CLOTHING: A LITTLE
CLIMB 3 TO 5 STEPS WITH RAILING: A LITTLE
PATIENT BASELINE BEDBOUND: NO
WALKING IN HOSPITAL ROOM: A LITTLE

## 2025-09-04 ASSESSMENT — PAIN SCALES - GENERAL
PAINLEVEL_OUTOF10: 4
PAINLEVEL_OUTOF10: 7
PAINLEVEL_OUTOF10: 7
PAINLEVEL_OUTOF10: 2
PAINLEVEL_OUTOF10: 9
PAINLEVEL_OUTOF10: 4
PAINLEVEL_OUTOF10: 9
PAINLEVEL_OUTOF10: 2
PAINLEVEL_OUTOF10: 4
PAINLEVEL_OUTOF10: 2
PAINLEVEL_OUTOF10: 2

## 2025-09-04 ASSESSMENT — ACTIVITIES OF DAILY LIVING (ADL)
BATHING: INDEPENDENT
LACK_OF_TRANSPORTATION: NO
JUDGMENT_ADEQUATE_SAFELY_COMPLETE_DAILY_ACTIVITIES: YES
LACK_OF_TRANSPORTATION: NO
HEARING - LEFT EAR: FUNCTIONAL
DRESSING YOURSELF: INDEPENDENT
ADEQUATE_TO_COMPLETE_ADL: YES
PATIENT'S MEMORY ADEQUATE TO SAFELY COMPLETE DAILY ACTIVITIES?: YES
ASSISTIVE_DEVICE: WALKER
WALKS IN HOME: INDEPENDENT
HEARING - RIGHT EAR: FUNCTIONAL
GROOMING: INDEPENDENT
TOILETING: INDEPENDENT
FEEDING YOURSELF: INDEPENDENT

## 2025-09-04 ASSESSMENT — PAIN - FUNCTIONAL ASSESSMENT
PAIN_FUNCTIONAL_ASSESSMENT: 0-10
PAIN_FUNCTIONAL_ASSESSMENT: 0-10
PAIN_FUNCTIONAL_ASSESSMENT: WONG-BAKER FACES
PAIN_FUNCTIONAL_ASSESSMENT: 0-10
PAIN_FUNCTIONAL_ASSESSMENT: WONG-BAKER FACES
PAIN_FUNCTIONAL_ASSESSMENT: 0-10
PAIN_FUNCTIONAL_ASSESSMENT: 0-10
PAIN_FUNCTIONAL_ASSESSMENT: WONG-BAKER FACES
PAIN_FUNCTIONAL_ASSESSMENT: 0-10
PAIN_FUNCTIONAL_ASSESSMENT: 0-10
PAIN_FUNCTIONAL_ASSESSMENT: WONG-BAKER FACES
PAIN_FUNCTIONAL_ASSESSMENT: 0-10
PAIN_FUNCTIONAL_ASSESSMENT: WONG-BAKER FACES
PAIN_FUNCTIONAL_ASSESSMENT: 0-10

## 2025-09-04 ASSESSMENT — LIFESTYLE VARIABLES
HOW MANY STANDARD DRINKS CONTAINING ALCOHOL DO YOU HAVE ON A TYPICAL DAY: PATIENT DOES NOT DRINK
AUDIT-C TOTAL SCORE: 0
HOW OFTEN DO YOU HAVE 6 OR MORE DRINKS ON ONE OCCASION: NEVER
AUDIT-C TOTAL SCORE: 0
SUBSTANCE_ABUSE_PAST_12_MONTHS: NO
HOW OFTEN DO YOU HAVE A DRINK CONTAINING ALCOHOL: NEVER
SKIP TO QUESTIONS 9-10: 1
PRESCIPTION_ABUSE_PAST_12_MONTHS: NO

## 2025-09-04 ASSESSMENT — PATIENT HEALTH QUESTIONNAIRE - PHQ9
SUM OF ALL RESPONSES TO PHQ9 QUESTIONS 1 & 2: 0
1. LITTLE INTEREST OR PLEASURE IN DOING THINGS: NOT AT ALL
2. FEELING DOWN, DEPRESSED OR HOPELESS: NOT AT ALL

## 2025-09-04 ASSESSMENT — PAIN SCALES - WONG BAKER
WONGBAKER_NUMERICALRESPONSE: NO HURT
WONGBAKER_NUMERICALRESPONSE: HURTS WHOLE LOT
WONGBAKER_NUMERICALRESPONSE: HURTS LITTLE MORE

## 2025-09-05 LAB
ANION GAP SERPL CALC-SCNC: 14 MMOL/L (ref 10–20)
BUN SERPL-MCNC: 25 MG/DL (ref 6–23)
CALCIUM SERPL-MCNC: 8.9 MG/DL (ref 8.6–10.3)
CHLORIDE SERPL-SCNC: 101 MMOL/L (ref 98–107)
CO2 SERPL-SCNC: 27 MMOL/L (ref 21–32)
CREAT SERPL-MCNC: 1.11 MG/DL (ref 0.5–1.3)
EGFRCR SERPLBLD CKD-EPI 2021: 66 ML/MIN/1.73M*2
ERYTHROCYTE [DISTWIDTH] IN BLOOD BY AUTOMATED COUNT: 13.7 % (ref 11.5–14.5)
GLUCOSE BLD MANUAL STRIP-MCNC: 138 MG/DL (ref 74–99)
GLUCOSE BLD MANUAL STRIP-MCNC: 208 MG/DL (ref 74–99)
GLUCOSE BLD MANUAL STRIP-MCNC: 217 MG/DL (ref 74–99)
GLUCOSE BLD MANUAL STRIP-MCNC: 229 MG/DL (ref 74–99)
GLUCOSE SERPL-MCNC: 149 MG/DL (ref 74–99)
HCT VFR BLD AUTO: 35.9 % (ref 41–52)
HGB BLD-MCNC: 12 G/DL (ref 13.5–17.5)
MCH RBC QN AUTO: 33.3 PG (ref 26–34)
MCHC RBC AUTO-ENTMCNC: 33.4 G/DL (ref 32–36)
MCV RBC AUTO: 100 FL (ref 80–100)
NRBC BLD-RTO: 0 /100 WBCS (ref 0–0)
PLATELET # BLD AUTO: 155 X10*3/UL (ref 150–450)
POTASSIUM SERPL-SCNC: 4 MMOL/L (ref 3.5–5.3)
RBC # BLD AUTO: 3.6 X10*6/UL (ref 4.5–5.9)
SODIUM SERPL-SCNC: 138 MMOL/L (ref 136–145)
WBC # BLD AUTO: 12.8 X10*3/UL (ref 4.4–11.3)

## 2025-09-05 PROCEDURE — 96372 THER/PROPH/DIAG INJ SC/IM: CPT | Performed by: STUDENT IN AN ORGANIZED HEALTH CARE EDUCATION/TRAINING PROGRAM

## 2025-09-05 PROCEDURE — 7100000011 HC EXTENDED STAY RECOVERY HOURLY - NURSING UNIT

## 2025-09-05 PROCEDURE — 97165 OT EVAL LOW COMPLEX 30 MIN: CPT | Mod: GO | Performed by: OCCUPATIONAL THERAPIST

## 2025-09-05 PROCEDURE — 2500000001 HC RX 250 WO HCPCS SELF ADMINISTERED DRUGS (ALT 637 FOR MEDICARE OP): Performed by: STUDENT IN AN ORGANIZED HEALTH CARE EDUCATION/TRAINING PROGRAM

## 2025-09-05 PROCEDURE — 85027 COMPLETE CBC AUTOMATED: CPT | Performed by: STUDENT IN AN ORGANIZED HEALTH CARE EDUCATION/TRAINING PROGRAM

## 2025-09-05 PROCEDURE — 2500000002 HC RX 250 W HCPCS SELF ADMINISTERED DRUGS (ALT 637 FOR MEDICARE OP, ALT 636 FOR OP/ED): Performed by: STUDENT IN AN ORGANIZED HEALTH CARE EDUCATION/TRAINING PROGRAM

## 2025-09-05 PROCEDURE — 72100 X-RAY EXAM L-S SPINE 2/3 VWS: CPT | Performed by: RADIOLOGY

## 2025-09-05 PROCEDURE — 36415 COLL VENOUS BLD VENIPUNCTURE: CPT | Performed by: STUDENT IN AN ORGANIZED HEALTH CARE EDUCATION/TRAINING PROGRAM

## 2025-09-05 PROCEDURE — 97161 PT EVAL LOW COMPLEX 20 MIN: CPT | Mod: GP

## 2025-09-05 PROCEDURE — 80048 BASIC METABOLIC PNL TOTAL CA: CPT | Performed by: STUDENT IN AN ORGANIZED HEALTH CARE EDUCATION/TRAINING PROGRAM

## 2025-09-05 PROCEDURE — 82947 ASSAY GLUCOSE BLOOD QUANT: CPT

## 2025-09-05 PROCEDURE — 2500000004 HC RX 250 GENERAL PHARMACY W/ HCPCS (ALT 636 FOR OP/ED): Performed by: STUDENT IN AN ORGANIZED HEALTH CARE EDUCATION/TRAINING PROGRAM

## 2025-09-05 RX ORDER — CYCLOBENZAPRINE HCL 5 MG
5 TABLET ORAL 3 TIMES DAILY PRN
Qty: 30 TABLET | Refills: 0 | Status: SHIPPED | OUTPATIENT
Start: 2025-09-05 | End: 2025-09-15

## 2025-09-05 RX ORDER — OXYCODONE HYDROCHLORIDE 5 MG/1
5 TABLET ORAL EVERY 6 HOURS PRN
Qty: 28 TABLET | Refills: 0 | Status: SHIPPED | OUTPATIENT
Start: 2025-09-05 | End: 2025-09-07

## 2025-09-05 RX ORDER — ADHESIVE BANDAGE
30 BANDAGE TOPICAL 2 TIMES DAILY PRN
Status: DISCONTINUED | OUTPATIENT
Start: 2025-09-05 | End: 2025-09-07 | Stop reason: HOSPADM

## 2025-09-05 RX ADMIN — FAMOTIDINE 40 MG: 20 TABLET, FILM COATED ORAL at 21:14

## 2025-09-05 RX ADMIN — FENOFIBRATE 54 MG: 54 TABLET ORAL at 08:50

## 2025-09-05 RX ADMIN — BENZOCAINE AND MENTHOL 1 LOZENGE: 15; 3.6 LOZENGE ORAL at 04:00

## 2025-09-05 RX ADMIN — POTASSIUM CHLORIDE EXTENDED-RELEASE 20 MEQ: 1500 TABLET ORAL at 21:14

## 2025-09-05 RX ADMIN — OXYCODONE HYDROCHLORIDE 10 MG: 10 TABLET ORAL at 14:59

## 2025-09-05 RX ADMIN — OXYCODONE HYDROCHLORIDE 10 MG: 10 TABLET ORAL at 23:36

## 2025-09-05 RX ADMIN — Medication 1 TABLET: at 08:49

## 2025-09-05 RX ADMIN — ATORVASTATIN CALCIUM 40 MG: 40 TABLET, FILM COATED ORAL at 21:14

## 2025-09-05 RX ADMIN — OXYCODONE HYDROCHLORIDE 10 MG: 10 TABLET ORAL at 04:00

## 2025-09-05 RX ADMIN — ACETAMINOPHEN 325MG 650 MG: 325 TABLET ORAL at 14:59

## 2025-09-05 RX ADMIN — CYCLOBENZAPRINE HYDROCHLORIDE 5 MG: 5 TABLET, FILM COATED ORAL at 16:10

## 2025-09-05 RX ADMIN — OXYCODONE HYDROCHLORIDE 10 MG: 10 TABLET ORAL at 19:20

## 2025-09-05 RX ADMIN — ACETAMINOPHEN 325MG 650 MG: 325 TABLET ORAL at 21:15

## 2025-09-05 RX ADMIN — ACETAMINOPHEN 325MG 650 MG: 325 TABLET ORAL at 08:47

## 2025-09-05 RX ADMIN — BENZOCAINE AND MENTHOL 1 LOZENGE: 15; 3.6 LOZENGE ORAL at 23:45

## 2025-09-05 RX ADMIN — SENNOSIDES AND DOCUSATE SODIUM 2 TABLET: 50; 8.6 TABLET ORAL at 08:47

## 2025-09-05 RX ADMIN — ICOSAPENT ETHYL 2 G: 1 CAPSULE ORAL at 08:47

## 2025-09-05 RX ADMIN — ICOSAPENT ETHYL 2 G: 1 CAPSULE ORAL at 16:10

## 2025-09-05 RX ADMIN — ASPIRIN 81 MG CHEWABLE TABLET 81 MG: 81 TABLET CHEWABLE at 08:49

## 2025-09-05 RX ADMIN — METOPROLOL TARTRATE 25 MG: 25 TABLET, FILM COATED ORAL at 21:15

## 2025-09-05 RX ADMIN — INSULIN LISPRO 2 UNITS: 100 INJECTION, SOLUTION INTRAVENOUS; SUBCUTANEOUS at 18:31

## 2025-09-05 RX ADMIN — FINASTERIDE 5 MG: 5 TABLET, FILM COATED ORAL at 08:49

## 2025-09-05 RX ADMIN — OXYCODONE HYDROCHLORIDE 10 MG: 10 TABLET ORAL at 08:50

## 2025-09-05 RX ADMIN — GLIMEPIRIDE 4 MG: 1 TABLET ORAL at 21:13

## 2025-09-05 RX ADMIN — FAMOTIDINE 40 MG: 20 TABLET, FILM COATED ORAL at 08:47

## 2025-09-05 RX ADMIN — GLIMEPIRIDE 1 MG: 1 TABLET ORAL at 21:15

## 2025-09-05 RX ADMIN — ENOXAPARIN SODIUM 40 MG: 100 INJECTION SUBCUTANEOUS at 08:50

## 2025-09-05 RX ADMIN — METOPROLOL TARTRATE 25 MG: 25 TABLET, FILM COATED ORAL at 08:50

## 2025-09-05 RX ADMIN — SENNOSIDES AND DOCUSATE SODIUM 2 TABLET: 50; 8.6 TABLET ORAL at 21:14

## 2025-09-05 RX ADMIN — OXYCODONE HYDROCHLORIDE AND ACETAMINOPHEN 500 MG: 500 TABLET ORAL at 08:49

## 2025-09-05 RX ADMIN — INSULIN LISPRO 2 UNITS: 100 INJECTION, SOLUTION INTRAVENOUS; SUBCUTANEOUS at 12:47

## 2025-09-05 RX ADMIN — AMLODIPINE BESYLATE 10 MG: 10 TABLET ORAL at 08:50

## 2025-09-05 RX ADMIN — POTASSIUM CHLORIDE EXTENDED-RELEASE 20 MEQ: 1500 TABLET ORAL at 08:49

## 2025-09-05 ASSESSMENT — COGNITIVE AND FUNCTIONAL STATUS - GENERAL
DAILY ACTIVITIY SCORE: 19
MOVING TO AND FROM BED TO CHAIR: A LITTLE
MOBILITY SCORE: 18
TURNING FROM BACK TO SIDE WHILE IN FLAT BAD: A LITTLE
STANDING UP FROM CHAIR USING ARMS: A LITTLE
DRESSING REGULAR UPPER BODY CLOTHING: A LITTLE
TOILETING: A LITTLE
MOVING FROM LYING ON BACK TO SITTING ON SIDE OF FLAT BED WITH BEDRAILS: A LITTLE
STANDING UP FROM CHAIR USING ARMS: A LITTLE
MOBILITY SCORE: 18
DRESSING REGULAR UPPER BODY CLOTHING: A LITTLE
DRESSING REGULAR LOWER BODY CLOTHING: A LOT
HELP NEEDED FOR BATHING: A LOT
TOILETING: A LITTLE
PERSONAL GROOMING: A LITTLE
HELP NEEDED FOR BATHING: A LITTLE
WALKING IN HOSPITAL ROOM: A LITTLE
WALKING IN HOSPITAL ROOM: A LITTLE
CLIMB 3 TO 5 STEPS WITH RAILING: A LITTLE
DAILY ACTIVITIY SCORE: 17
PERSONAL GROOMING: A LITTLE
DRESSING REGULAR LOWER BODY CLOTHING: A LITTLE
MOVING FROM LYING ON BACK TO SITTING ON SIDE OF FLAT BED WITH BEDRAILS: A LITTLE
TURNING FROM BACK TO SIDE WHILE IN FLAT BAD: A LITTLE
CLIMB 3 TO 5 STEPS WITH RAILING: A LITTLE
MOVING TO AND FROM BED TO CHAIR: A LITTLE

## 2025-09-05 ASSESSMENT — PAIN SCALES - GENERAL
PAINLEVEL_OUTOF10: 6
PAINLEVEL_OUTOF10: 7
PAINLEVEL_OUTOF10: 6
PAINLEVEL_OUTOF10: 4
PAINLEVEL_OUTOF10: 7
PAINLEVEL_OUTOF10: 9
PAINLEVEL_OUTOF10: 8
PAINLEVEL_OUTOF10: 7
PAINLEVEL_OUTOF10: 9
PAINLEVEL_OUTOF10: 3

## 2025-09-05 ASSESSMENT — PAIN - FUNCTIONAL ASSESSMENT
PAIN_FUNCTIONAL_ASSESSMENT: 0-10

## 2025-09-05 ASSESSMENT — ACTIVITIES OF DAILY LIVING (ADL)
ADL_ASSISTANCE: INDEPENDENT
ADL_ASSISTANCE: INDEPENDENT

## 2025-09-05 ASSESSMENT — PAIN DESCRIPTION - ORIENTATION
ORIENTATION: LOWER
ORIENTATION: LOWER

## 2025-09-05 ASSESSMENT — PAIN DESCRIPTION - LOCATION
LOCATION: BACK

## 2025-09-05 ASSESSMENT — PAIN DESCRIPTION - DESCRIPTORS: DESCRIPTORS: SHOOTING

## 2025-09-06 LAB
ANION GAP SERPL CALC-SCNC: 11 MMOL/L (ref 10–20)
BUN SERPL-MCNC: 24 MG/DL (ref 6–23)
CALCIUM SERPL-MCNC: 9.3 MG/DL (ref 8.6–10.3)
CHLORIDE SERPL-SCNC: 101 MMOL/L (ref 98–107)
CO2 SERPL-SCNC: 29 MMOL/L (ref 21–32)
CREAT SERPL-MCNC: 1.13 MG/DL (ref 0.5–1.3)
EGFRCR SERPLBLD CKD-EPI 2021: 65 ML/MIN/1.73M*2
ERYTHROCYTE [DISTWIDTH] IN BLOOD BY AUTOMATED COUNT: 14.1 % (ref 11.5–14.5)
GLUCOSE BLD MANUAL STRIP-MCNC: 150 MG/DL (ref 74–99)
GLUCOSE BLD MANUAL STRIP-MCNC: 155 MG/DL (ref 74–99)
GLUCOSE BLD MANUAL STRIP-MCNC: 174 MG/DL (ref 74–99)
GLUCOSE BLD MANUAL STRIP-MCNC: 213 MG/DL (ref 74–99)
GLUCOSE BLD MANUAL STRIP-MCNC: 269 MG/DL (ref 74–99)
GLUCOSE SERPL-MCNC: 176 MG/DL (ref 74–99)
HCT VFR BLD AUTO: 37.6 % (ref 41–52)
HGB BLD-MCNC: 11.8 G/DL (ref 13.5–17.5)
MCH RBC QN AUTO: 32.2 PG (ref 26–34)
MCHC RBC AUTO-ENTMCNC: 31.4 G/DL (ref 32–36)
MCV RBC AUTO: 103 FL (ref 80–100)
NRBC BLD-RTO: 0 /100 WBCS (ref 0–0)
PLATELET # BLD AUTO: 134 X10*3/UL (ref 150–450)
POTASSIUM SERPL-SCNC: 4 MMOL/L (ref 3.5–5.3)
RBC # BLD AUTO: 3.67 X10*6/UL (ref 4.5–5.9)
SODIUM SERPL-SCNC: 137 MMOL/L (ref 136–145)
WBC # BLD AUTO: 12.6 X10*3/UL (ref 4.4–11.3)

## 2025-09-06 PROCEDURE — 82947 ASSAY GLUCOSE BLOOD QUANT: CPT

## 2025-09-06 PROCEDURE — 36415 COLL VENOUS BLD VENIPUNCTURE: CPT | Performed by: STUDENT IN AN ORGANIZED HEALTH CARE EDUCATION/TRAINING PROGRAM

## 2025-09-06 PROCEDURE — 85027 COMPLETE CBC AUTOMATED: CPT | Performed by: STUDENT IN AN ORGANIZED HEALTH CARE EDUCATION/TRAINING PROGRAM

## 2025-09-06 PROCEDURE — 97535 SELF CARE MNGMENT TRAINING: CPT | Mod: GO,CO

## 2025-09-06 PROCEDURE — 1100000001 HC PRIVATE ROOM DAILY

## 2025-09-06 PROCEDURE — 97530 THERAPEUTIC ACTIVITIES: CPT | Mod: GO,CO

## 2025-09-06 PROCEDURE — 80048 BASIC METABOLIC PNL TOTAL CA: CPT | Performed by: STUDENT IN AN ORGANIZED HEALTH CARE EDUCATION/TRAINING PROGRAM

## 2025-09-06 PROCEDURE — 2500000002 HC RX 250 W HCPCS SELF ADMINISTERED DRUGS (ALT 637 FOR MEDICARE OP, ALT 636 FOR OP/ED): Performed by: STUDENT IN AN ORGANIZED HEALTH CARE EDUCATION/TRAINING PROGRAM

## 2025-09-06 PROCEDURE — 2500000001 HC RX 250 WO HCPCS SELF ADMINISTERED DRUGS (ALT 637 FOR MEDICARE OP): Performed by: STUDENT IN AN ORGANIZED HEALTH CARE EDUCATION/TRAINING PROGRAM

## 2025-09-06 PROCEDURE — 96372 THER/PROPH/DIAG INJ SC/IM: CPT | Performed by: STUDENT IN AN ORGANIZED HEALTH CARE EDUCATION/TRAINING PROGRAM

## 2025-09-06 PROCEDURE — 2500000004 HC RX 250 GENERAL PHARMACY W/ HCPCS (ALT 636 FOR OP/ED): Performed by: STUDENT IN AN ORGANIZED HEALTH CARE EDUCATION/TRAINING PROGRAM

## 2025-09-06 RX ORDER — CALCIUM CARBONATE 200(500)MG
500 TABLET,CHEWABLE ORAL 4 TIMES DAILY PRN
Status: DISCONTINUED | OUTPATIENT
Start: 2025-09-06 | End: 2025-09-07 | Stop reason: HOSPADM

## 2025-09-06 RX ORDER — OXYCODONE HYDROCHLORIDE 5 MG/1
5 TABLET ORAL EVERY 4 HOURS PRN
Refills: 0 | Status: DISCONTINUED | OUTPATIENT
Start: 2025-09-06 | End: 2025-09-07 | Stop reason: HOSPADM

## 2025-09-06 RX ORDER — LIDOCAINE 560 MG/1
2 PATCH PERCUTANEOUS; TOPICAL; TRANSDERMAL DAILY
Status: CANCELLED | OUTPATIENT
Start: 2025-09-06

## 2025-09-06 RX ORDER — CYCLOBENZAPRINE HCL 5 MG
5 TABLET ORAL 3 TIMES DAILY
Status: DISCONTINUED | OUTPATIENT
Start: 2025-09-06 | End: 2025-09-07 | Stop reason: HOSPADM

## 2025-09-06 RX ADMIN — CYCLOBENZAPRINE HYDROCHLORIDE 5 MG: 5 TABLET, FILM COATED ORAL at 20:52

## 2025-09-06 RX ADMIN — OXYCODONE HYDROCHLORIDE 5 MG: 5 TABLET ORAL at 20:52

## 2025-09-06 RX ADMIN — ICOSAPENT ETHYL 2 G: 1 CAPSULE ORAL at 08:22

## 2025-09-06 RX ADMIN — FAMOTIDINE 40 MG: 20 TABLET, FILM COATED ORAL at 08:20

## 2025-09-06 RX ADMIN — GLIMEPIRIDE 4 MG: 1 TABLET ORAL at 20:54

## 2025-09-06 RX ADMIN — ACETAMINOPHEN 325MG 650 MG: 325 TABLET ORAL at 08:21

## 2025-09-06 RX ADMIN — ASPIRIN 81 MG CHEWABLE TABLET 81 MG: 81 TABLET CHEWABLE at 08:22

## 2025-09-06 RX ADMIN — SENNOSIDES AND DOCUSATE SODIUM 2 TABLET: 50; 8.6 TABLET ORAL at 08:21

## 2025-09-06 RX ADMIN — METOPROLOL TARTRATE 25 MG: 25 TABLET, FILM COATED ORAL at 20:52

## 2025-09-06 RX ADMIN — CYCLOBENZAPRINE HYDROCHLORIDE 5 MG: 5 TABLET, FILM COATED ORAL at 14:24

## 2025-09-06 RX ADMIN — GLIMEPIRIDE 1 MG: 1 TABLET ORAL at 20:53

## 2025-09-06 RX ADMIN — CYCLOBENZAPRINE HYDROCHLORIDE 5 MG: 5 TABLET, FILM COATED ORAL at 08:41

## 2025-09-06 RX ADMIN — OXYCODONE HYDROCHLORIDE AND ACETAMINOPHEN 500 MG: 500 TABLET ORAL at 08:22

## 2025-09-06 RX ADMIN — POTASSIUM CHLORIDE EXTENDED-RELEASE 20 MEQ: 1500 TABLET ORAL at 20:52

## 2025-09-06 RX ADMIN — OXYCODONE HYDROCHLORIDE 10 MG: 10 TABLET ORAL at 03:51

## 2025-09-06 RX ADMIN — ICOSAPENT ETHYL 2 G: 1 CAPSULE ORAL at 16:50

## 2025-09-06 RX ADMIN — INSULIN LISPRO 3 UNITS: 100 INJECTION, SOLUTION INTRAVENOUS; SUBCUTANEOUS at 11:46

## 2025-09-06 RX ADMIN — METOPROLOL TARTRATE 25 MG: 25 TABLET, FILM COATED ORAL at 08:22

## 2025-09-06 RX ADMIN — FAMOTIDINE 40 MG: 20 TABLET, FILM COATED ORAL at 20:52

## 2025-09-06 RX ADMIN — ACETAMINOPHEN 325MG 650 MG: 325 TABLET ORAL at 14:24

## 2025-09-06 RX ADMIN — AMLODIPINE BESYLATE 10 MG: 10 TABLET ORAL at 08:21

## 2025-09-06 RX ADMIN — SENNOSIDES AND DOCUSATE SODIUM 2 TABLET: 50; 8.6 TABLET ORAL at 20:53

## 2025-09-06 RX ADMIN — POTASSIUM CHLORIDE EXTENDED-RELEASE 20 MEQ: 1500 TABLET ORAL at 08:21

## 2025-09-06 RX ADMIN — INSULIN LISPRO 1 UNITS: 100 INJECTION, SOLUTION INTRAVENOUS; SUBCUTANEOUS at 16:50

## 2025-09-06 RX ADMIN — ENOXAPARIN SODIUM 40 MG: 100 INJECTION SUBCUTANEOUS at 08:22

## 2025-09-06 RX ADMIN — LEVOTHYROXINE SODIUM 50 MCG: 0.05 TABLET ORAL at 08:21

## 2025-09-06 RX ADMIN — OXYCODONE HYDROCHLORIDE 5 MG: 5 TABLET ORAL at 16:44

## 2025-09-06 RX ADMIN — ACETAMINOPHEN 325MG 650 MG: 325 TABLET ORAL at 20:52

## 2025-09-06 RX ADMIN — OXYCODONE HYDROCHLORIDE 5 MG: 5 TABLET ORAL at 12:34

## 2025-09-06 RX ADMIN — Medication 1 TABLET: at 08:21

## 2025-09-06 RX ADMIN — FINASTERIDE 5 MG: 5 TABLET, FILM COATED ORAL at 08:22

## 2025-09-06 RX ADMIN — ATORVASTATIN CALCIUM 40 MG: 40 TABLET, FILM COATED ORAL at 20:52

## 2025-09-06 RX ADMIN — FENOFIBRATE 54 MG: 54 TABLET ORAL at 08:41

## 2025-09-06 RX ADMIN — OXYCODONE HYDROCHLORIDE 10 MG: 10 TABLET ORAL at 08:19

## 2025-09-06 RX ADMIN — POLYETHYLENE GLYCOL 3350 17 G: 17 POWDER, FOR SOLUTION ORAL at 08:22

## 2025-09-06 RX ADMIN — ACETAMINOPHEN 325MG 650 MG: 325 TABLET ORAL at 03:51

## 2025-09-06 ASSESSMENT — COGNITIVE AND FUNCTIONAL STATUS - GENERAL
HELP NEEDED FOR BATHING: A LITTLE
WALKING IN HOSPITAL ROOM: A LITTLE
MOBILITY SCORE: 18
DAILY ACTIVITIY SCORE: 17
CLIMB 3 TO 5 STEPS WITH RAILING: A LITTLE
PERSONAL GROOMING: A LITTLE
MOVING FROM LYING ON BACK TO SITTING ON SIDE OF FLAT BED WITH BEDRAILS: A LITTLE
TOILETING: A LITTLE
DRESSING REGULAR UPPER BODY CLOTHING: A LITTLE
DRESSING REGULAR UPPER BODY CLOTHING: A LITTLE
TOILETING: A LOT
TURNING FROM BACK TO SIDE WHILE IN FLAT BAD: A LITTLE
DRESSING REGULAR LOWER BODY CLOTHING: A LOT
MOVING TO AND FROM BED TO CHAIR: A LITTLE
PERSONAL GROOMING: A LITTLE
STANDING UP FROM CHAIR USING ARMS: A LITTLE
HELP NEEDED FOR BATHING: A LOT
DAILY ACTIVITIY SCORE: 17
DRESSING REGULAR LOWER BODY CLOTHING: A LOT

## 2025-09-06 ASSESSMENT — PAIN SCALES - GENERAL
PAINLEVEL_OUTOF10: 7
PAINLEVEL_OUTOF10: 8
PAINLEVEL_OUTOF10: 7
PAINLEVEL_OUTOF10: 10 - WORST POSSIBLE PAIN
PAINLEVEL_OUTOF10: 3
PAINLEVEL_OUTOF10: 9
PAINLEVEL_OUTOF10: 0 - NO PAIN
PAINLEVEL_OUTOF10: 9
PAINLEVEL_OUTOF10: 5 - MODERATE PAIN
PAINLEVEL_OUTOF10: 8
PAINLEVEL_OUTOF10: 1

## 2025-09-06 ASSESSMENT — PAIN DESCRIPTION - LOCATION
LOCATION: BACK
LOCATION: BUTTOCKS
LOCATION: BACK

## 2025-09-06 ASSESSMENT — PAIN - FUNCTIONAL ASSESSMENT
PAIN_FUNCTIONAL_ASSESSMENT: 0-10

## 2025-09-06 ASSESSMENT — PAIN DESCRIPTION - ORIENTATION
ORIENTATION: RIGHT
ORIENTATION: LEFT
ORIENTATION: LOWER

## 2025-09-06 ASSESSMENT — ACTIVITIES OF DAILY LIVING (ADL): HOME_MANAGEMENT_TIME_ENTRY: 10

## 2025-09-07 ENCOUNTER — DOCUMENTATION (OUTPATIENT)
Dept: HOME HEALTH SERVICES | Facility: HOME HEALTH | Age: 85
End: 2025-09-07
Payer: MEDICARE

## 2025-09-07 VITALS
BODY MASS INDEX: 27.74 KG/M2 | DIASTOLIC BLOOD PRESSURE: 63 MMHG | HEIGHT: 68 IN | WEIGHT: 183 LBS | SYSTOLIC BLOOD PRESSURE: 141 MMHG | OXYGEN SATURATION: 97 % | RESPIRATION RATE: 15 BRPM | HEART RATE: 80 BPM | TEMPERATURE: 97.5 F

## 2025-09-07 LAB
ANION GAP SERPL CALC-SCNC: 12 MMOL/L (ref 10–20)
BUN SERPL-MCNC: 22 MG/DL (ref 6–23)
CALCIUM SERPL-MCNC: 9.3 MG/DL (ref 8.6–10.3)
CHLORIDE SERPL-SCNC: 101 MMOL/L (ref 98–107)
CO2 SERPL-SCNC: 28 MMOL/L (ref 21–32)
CREAT SERPL-MCNC: 0.98 MG/DL (ref 0.5–1.3)
EGFRCR SERPLBLD CKD-EPI 2021: 77 ML/MIN/1.73M*2
ERYTHROCYTE [DISTWIDTH] IN BLOOD BY AUTOMATED COUNT: 14.1 % (ref 11.5–14.5)
GLUCOSE BLD MANUAL STRIP-MCNC: 168 MG/DL (ref 74–99)
GLUCOSE BLD MANUAL STRIP-MCNC: 185 MG/DL (ref 74–99)
GLUCOSE SERPL-MCNC: 161 MG/DL (ref 74–99)
HCT VFR BLD AUTO: 33.6 % (ref 41–52)
HGB BLD-MCNC: 11 G/DL (ref 13.5–17.5)
MCH RBC QN AUTO: 33.1 PG (ref 26–34)
MCHC RBC AUTO-ENTMCNC: 32.7 G/DL (ref 32–36)
MCV RBC AUTO: 101 FL (ref 80–100)
NRBC BLD-RTO: 0 /100 WBCS (ref 0–0)
PLATELET # BLD AUTO: 137 X10*3/UL (ref 150–450)
POTASSIUM SERPL-SCNC: 4 MMOL/L (ref 3.5–5.3)
RBC # BLD AUTO: 3.32 X10*6/UL (ref 4.5–5.9)
SODIUM SERPL-SCNC: 137 MMOL/L (ref 136–145)
WBC # BLD AUTO: 11.4 X10*3/UL (ref 4.4–11.3)

## 2025-09-07 PROCEDURE — 97530 THERAPEUTIC ACTIVITIES: CPT | Mod: GP | Performed by: PHYSICAL THERAPIST

## 2025-09-07 PROCEDURE — 97530 THERAPEUTIC ACTIVITIES: CPT | Mod: GO,CO

## 2025-09-07 PROCEDURE — 2500000002 HC RX 250 W HCPCS SELF ADMINISTERED DRUGS (ALT 637 FOR MEDICARE OP, ALT 636 FOR OP/ED): Performed by: STUDENT IN AN ORGANIZED HEALTH CARE EDUCATION/TRAINING PROGRAM

## 2025-09-07 PROCEDURE — 2500000001 HC RX 250 WO HCPCS SELF ADMINISTERED DRUGS (ALT 637 FOR MEDICARE OP): Performed by: STUDENT IN AN ORGANIZED HEALTH CARE EDUCATION/TRAINING PROGRAM

## 2025-09-07 PROCEDURE — 36415 COLL VENOUS BLD VENIPUNCTURE: CPT | Performed by: STUDENT IN AN ORGANIZED HEALTH CARE EDUCATION/TRAINING PROGRAM

## 2025-09-07 PROCEDURE — 97535 SELF CARE MNGMENT TRAINING: CPT | Mod: GO,CO

## 2025-09-07 PROCEDURE — 85027 COMPLETE CBC AUTOMATED: CPT | Performed by: STUDENT IN AN ORGANIZED HEALTH CARE EDUCATION/TRAINING PROGRAM

## 2025-09-07 PROCEDURE — 82947 ASSAY GLUCOSE BLOOD QUANT: CPT

## 2025-09-07 PROCEDURE — 2500000004 HC RX 250 GENERAL PHARMACY W/ HCPCS (ALT 636 FOR OP/ED): Performed by: STUDENT IN AN ORGANIZED HEALTH CARE EDUCATION/TRAINING PROGRAM

## 2025-09-07 PROCEDURE — 97116 GAIT TRAINING THERAPY: CPT | Mod: GP | Performed by: PHYSICAL THERAPIST

## 2025-09-07 PROCEDURE — 80048 BASIC METABOLIC PNL TOTAL CA: CPT | Performed by: STUDENT IN AN ORGANIZED HEALTH CARE EDUCATION/TRAINING PROGRAM

## 2025-09-07 RX ORDER — OXYCODONE HYDROCHLORIDE 5 MG/1
5 TABLET ORAL EVERY 4 HOURS PRN
Qty: 30 TABLET | Refills: 0 | Status: SHIPPED | OUTPATIENT
Start: 2025-09-07 | End: 2025-09-12

## 2025-09-07 RX ADMIN — Medication 1 TABLET: at 08:45

## 2025-09-07 RX ADMIN — METOPROLOL TARTRATE 25 MG: 25 TABLET, FILM COATED ORAL at 08:45

## 2025-09-07 RX ADMIN — ICOSAPENT ETHYL 2 G: 1 CAPSULE ORAL at 08:45

## 2025-09-07 RX ADMIN — ACETAMINOPHEN 325MG 650 MG: 325 TABLET ORAL at 05:30

## 2025-09-07 RX ADMIN — FINASTERIDE 5 MG: 5 TABLET, FILM COATED ORAL at 08:46

## 2025-09-07 RX ADMIN — OXYCODONE HYDROCHLORIDE 5 MG: 5 TABLET ORAL at 01:29

## 2025-09-07 RX ADMIN — AMLODIPINE BESYLATE 10 MG: 10 TABLET ORAL at 08:45

## 2025-09-07 RX ADMIN — FENOFIBRATE 54 MG: 54 TABLET ORAL at 08:46

## 2025-09-07 RX ADMIN — ENOXAPARIN SODIUM 40 MG: 100 INJECTION SUBCUTANEOUS at 08:45

## 2025-09-07 RX ADMIN — ASPIRIN 81 MG CHEWABLE TABLET 81 MG: 81 TABLET CHEWABLE at 08:46

## 2025-09-07 RX ADMIN — POTASSIUM CHLORIDE EXTENDED-RELEASE 20 MEQ: 1500 TABLET ORAL at 08:46

## 2025-09-07 RX ADMIN — POLYETHYLENE GLYCOL 3350 17 G: 17 POWDER, FOR SOLUTION ORAL at 08:45

## 2025-09-07 RX ADMIN — SENNOSIDES AND DOCUSATE SODIUM 2 TABLET: 50; 8.6 TABLET ORAL at 08:45

## 2025-09-07 RX ADMIN — OXYCODONE HYDROCHLORIDE 5 MG: 5 TABLET ORAL at 10:15

## 2025-09-07 RX ADMIN — CYCLOBENZAPRINE HYDROCHLORIDE 5 MG: 5 TABLET, FILM COATED ORAL at 08:45

## 2025-09-07 RX ADMIN — OXYCODONE HYDROCHLORIDE 5 MG: 5 TABLET ORAL at 05:29

## 2025-09-07 RX ADMIN — ACETAMINOPHEN 325MG 650 MG: 325 TABLET ORAL at 11:07

## 2025-09-07 RX ADMIN — OXYCODONE HYDROCHLORIDE AND ACETAMINOPHEN 500 MG: 500 TABLET ORAL at 08:46

## 2025-09-07 RX ADMIN — FAMOTIDINE 40 MG: 20 TABLET, FILM COATED ORAL at 08:46

## 2025-09-07 RX ADMIN — OXYCODONE HYDROCHLORIDE 5 MG: 5 TABLET ORAL at 13:49

## 2025-09-07 RX ADMIN — LEVOTHYROXINE SODIUM 50 MCG: 0.05 TABLET ORAL at 08:46

## 2025-09-07 ASSESSMENT — ACTIVITIES OF DAILY LIVING (ADL): HOME_MANAGEMENT_TIME_ENTRY: 10

## 2025-09-07 ASSESSMENT — COGNITIVE AND FUNCTIONAL STATUS - GENERAL
TURNING FROM BACK TO SIDE WHILE IN FLAT BAD: A LITTLE
MOBILITY SCORE: 19
DRESSING REGULAR LOWER BODY CLOTHING: A LOT
CLIMB 3 TO 5 STEPS WITH RAILING: A LITTLE
DRESSING REGULAR UPPER BODY CLOTHING: A LITTLE
STANDING UP FROM CHAIR USING ARMS: A LITTLE
TOILETING: A LITTLE
DAILY ACTIVITIY SCORE: 18
HELP NEEDED FOR BATHING: A LITTLE
WALKING IN HOSPITAL ROOM: A LITTLE
PERSONAL GROOMING: A LITTLE
MOVING TO AND FROM BED TO CHAIR: A LITTLE

## 2025-09-07 ASSESSMENT — PAIN SCALES - GENERAL
PAINLEVEL_OUTOF10: 6
PAINLEVEL_OUTOF10: 3
PAINLEVEL_OUTOF10: 9
PAINLEVEL_OUTOF10: 6
PAINLEVEL_OUTOF10: 3
PAINLEVEL_OUTOF10: 0 - NO PAIN
PAINLEVEL_OUTOF10: 6
PAINLEVEL_OUTOF10: 5 - MODERATE PAIN
PAINLEVEL_OUTOF10: 7
PAINLEVEL_OUTOF10: 7

## 2025-09-07 ASSESSMENT — PAIN - FUNCTIONAL ASSESSMENT
PAIN_FUNCTIONAL_ASSESSMENT: 0-10

## 2025-09-07 ASSESSMENT — PAIN DESCRIPTION - ORIENTATION
ORIENTATION: RIGHT
ORIENTATION: LOWER

## 2025-09-07 ASSESSMENT — PAIN DESCRIPTION - LOCATION
LOCATION: BACK

## 2025-09-07 ASSESSMENT — PAIN DESCRIPTION - DESCRIPTORS: DESCRIPTORS: ACHING

## 2025-09-07 ASSESSMENT — PAIN SCALES - WONG BAKER: WONGBAKER_NUMERICALRESPONSE: NO HURT

## 2025-09-08 ENCOUNTER — APPOINTMENT (OUTPATIENT)
Dept: CARDIOLOGY | Facility: CLINIC | Age: 85
End: 2025-09-08
Payer: MEDICARE

## 2025-09-22 ENCOUNTER — APPOINTMENT (OUTPATIENT)
Dept: NEUROSURGERY | Facility: CLINIC | Age: 85
End: 2025-09-22
Payer: MEDICARE

## 2025-11-19 ENCOUNTER — APPOINTMENT (OUTPATIENT)
Dept: PRIMARY CARE | Facility: CLINIC | Age: 85
End: 2025-11-19
Payer: MEDICARE

## (undated) DEVICE — DISSECTING TOOL, 31CM, MAZOR X STEALTH, MIDAS REX

## (undated) DEVICE — KIT, MAZOR X SPINE, DISPOSABLE

## (undated) DEVICE — TRAY, SURESTEP, SILICONE DRAINAGE BAG, STATLOCK, 16FR

## (undated) DEVICE — TUBING, MIDAS MR8 IRRIGATION TUBING, CLEARVIEW

## (undated) DEVICE — GLOVE, SURGICAL, PROTEXIS PI MICRO, 8.5, PF, LF

## (undated) DEVICE — CAUTERY, PENCIL, PUSH BUTTON, SMOKE EVAC, 70MM

## (undated) DEVICE — APPLICATOR, CHLORAPREP, W/ORANGE TINT, 26ML

## (undated) DEVICE — CLOSURE SYSTEM, DERMABOND, PRINEO, 22CM, STERILE

## (undated) DEVICE — DRAPE COVER, C ARM, FLOUROSCAN IMAGING SYS

## (undated) DEVICE — SUTURE, VICRYL, 0, 18 IN, CT-1, UNDYED

## (undated) DEVICE — Device

## (undated) DEVICE — TOOL, MR8 14CM MATCH 3MM

## (undated) DEVICE — DRAPE, MICROSCOPE, W/REMOVABLE LENS

## (undated) DEVICE — SUTURE, STRATAFIX, SPIRAL MONOCRYL PLUS, 3-0, PS-2 45CM, UNDYED

## (undated) DEVICE — RESERVOIR, WOUND, W/TROCAR, PVC, MEDIUM, 400CC, DAVOL, 1/8 IN, 10FR

## (undated) DEVICE — KIT, JACKSON TABLE, PARTIAL

## (undated) DEVICE — COVER, BACK TABLE, STERILE-Z

## (undated) DEVICE — SEALER, BIPOLAR, AQUA MANTYS 6.0

## (undated) DEVICE — WOUND SYSTEM, DEBRIDEMENT & CLEANING, O.R DUOPAK

## (undated) DEVICE — BUR, 3MM X 3.8MM, PRECISION, NEURO DRILL

## (undated) DEVICE — ELECTRODE, ELECTROSURGICAL, BLADE EXT 4 INCH, INSULATED

## (undated) DEVICE — BONE VAC, AUTOLOGOUS DUST COLLECTOR

## (undated) DEVICE — SUTURE, VICRYL PLUS, 0, 8X18IN, CT-1, UND, BRAIDED

## (undated) DEVICE — SEALANT, HEMOSTATIC, FLOSEAL, 10 ML

## (undated) DEVICE — DRAPE, INCISE, ANTIMICROBIAL, IOBAN 2, LARGE, 17 X 23 IN, DISPOSABLE, STERILE

## (undated) DEVICE — PATIENT SPECIFIC UNID TECHNOLOGY

## (undated) DEVICE — SUTURE, SILK, 2-0, 18 IN, FSL, BLACK

## (undated) DEVICE — SEALANT, DURASEAL, 5ML

## (undated) DEVICE — EXTENDER, SV TAB, 5.5/6.0

## (undated) DEVICE — SUTURE, MONOCRYL PLUS, 4-0, PS-2 UD 27IN

## (undated) DEVICE — BUR, MR8 14CM 3MM DIA, MATCH HEAD, LOW PROFILE SP SYM-TRI

## (undated) DEVICE — SUTURE, PROLENE, 6-0, 24 IN, BV1, DA, BLUE

## (undated) DEVICE — SPHERE, STEALTHSTATION, 5-PK

## (undated) DEVICE — BUR, MIS 3MM PRECISION NEURO

## (undated) DEVICE — SUTURE, POLYSORB 2/0  36  UNDYED  GS-21  209P"

## (undated) DEVICE — TOWEL PACK, STERILE, 4/PACK, BLUE

## (undated) DEVICE — HEMOSTAT, SURGICEL POWDER 3.0GRAMS